# Patient Record
Sex: FEMALE | Race: BLACK OR AFRICAN AMERICAN | Employment: OTHER | URBAN - METROPOLITAN AREA
[De-identification: names, ages, dates, MRNs, and addresses within clinical notes are randomized per-mention and may not be internally consistent; named-entity substitution may affect disease eponyms.]

---

## 2017-01-25 ENCOUNTER — HOSPITAL ENCOUNTER (OUTPATIENT)
Dept: PHYSICAL THERAPY | Age: 57
Discharge: HOME | End: 2017-01-31
Attending: FAMILY MEDICINE | Admitting: FAMILY MEDICINE

## 2017-01-25 ASSESSMENT — PAIN DESCRIPTION - PAIN TYPE: TYPE: CHRONIC PAIN

## 2017-01-25 ASSESSMENT — PAIN DESCRIPTION - FREQUENCY: FREQUENCY: CONTINUOUS

## 2017-01-25 ASSESSMENT — PAIN SCALES - GENERAL: PAINLEVEL_OUTOF10: 8

## 2017-01-25 ASSESSMENT — PAIN DESCRIPTION - ONSET: ONSET: GRADUAL

## 2017-01-25 ASSESSMENT — PAIN DESCRIPTION - PROGRESSION: CLINICAL_PROGRESSION: GRADUALLY WORSENING

## 2017-01-25 ASSESSMENT — PAIN DESCRIPTION - DESCRIPTORS: DESCRIPTORS: ACHING;CONSTANT;SHARP

## 2017-02-01 ENCOUNTER — HOSPITAL ENCOUNTER (OUTPATIENT)
Dept: OTHER | Age: 57
Discharge: HOME | End: 2017-02-28
Attending: FAMILY MEDICINE | Admitting: FAMILY MEDICINE

## 2017-05-24 ENCOUNTER — HOSPITAL ENCOUNTER (OUTPATIENT)
Dept: GENERAL RADIOLOGY | Age: 57
Discharge: HOME | End: 2017-05-24
Attending: INTERNAL MEDICINE | Admitting: INTERNAL MEDICINE

## 2017-05-24 LAB
ALBUMIN SERPL-MCNC: 4.2 GM/DL (ref 3.4–5)
ALP BLD-CCNC: 76 IU/L (ref 40–128)
ALT SERPL-CCNC: 55 U/L (ref 10–40)
ANION GAP SERPL CALCULATED.3IONS-SCNC: 13 MMOL/L (ref 4–16)
AST SERPL-CCNC: 51 IU/L (ref 15–37)
BILIRUB SERPL-MCNC: 0.2 MG/DL (ref 0–1)
BUN BLDV-MCNC: 9 MG/DL (ref 6–23)
CALCIUM SERPL-MCNC: 9.5 MG/DL (ref 8.3–10.6)
CHLORIDE BLD-SCNC: 102 MMOL/L (ref 99–110)
CHOLESTEROL: 128 MG/DL
CO2: 29 MMOL/L (ref 21–32)
CREAT SERPL-MCNC: 0.7 MG/DL (ref 0.6–1.1)
CREATININE URINE: 208.9 MG/DL (ref 28–217)
ESTIMATED AVERAGE GLUCOSE: 171 MG/DL
GFR AFRICAN AMERICAN: >60 ML/MIN/1.73M2
GFR NON-AFRICAN AMERICAN: >60 ML/MIN/1.73M2
GLUCOSE BLD-MCNC: 156 MG/DL (ref 70–140)
HBA1C MFR BLD: 7.6 % (ref 4.2–6.3)
HDLC SERPL-MCNC: 41 MG/DL
LDL CHOLESTEROL DIRECT: 70 MG/DL
MICROALBUMIN/CREAT 24H UR: 21.5 MG/DL
MICROALBUMIN/CREAT UR-RTO: 102.9 MG/G CREAT (ref 0–30)
POTASSIUM SERPL-SCNC: 4.1 MMOL/L (ref 3.5–5.1)
SODIUM BLD-SCNC: 144 MMOL/L (ref 135–145)
TOTAL PROTEIN: 7.3 GM/DL (ref 6.4–8.2)
TRIGL SERPL-MCNC: 96 MG/DL

## 2017-07-03 ENCOUNTER — HOSPITAL ENCOUNTER (OUTPATIENT)
Dept: CT IMAGING | Age: 57
Discharge: HOME | End: 2017-07-03
Attending: INTERNAL MEDICINE | Admitting: INTERNAL MEDICINE

## 2017-07-03 DIAGNOSIS — L04.9 LYMPHADENITIS, ACUTE: ICD-10-CM

## 2017-07-03 DIAGNOSIS — L04.9 ACUTE LYMPHADENITIS: ICD-10-CM

## 2017-09-14 ENCOUNTER — HOSPITAL ENCOUNTER (OUTPATIENT)
Dept: GENERAL RADIOLOGY | Age: 57
Discharge: HOME | End: 2017-09-14
Attending: INTERNAL MEDICINE | Admitting: INTERNAL MEDICINE

## 2017-09-14 LAB
ALBUMIN SERPL-MCNC: 3.9 GM/DL (ref 3.4–5)
ALP BLD-CCNC: 75 IU/L (ref 40–128)
ALT SERPL-CCNC: 64 U/L (ref 10–40)
ANION GAP SERPL CALCULATED.3IONS-SCNC: 15 MMOL/L (ref 4–16)
AST SERPL-CCNC: 44 IU/L (ref 15–37)
BILIRUB SERPL-MCNC: 0.3 MG/DL (ref 0–1)
BUN BLDV-MCNC: 10 MG/DL (ref 6–23)
CALCIUM SERPL-MCNC: 9.5 MG/DL (ref 8.3–10.6)
CHLORIDE BLD-SCNC: 101 MMOL/L (ref 99–110)
CHOLESTEROL: 192 MG/DL
CO2: 28 MMOL/L (ref 21–32)
CREAT SERPL-MCNC: 0.7 MG/DL (ref 0.6–1.1)
ESTIMATED AVERAGE GLUCOSE: 169 MG/DL
GFR AFRICAN AMERICAN: >60 ML/MIN/1.73M2
GFR NON-AFRICAN AMERICAN: >60 ML/MIN/1.73M2
GLUCOSE FASTING: 153 MG/DL (ref 70–99)
HBA1C MFR BLD: 7.5 % (ref 4.2–6.3)
HDLC SERPL-MCNC: 47 MG/DL
LDL CHOLESTEROL DIRECT: 127 MG/DL
POTASSIUM SERPL-SCNC: 4.3 MMOL/L (ref 3.5–5.1)
SODIUM BLD-SCNC: 144 MMOL/L (ref 135–145)
TOTAL PROTEIN: 7.1 GM/DL (ref 6.4–8.2)
TRIGL SERPL-MCNC: 120 MG/DL

## 2017-10-30 ENCOUNTER — TELEPHONE (OUTPATIENT)
Dept: CARDIOLOGY CLINIC | Age: 57
End: 2017-10-30

## 2017-10-30 NOTE — TELEPHONE ENCOUNTER
Called pt to schedule consult per Dr Johana Lesches for leg edema, left message to call office to schedule with Dr Leona Ordoñez this week if poss

## 2017-12-13 ENCOUNTER — HOSPITAL ENCOUNTER (OUTPATIENT)
Dept: GENERAL RADIOLOGY | Age: 57
Discharge: HOME | End: 2017-12-13
Attending: INTERNAL MEDICINE | Admitting: INTERNAL MEDICINE

## 2017-12-13 LAB
ALBUMIN SERPL-MCNC: 4 GM/DL (ref 3.4–5)
ALP BLD-CCNC: 75 IU/L (ref 40–128)
ALT SERPL-CCNC: 82 U/L (ref 10–40)
ANION GAP SERPL CALCULATED.3IONS-SCNC: 13 MMOL/L (ref 4–16)
AST SERPL-CCNC: 101 IU/L (ref 15–37)
BILIRUB SERPL-MCNC: 0.2 MG/DL (ref 0–1)
BUN BLDV-MCNC: 7 MG/DL (ref 6–23)
CALCIUM SERPL-MCNC: 9.9 MG/DL (ref 8.3–10.6)
CHLORIDE BLD-SCNC: 101 MMOL/L (ref 99–110)
CHOLESTEROL: 129 MG/DL
CO2: 30 MMOL/L (ref 21–32)
CREAT SERPL-MCNC: 0.7 MG/DL (ref 0.6–1.1)
ESTIMATED AVERAGE GLUCOSE: 177 MG/DL
GFR AFRICAN AMERICAN: >60 ML/MIN/1.73M2
GFR NON-AFRICAN AMERICAN: >60 ML/MIN/1.73M2
GLUCOSE FASTING: 169 MG/DL (ref 70–99)
HBA1C MFR BLD: 7.8 % (ref 4.2–6.3)
HDLC SERPL-MCNC: 42 MG/DL
LDL CHOLESTEROL DIRECT: 73 MG/DL
POTASSIUM SERPL-SCNC: 4.6 MMOL/L (ref 3.5–5.1)
SODIUM BLD-SCNC: 144 MMOL/L (ref 135–145)
TOTAL PROTEIN: 7.3 GM/DL (ref 6.4–8.2)
TRIGL SERPL-MCNC: 120 MG/DL

## 2018-02-12 ENCOUNTER — INITIAL CONSULT (OUTPATIENT)
Dept: CARDIOLOGY CLINIC | Age: 58
End: 2018-02-12

## 2018-02-12 VITALS
DIASTOLIC BLOOD PRESSURE: 92 MMHG | WEIGHT: 293 LBS | HEIGHT: 68 IN | BODY MASS INDEX: 44.41 KG/M2 | SYSTOLIC BLOOD PRESSURE: 144 MMHG | HEART RATE: 81 BPM

## 2018-02-12 DIAGNOSIS — I10 ESSENTIAL HYPERTENSION: ICD-10-CM

## 2018-02-12 DIAGNOSIS — E11.9 TYPE 2 DIABETES MELLITUS WITHOUT COMPLICATION, WITHOUT LONG-TERM CURRENT USE OF INSULIN (HCC): ICD-10-CM

## 2018-02-12 DIAGNOSIS — R07.9 CHEST PAIN, UNSPECIFIED TYPE: Primary | ICD-10-CM

## 2018-02-12 DIAGNOSIS — R07.2 PRECORDIAL PAIN: ICD-10-CM

## 2018-02-12 PROCEDURE — 93000 ELECTROCARDIOGRAM COMPLETE: CPT | Performed by: INTERNAL MEDICINE

## 2018-02-12 PROCEDURE — 99204 OFFICE O/P NEW MOD 45 MIN: CPT | Performed by: INTERNAL MEDICINE

## 2018-02-12 RX ORDER — PANTOPRAZOLE SODIUM 40 MG/1
40 TABLET, DELAYED RELEASE ORAL DAILY
COMMUNITY

## 2018-02-12 RX ORDER — LISINOPRIL 10 MG/1
10 TABLET ORAL DAILY
Qty: 30 TABLET | Refills: 3 | Status: SHIPPED | OUTPATIENT
Start: 2018-02-12 | End: 2018-03-14 | Stop reason: ALTCHOICE

## 2018-02-12 RX ORDER — FUROSEMIDE 20 MG/1
TABLET ORAL
Refills: 0 | COMMUNITY
Start: 2018-01-16

## 2018-02-12 NOTE — PROGRESS NOTES
CARDIOLOGY CONSULT NOTE    Chief Complaint: Chest Pain/ shortness of breath     HPI:   Gab aZmbrano is a 62y.o. year old who has history as noted below. She has long-standing history of jaw pain. She believes due to arthritis . 6 mo mo nths, back she started noticing chest pressure and pain in the center of her chest.  She notices ankle swelling if she is not taking Lasix. She denies any change in her breathing pattern but she was diaphoretic. Few nights ago. She is diabetic and she has had high blood pressure since age of 25      Current Outpatient Prescriptions   Medication Sig Dispense Refill    pantoprazole (PROTONIX) 40 MG tablet Take 40 mg by mouth daily      furosemide (LASIX) 20 MG tablet take 1/2 tablet by mouth once daily  0    LYRICA 75 MG capsule take 1 capsule by mouth twice a day  0    lisinopril (PRINIVIL;ZESTRIL) 10 MG tablet Take 1 tablet by mouth daily 30 tablet 3    ibuprofen (ADVIL;MOTRIN) 600 MG tablet Take 1 tablet by mouth every 8 hours as needed for Pain 30 tablet 0    indomethacin (INDOCIN) 50 MG capsule Take 1 capsule by mouth 3 times daily as needed (pain) 30 capsule 0    traMADol (ULTRAM) 50 MG tablet Take 1 tablet by mouth every 6 hours as needed for Pain 12 tablet 0    allopurinol (ZYLOPRIM) 100 MG tablet Take 1 tablet by mouth daily. 15 tablet 0    Misc. Devices (CANE) MISC 1 Device by Does not apply route daily as needed. 1 each 0    furosemide (LASIX) 40 MG tablet Take 40 mg by mouth 2 times daily.  DULoxetine (CYMBALTA) 60 MG capsule Take 60 mg by mouth daily.  theophylline CR, 24 hour, (UNIPHYL) 400 MG SR tablet Take 300 mg by mouth daily.  metformin (GLUCOPHAGE) 500 MG tablet Take 500 mg by mouth 2 times daily (with meals).  potassium chloride SA (K-DUR;KLOR-CON M) 10 MEQ tablet Take 10 mEq by mouth daily.  simvastatin (ZOCOR) 20 MG tablet Take 20 mg by mouth nightly.       amLODIPine (NORVASC) 10 MG tablet HDL 42 12/13/2017    LDLCALC 68 12/09/2016    LDLDIRECT 73 12/13/2017     Lab Results   Component Value Date    ALT 82 (H) 12/13/2017     (H) 12/13/2017     TSH: No results found for: TSH      All labs, medications and tests reviewed by myself including data and history from outside source , patient and available family . Assessment & Plan:      1. Chest pain, unspecified type    2. Essential hypertension    3. Precordial pain    4. Type 2 diabetes mellitus without complication, without long-term current use of insulin (HCC)         Precordial pain  Chest pressure / pain occurs with stress , she feels it is ongoing for last 6 months, The pain does not  radiate , she does have  shorntess of breath which gets better after she takes water pill. We will stress test and echo. She gets ankle swelling. I wonder if she has diastolic dysfunction. We will get an echo  Essential hypertension  \"bp always runs high\" add lisinopril 10 mg      Dyslipidemia :  Carol Scott had lab work recently,  Lipid profile was reviewed with patient. Ideally she should be on statins due to diabetes    Counseled extensively and medication compliance urged. We discussed that for the  prevention of ASCVD our  goal is aggressive risk modification. Patient is encouraged to exercise even a brisk walk for 30 minutes  at least 3 to 4 times a week   Various goals were discussed and questions answered. Continue current medications. Appropriate prescriptions are addressed and refills ordered. Questions answered and patient verbalizes understanding. Call for any problems, questions, or concerns. Continue all other medications of all above medical condition listed as is. Return in about 1 month (around 3/12/2018).     Please note this report has been partially produced using speech recognition software and may contain errors related to that system including errors in grammar, punctuation, and spelling, as well as words and phrases that may be

## 2018-02-14 ENCOUNTER — TELEPHONE (OUTPATIENT)
Dept: CARDIOLOGY CLINIC | Age: 58
End: 2018-02-14

## 2018-02-14 NOTE — TELEPHONE ENCOUNTER
Pt called stating she was seen for consult on Monday and thought she was going to have 2 medications prescribed for her. Pt states she only had one for Lisinopril at the pharmacy. Pt is wanting to know if she was supposed to have a second one ordered to protect her kidneys since she is diabetic. Advised pt that I would speak Dr. Jerri Santo and call her to advise afterward. Pt voiced understanding.

## 2018-02-22 ENCOUNTER — PROCEDURE VISIT (OUTPATIENT)
Dept: CARDIOLOGY CLINIC | Age: 58
End: 2018-02-22

## 2018-02-22 DIAGNOSIS — R07.9 CHEST PAIN, UNSPECIFIED TYPE: Primary | ICD-10-CM

## 2018-02-22 DIAGNOSIS — R07.2 PRECORDIAL PAIN: ICD-10-CM

## 2018-02-22 DIAGNOSIS — I10 ESSENTIAL HYPERTENSION: ICD-10-CM

## 2018-02-22 DIAGNOSIS — E11.9 TYPE 2 DIABETES MELLITUS WITHOUT COMPLICATION, WITHOUT LONG-TERM CURRENT USE OF INSULIN (HCC): ICD-10-CM

## 2018-02-22 DIAGNOSIS — R07.9 CHEST PAIN, UNSPECIFIED TYPE: ICD-10-CM

## 2018-02-22 LAB
LV EF: 45 %
LV EF: 55 %
LVEF MODALITY: NORMAL
LVEF MODALITY: NORMAL

## 2018-02-22 PROCEDURE — 93016 CV STRESS TEST SUPVJ ONLY: CPT | Performed by: INTERNAL MEDICINE

## 2018-02-22 PROCEDURE — 78452 HT MUSCLE IMAGE SPECT MULT: CPT | Performed by: INTERNAL MEDICINE

## 2018-02-22 PROCEDURE — 93306 TTE W/DOPPLER COMPLETE: CPT | Performed by: INTERNAL MEDICINE

## 2018-02-22 PROCEDURE — 93018 CV STRESS TEST I&R ONLY: CPT | Performed by: INTERNAL MEDICINE

## 2018-02-22 PROCEDURE — 93017 CV STRESS TEST TRACING ONLY: CPT | Performed by: INTERNAL MEDICINE

## 2018-02-22 PROCEDURE — A9500 TC99M SESTAMIBI: HCPCS | Performed by: INTERNAL MEDICINE

## 2018-02-23 ENCOUNTER — TELEPHONE (OUTPATIENT)
Dept: CARDIOLOGY CLINIC | Age: 58
End: 2018-02-23

## 2018-03-14 ENCOUNTER — OFFICE VISIT (OUTPATIENT)
Dept: CARDIOLOGY CLINIC | Age: 58
End: 2018-03-14

## 2018-03-14 VITALS
WEIGHT: 293 LBS | SYSTOLIC BLOOD PRESSURE: 144 MMHG | HEART RATE: 78 BPM | DIASTOLIC BLOOD PRESSURE: 88 MMHG | BODY MASS INDEX: 46.83 KG/M2

## 2018-03-14 DIAGNOSIS — E11.9 TYPE 2 DIABETES MELLITUS WITHOUT COMPLICATION, WITHOUT LONG-TERM CURRENT USE OF INSULIN (HCC): ICD-10-CM

## 2018-03-14 DIAGNOSIS — R07.2 PRECORDIAL PAIN: Primary | ICD-10-CM

## 2018-03-14 DIAGNOSIS — I10 ESSENTIAL HYPERTENSION: ICD-10-CM

## 2018-03-14 DIAGNOSIS — I73.9 CLAUDICATION (HCC): ICD-10-CM

## 2018-03-14 PROCEDURE — 99214 OFFICE O/P EST MOD 30 MIN: CPT | Performed by: INTERNAL MEDICINE

## 2018-03-14 RX ORDER — BIMATOPROST 0.01 %
1 DROPS OPHTHALMIC (EYE) DAILY
Refills: 1 | COMMUNITY
Start: 2018-01-22

## 2018-03-14 RX ORDER — AMLODIPINE BESYLATE AND BENAZEPRIL HYDROCHLORIDE 10; 20 MG/1; MG/1
1 CAPSULE ORAL DAILY
Refills: 0 | COMMUNITY
Start: 2018-02-15 | End: 2018-03-14 | Stop reason: ALTCHOICE

## 2018-03-14 RX ORDER — AMLODIPINE BESYLATE AND BENAZEPRIL HYDROCHLORIDE 10; 40 MG/1; MG/1
1 CAPSULE ORAL DAILY
Qty: 30 CAPSULE | Refills: 3 | Status: SHIPPED | OUTPATIENT
Start: 2018-03-14 | End: 2018-03-19 | Stop reason: ALTCHOICE

## 2018-03-14 RX ORDER — PRAVASTATIN SODIUM 20 MG
20 TABLET ORAL DAILY
Qty: 30 TABLET | Refills: 3 | Status: SHIPPED | OUTPATIENT
Start: 2018-03-14 | End: 2018-07-02 | Stop reason: SDUPTHER

## 2018-03-14 RX ORDER — THEOPHYLLINE 300 MG/1
1 TABLET, EXTENDED RELEASE ORAL DAILY
Refills: 0 | COMMUNITY
Start: 2018-02-11 | End: 2022-03-07 | Stop reason: ALTCHOICE

## 2018-03-14 RX ORDER — HYDROCODONE BITARTRATE AND ACETAMINOPHEN 5; 325 MG/1; MG/1
1 TABLET ORAL 3 TIMES DAILY PRN
Refills: 0 | COMMUNITY
Start: 2018-02-15 | End: 2021-09-22

## 2018-03-14 NOTE — PROGRESS NOTES
concerned rich she is taking amlodipine alone and the in combination pill as well     Claudication [facility])  Check arterial doppler , it may be neuropathy     Dyslipidemia :  Etta Saucedo had lab work recently,  Lipid profile was reviewed with patient. Ideally she should be on statins due to diabetes. Will start small dose pravastatin     Counseled extensively and medication compliance urged. We discussed that for the  prevention of ASCVD our  goal is aggressive risk modification. Patient is encouraged to exercise even a brisk walk for 30 minutes  at least 3 to 4 times a week   Various goals were discussed and questions answered. Continue current medications. Appropriate prescriptions are addressed and refills ordered. Questions answered and patient verbalizes understanding. Call for any problems, questions, or concerns. Continue all other medications of all above medical condition listed as is. No Follow-up on file. Please note this report has been partially produced using speech recognition software and may contain errors related to that system including errors in grammar, punctuation, and spelling, as well as words and phrases that may be inappropriate.  If there are any questions or concerns please feel free to contact the dictating provider for clarification.

## 2018-03-19 RX ORDER — DICYCLOMINE HCL 20 MG
20 TABLET ORAL EVERY 6 HOURS
COMMUNITY

## 2018-03-19 RX ORDER — AMLODIPINE BESYLATE AND ATORVASTATIN CALCIUM 10; 40 MG/1; MG/1
1 TABLET, FILM COATED ORAL DAILY
COMMUNITY
End: 2020-04-14 | Stop reason: ALTCHOICE

## 2018-03-26 ENCOUNTER — PROCEDURE VISIT (OUTPATIENT)
Dept: CARDIOLOGY CLINIC | Age: 58
End: 2018-03-26

## 2018-03-26 DIAGNOSIS — E11.9 TYPE 2 DIABETES MELLITUS WITHOUT COMPLICATION, WITHOUT LONG-TERM CURRENT USE OF INSULIN (HCC): ICD-10-CM

## 2018-03-26 DIAGNOSIS — I10 ESSENTIAL HYPERTENSION: ICD-10-CM

## 2018-03-26 DIAGNOSIS — R07.2 PRECORDIAL PAIN: ICD-10-CM

## 2018-03-26 DIAGNOSIS — I73.9 CLAUDICATION (HCC): Primary | ICD-10-CM

## 2018-03-26 PROCEDURE — 93922 UPR/L XTREMITY ART 2 LEVELS: CPT | Performed by: INTERNAL MEDICINE

## 2018-03-26 PROCEDURE — 93925 LOWER EXTREMITY STUDY: CPT | Performed by: INTERNAL MEDICINE

## 2018-04-02 ENCOUNTER — TELEPHONE (OUTPATIENT)
Dept: CARDIOLOGY CLINIC | Age: 58
End: 2018-04-02

## 2018-07-02 RX ORDER — AMLODIPINE BESYLATE AND BENAZEPRIL HYDROCHLORIDE 10; 40 MG/1; MG/1
CAPSULE ORAL
Qty: 30 CAPSULE | Refills: 5 | Status: SHIPPED | OUTPATIENT
Start: 2018-07-02 | End: 2019-01-22 | Stop reason: SDUPTHER

## 2018-07-02 RX ORDER — PRAVASTATIN SODIUM 20 MG
TABLET ORAL
Qty: 30 TABLET | Refills: 5 | Status: SHIPPED | OUTPATIENT
Start: 2018-07-02 | End: 2019-02-14 | Stop reason: SDUPTHER

## 2018-07-02 NOTE — TELEPHONE ENCOUNTER
Left a message for the patient to call the office back to reschedule her 6 mon follow up in September with Nai.

## 2018-07-17 ENCOUNTER — HOSPITAL ENCOUNTER (OUTPATIENT)
Dept: WOUND CARE | Age: 58
Discharge: HOME | End: 2018-07-17
Attending: NURSE PRACTITIONER | Admitting: NURSE PRACTITIONER

## 2018-07-17 VITALS
SYSTOLIC BLOOD PRESSURE: 144 MMHG | RESPIRATION RATE: 16 BRPM | DIASTOLIC BLOOD PRESSURE: 106 MMHG | HEART RATE: 72 BPM | WEIGHT: 293 LBS | HEIGHT: 68 IN | BODY MASS INDEX: 44.41 KG/M2 | TEMPERATURE: 97 F

## 2018-07-17 DIAGNOSIS — E11.9 TYPE 2 DIABETES MELLITUS WITHOUT COMPLICATION, WITHOUT LONG-TERM CURRENT USE OF INSULIN (HCC): Primary | ICD-10-CM

## 2018-07-17 DIAGNOSIS — S81.802D WOUND OF LEFT LEG, SUBSEQUENT ENCOUNTER: ICD-10-CM

## 2018-07-17 PROCEDURE — 11042 DBRDMT SUBQ TIS 1ST 20SQCM/<: CPT | Performed by: NURSE PRACTITIONER

## 2018-07-17 PROCEDURE — 99213 OFFICE O/P EST LOW 20 MIN: CPT | Performed by: NURSE PRACTITIONER

## 2018-07-17 RX ORDER — LIDOCAINE HYDROCHLORIDE 40 MG/ML
SOLUTION TOPICAL ONCE
Status: DISCONTINUED | OUTPATIENT
Start: 2018-07-17 | End: 2018-07-18 | Stop reason: HOSPADM

## 2018-07-17 NOTE — PROGRESS NOTES
[facility] Initial Visit      Peter Irizarry  AGE: 62 y.o. GENDER: female  : 1960  EPISODE DATE:  2018     Subjective:     CHIEF COMPLAINT wound to left leg     HISTORY of PRESENT ILLNESS      Peter Irizarry is a 62 y.o. female who presents to the [address] [place] [address] for an initial visit for evaluation and treatment of Acute traumatic  wound(s) of  Left lower leg. The condition is of mild severity. The wound has been present for 1 weeks. The underlying cause is thought to be trauma after she dropped a pack and play on her leg. The patients care to date has included antibiotic and band-aid. The patient has significant underlying medical conditions as below. Wound Pain Timing/Severity: intermittent  Quality of pain: aching  Severity of pain:  2 / 10   Modifying Factors: diabetes  Associated Signs/Symptoms: none        PAST MEDICAL HISTORY        Diagnosis Date    Arthritis     Asthma     Bipolar 1 disorder (HCC)     COPD (chronic obstructive pulmonary disease) ([facility])     Depression     Diabetes mellitus ([facility])     Edema     Glaucoma     History of Doppler ultrasound 2018    Arterial-bilateral VERONICA's show normal arterial flow. No signif occlusive arterial disease.     History of nuclear stress test 2018    cardiolite-EF45%,normal    Hx of Doppler echocardiogram 2018    EF50-60%,no valvular disease    Hyperlipidemia     Hypertension     IBS (irritable bowel syndrome)        PAST SURGICAL HISTORY    Past Surgical History:   Procedure Laterality Date    TUBAL LIGATION         FAMILY HISTORY    Family History   Problem Relation Age of Onset   [facility] Cancer Mother     Diabetes Mother     Heart Disease Mother     Cancer Father     High Blood Pressure Father     Cancer Other     Cancer Other     Diabetes Sister     Heart Disease Sister     High Blood Pressure Brother     Depression Sister        SOCIAL HISTORY    Social History   Substance Use Topics    7/17/2018 10:05 AM   Andreea-wound Assessment Red 7/17/2018 10:05 AM   Non-staged Wound Description Full thickness 7/17/2018 10:05 AM   [place]%Wound Bed 0 7/17/2018 10:05 AM   Red%Wound Bed 100 7/17/2018 10:05 AM   Yellow%Wound Bed 0 7/17/2018 10:05 AM   Black%Wound Bed 0 7/17/2018 10:05 AM   Purple%Wound Bed 0 7/17/2018 10:05 AM   Other%Wound Bed 0 7/17/2018 10:05 AM   Culture Taken Yes 7/17/2018 10:05 AM   Number of days: 0       Percent of Wound(s) Debrided: 100%    Total  Area  Debrided:  0.09 sq cm     Bleeding:  Minimal    Hemostasis Achieved:  by pressure    Procedural Pain:  0  / 10     Post Procedural Pain:  0 / 10     Response to treatment:  Well tolerated by patient. Plan:     Discharge instructions:   PHYSICIAN ORDERS AND DISCHARGE INSTRUCTIONS    NOTE: Upon discharge from the [address], you will receive a patient experience survey. We would be grateful if you would take the time to fill this survey out.     Wound care order history:     VERONICA's:    Vascular studies:  ARTERIAL STUDIES DONE ON 3/28/18        Imaging:                                                                              Date    Cultures: 7/17/18 OF LEFT LOWER ANTERIOR LEG    Labs/ HbA1c:                                                                      Date    Grafts:                                                                                 Date    HBO:     Antibiotics:               Earlier Wound care treatments:                Authorizations:      Consults:                                                                                Date     Primary care physician:     Continuing wound care orders and information:              Residence:                Continue home health care with:    Your wound-care supplies will be provided by:    DME provider:   Compression with   Off loading:                                                                                         Date    Wound Medications:    Wound cleansing:

## 2018-07-17 NOTE — PLAN OF CARE
Problem: Wound:  Intervention: Assess pain status  DENIES  Intervention: Assess wound size, appearance and drainage  SEE FLOWSHEET  Intervention: Assess pedal pulses bilaterally if patient has a foot or leg ulcer  SEE FLOWSHEET    Goal: Will show signs of wound healing; wound closure and no evidence of infection  Will show signs of wound healing; wound closure and no evidence of infection  Outcome: Ongoing

## 2018-07-21 LAB
CULTURE: NORMAL
CULTURE: NORMAL
REPORT STATUS: NORMAL
REQUEST PROBLEM: NORMAL
SPECIMEN: NORMAL

## 2018-07-26 ENCOUNTER — HOSPITAL ENCOUNTER (OUTPATIENT)
Dept: WOUND CARE | Age: 58
Discharge: HOME | End: 2018-07-26
Attending: NURSE PRACTITIONER | Admitting: NURSE PRACTITIONER

## 2018-07-26 VITALS
DIASTOLIC BLOOD PRESSURE: 82 MMHG | SYSTOLIC BLOOD PRESSURE: 144 MMHG | HEART RATE: 68 BPM | RESPIRATION RATE: 16 BRPM | TEMPERATURE: 97.5 F

## 2018-07-26 DIAGNOSIS — S81.802D WOUND OF LEFT LEG, SUBSEQUENT ENCOUNTER: Primary | ICD-10-CM

## 2018-07-26 PROCEDURE — 99212 OFFICE O/P EST SF 10 MIN: CPT | Performed by: NURSE PRACTITIONER

## 2018-07-26 NOTE — PROGRESS NOTES
Wound Care Center Progress Note       Maximino Iraheta  AGE: 62 y.o. GENDER: female  : 1960  TODAY'S DATE:  2018        Subjective:     Chief Complaint   Patient presents with    Wound Check     left leg         HISTORY of PRESENT ILLNESS     Maximino Iraheta is a 62 y.o. female who presents today for wound evaluation of Acute traumatic wound(s) of left lower leg. The wound is of mild severity. The underlying cause of the wound is trauma. Wounds are healed today. Wound Pain Timing/Severity: none  Quality of pain: N/A  Severity of pain:  0 / 10   Modifying Factors: diabetes  Associated Signs/Symptoms: none        PAST MEDICAL HISTORY        Diagnosis Date    Arthritis     Asthma     [facility]     COPD [facility]ronic obstructive [facility] disease) ([facility]     Depression     Diabetes mellitus (HCC)     Edema     Glaucoma     History of Doppler ultrasound 2018    Arterial-bilateral VERONICA's show normal arterial flow. No signif occlusive arterial disease.     History of nuclear stress test 2018    cardiolite-EF45%,normal    Hx of Doppler echocardiogram 2018    EF50-60%,no valvular disease    Hyperlipidemia     Hypertension     IBS (irritable bowel syndrome)        PAST SURGICAL HISTORY    Past Surgical History:   Procedure Laterality Date    TUBAL LIGATION         FAMILY HISTORY    Family History   Problem Relation Age of Onset    Cancer Mother     Diabetes Mother     Heart Disease Mother     Cancer Father     High Blood Pressure Father     Cancer Other     Cancer Other     Diabetes Sister     Heart Disease Sister     High Blood Pressure Brother     Depression Sister        SOCIAL HISTORY    Social History   Substance Use Topics    Smoking status: Former Smoker    Smokeless tobacco: Never Used    Alcohol use No       ALLERGIES    Allergies   Allergen Reactions    Codeine     Adhesive Tape Itching    Toradol [Ketorolac Tromethamine] Other (See Comments)     Headache for 5-6 days       MEDICATIONS    Current Outpatient Prescriptions on File Prior to Encounter   Medication Sig Dispense Refill    pravastatin (PRAVACHOL) 20 MG tablet take 1 tablet by mouth once daily 30 tablet 5    amLODIPine-benazepril (LOTREL) 10-40 MG per capsule take 1 capsule by mouth once daily 30 capsule 5    amLODIPine-atorvastatatin (CADUET) 10-40 MG per tablet Take 1 tablet by mouth daily      dicyclomine (BENTYL) 20 MG tablet Take 20 mg by mouth every 6 hours      VENTOLIN  (90 Base) MCG/ACT inhaler   0    LUMIGAN 0.01 % SOLN ophthalmic drops   1    HYDROcodone-acetaminophen (NORCO) 5-325 MG per tablet 1 tablet 3 times daily as needed. 0    theophylline (THEODUR) 300 MG extended release tablet 1 tablet daily  0    pantoprazole (PROTONIX) 40 MG tablet Take 40 mg by mouth daily      furosemide (LASIX) 20 MG tablet take 1/2 tablet every few weeks  0    LYRICA 75 MG capsule take 1 capsule by mouth twice a day  0    ibuprofen (ADVIL;MOTRIN) 600 MG tablet Take 1 tablet by mouth every 8 hours as needed for Pain 30 tablet 0    indomethacin (INDOCIN) 50 MG capsule Take 1 capsule by mouth 3 times daily as needed (pain) 30 capsule 0    Misc. Devices (CANE) MISC 1 Device by Does not apply route daily as needed. 1 each 0    DULoxetine (CYMBALTA) 60 MG capsule Take 60 mg by mouth daily.  metformin (GLUCOPHAGE) 500 MG tablet Take 500 mg by mouth 2 times daily (with meals).  potassium chloride SA (K-DUR;KLOR-CON M) 10 MEQ tablet Take 10 mEq by mouth daily.  insulin glargine (LANTUS) 100 UNIT/ML injection Inject  into the skin nightly.  insulin lispro (HUMALOG) 100 UNIT/ML injection Inject  into the skin 3 times daily (before meals). No current facility-administered medications on file prior to encounter. REVIEW OF SYSTEMS    Pertinent items are noted in HPI.     Constitutional: Negative for systemic symptoms including fever, chills and Visit     WD-Wound of left leg, subsequent encounter - Primary          Status of wound progress and description from last visit:   Healed, discharge from wound clinic. Plan:     Discharge Instructions        PHYSICIAN ORDERS AND DISCHARGE INSTRUCTIONS     NOTE: Upon discharge from the [address], you will receive a patient experience survey. We would be grateful if you would take the time to fill this survey out.     Wound care order history:                 VERONICA's:               Vascular studies:  ARTERIAL STUDIES DONE ON 3/28/18                   Imaging:                                                                              Date               Cultures: 7/17/18 OF LEFT LOWER ANTERIOR LEG               Labs/ HbA1c:                                                                      Date               Grafts:                                                                                 Date               HBO:                Antibiotics:               Earlier Wound care treatments:                Authorizations:                        Consults:                                                                                Date                           Primary care physician:      Continuing wound care orders and information:              Residence:                Continue home health care with:               Your wound-care supplies will be provided by:               DME provider:              Compression with              Off loading:                                                                                         Date               Wound Medications:              Wound cleansing:                           Do not scrub or use excessive force. Wash hands with soap and water before and after dressing changes.                           Prior to applying a clean dressing, cleanse wound with normal saline,                                wound cleanser, or mild soap and

## 2018-07-26 NOTE — PLAN OF CARE
Problem: Wound:  Intervention: Assess pain status  denies  Intervention: Assess wound size, appearance and drainage  healed    Goal: Will show signs of wound healing; wound closure and no evidence of infection  Will show signs of wound healing; wound closure and no evidence of infection   healed

## 2018-07-26 NOTE — PLAN OF CARE
Problem: Wound:  Intervention: Assess pain status  See flow sheet  Intervention: Assess wound size, appearance and drainage  See flow sheet  Intervention: Assess pedal pulses bilaterally if patient has a foot or leg ulcer  See flow sheet    Goal: Will show signs of wound healing; wound closure and no evidence of infection  Will show signs of wound healing; wound closure and no evidence of infection   Outcome: Met This Shift

## 2018-07-31 ENCOUNTER — HOSPITAL ENCOUNTER (OUTPATIENT)
Dept: SLEEP CENTER | Age: 58
Discharge: HOME | End: 2018-07-31

## 2018-07-31 VITALS
DIASTOLIC BLOOD PRESSURE: 96 MMHG | BODY MASS INDEX: 44.41 KG/M2 | WEIGHT: 293 LBS | SYSTOLIC BLOOD PRESSURE: 161 MMHG | HEIGHT: 68 IN | OXYGEN SATURATION: 98 % | HEART RATE: 71 BPM

## 2018-07-31 DIAGNOSIS — G47.33 OSA (OBSTRUCTIVE SLEEP APNEA): Primary | ICD-10-CM

## 2018-07-31 ASSESSMENT — SLEEP AND FATIGUE QUESTIONNAIRES
HOW LIKELY ARE YOU TO NOD OFF OR FALL ASLEEP WHILE LYING DOWN TO REST IN THE AFTERNOON WHEN CIRCUMSTANCES PERMIT: 2
HOW LIKELY ARE YOU TO NOD OFF OR FALL ASLEEP WHEN YOU ARE A PASSENGER IN A CAR FOR AN HOUR WITHOUT A BREAK: 1
HOW LIKELY ARE YOU TO NOD OFF OR FALL ASLEEP WHILE WATCHING TV: 2
HOW LIKELY ARE YOU TO NOD OFF OR FALL ASLEEP WHILE SITTING AND TALKING TO SOMEONE: 1
HOW LIKELY ARE YOU TO NOD OFF OR FALL ASLEEP IN A CAR, WHILE STOPPED FOR A FEW MINUTES IN TRAFFIC: 1
HOW LIKELY ARE YOU TO NOD OFF OR FALL ASLEEP WHILE SITTING AND READING: 2
HOW LIKELY ARE YOU TO NOD OFF OR FALL ASLEEP WHILE SITTING INACTIVE IN A PUBLIC PLACE: 1
ESS TOTAL SCORE: 12
HOW LIKELY ARE YOU TO NOD OFF OR FALL ASLEEP WHILE SITTING QUIETLY AFTER LUNCH WITHOUT ALCOHOL: 2

## 2018-07-31 NOTE — CONSULTS
Miguel Gallegos MD, Kristen Mayes MD, Vick Sagastume MD, Jeanette Martinez MD, [facility]      [address] [facility], [address] [place] [facility]   PH: [phone]  F: [phone]     Subjective:     Patient ID: Danielle Olivia is a 62 y.o. female, referred to the sleep center for   Chief Complaint   Patient presents with    Snoring    Fatigue   . 62year old female with loud snoring. She is witnessed to stop breathing. She does not feel fresh when wakes up and feels tired and fatigued all day. She has eds. She has gained about 50 lbs in past 10 years    Referring physician:  Vivienne Wagner     History:    Social History     Social History    Marital status: Legally      Spouse name: N/A    Number of children: N/A    Years of education: N/A     Occupational History    Not on file. Social History Main Topics    Smoking status: Former Smoker    Smokeless tobacco: Never Used    Alcohol use No    Drug use: No    Sexual activity: Not on file     Other Topics Concern    Not on file     Social History Narrative    No narrative on file       Prior to Admission medications    Medication Sig Start Date End Date Taking? Authorizing Provider   pravastatin (PRAVACHOL) 20 MG tablet take 1 tablet by mouth once daily 7/2/18  Yes Jany Long MD   amLODIPine-benazepril (LOTREL) 10-40 MG per capsule take 1 capsule by mouth once daily 7/2/18  Yes Jany Long MD   amLODIPine-atorvastatatin (CADUET) 10-40 MG per tablet Take 1 tablet by mouth daily   Yes Historical Provider, MD   dicyclomine (BENTYL) 20 MG tablet Take 20 mg by mouth every 6 hours   Yes Historical Provider, MD HUSSEIN  (90 Base) MCG/ACT inhaler  2/27/18  Yes Historical Provider, MD BERMEO 0.01 % SOLN ophthalmic drops  1/22/18  Yes Historical Provider, MD   HYDROcodone-acetaminophen (NORCO) 5-325 MG per tablet 1 tablet 3 times daily as needed.  2/15/18  Yes  Bipolar 1 disorder ([facility]     COPD (chronic obstructive pulmonary disease) (HCC)     Depression     Diabetes mellitus (HCC)     Edema     Glaucoma     History of Doppler ultrasound 03/26/2018    Arterial-bilateral VERONICA's show normal arterial flow. No signif occlusive arterial disease.  History of nuclear stress test 02/22/2018    cardiolite-EF45%,normal    Hx of Doppler echocardiogram 02/22/2018    EF50-60%,no valvular disease    Hyperlipidemia     Hypertension     IBS (irritable bowel syndrome)        Past Surgical History:   Procedure Laterality Date    TUBAL LIGATION         Family History   Problem Relation Age of Onset   [facility] Cancer Mother     Diabetes Mother     Heart Disease Mother     Cancer Father     High Blood Pressure Father     Cancer Other     Cancer Other     Diabetes Sister     Heart Disease Sister     High Blood Pressure Brother     Depression Sister          Objective:     Vitals:    07/31/18 1309   BP: (!) 161/96   Pulse: 71   SpO2: 98%   Weight: (!) 323 lb 14.4 oz (146.9 kg)   Height: 5' 8\" (1.727 m)     Neck circumference: 17  Inches  [place] - Total score: 12    Gen: No distress. Eyes: PERRL. No sclera icterus. No conjunctival injection. ENT: No discharge. Pharynx clear. External appearance of ears and nose normal.  Neck: Trachea midline. No obvious mass. Resp: No accessory muscle use. No crackles. No wheezes. No rhonchi. No dullness on percussion. CV: Regular rate. Regular rhythm. No murmur or rub. No edema. GI: Non-tender. Non-distended. No hernia. Skin: Warm, dry, normal texture and turgor. No nodule on exposed extremities. Lymph: No cervical LAD. No supraclavicular LAD. M/S: No cyanosis. No clubbing. No joint deformity. Psych: Oriented x 3. No anxiety. Awake. Alert. Intact judgement and insight.     Mallampati Airway Classification:   []1 [x]2 []3 []4        Sleep Complaints/Symptoms:    Normal Bedtime:      Normal Wake Time:   Average Sleep pressure change   []  Refer for an oral appliance       Medications:       [x]  Continue current medication    []  Add Medication:  ________________    Follow-Up:     []  No follow up required. Patient to return as needed. []  2 weeks   []  4 weeks   []  2 months   []  4 months   []  6 months   []  1 year for CPAP compliance evaluation. Patient to return sooner, as needed. [x]  Follow up after sleep study   []  Other: ______________    No orders of the defined types were placed in this encounter.          Electronically signed by Grisel Degroot MD on 7/31/2018 at 1:23 PM

## 2018-08-08 ENCOUNTER — HOSPITAL ENCOUNTER (OUTPATIENT)
Dept: SLEEP CENTER | Age: 58
Discharge: HOME | End: 2018-08-08
Attending: INTERNAL MEDICINE | Admitting: INTERNAL MEDICINE

## 2018-08-15 NOTE — PROGRESS NOTES
Results for the most recent sleep study on [facility]  1960 are finalized and available. Please see media tab.     Electronically signed by Gwendolynn Babinski on 8/15/2018 at 4:56 AM

## 2018-09-04 ENCOUNTER — HOSPITAL ENCOUNTER (OUTPATIENT)
Dept: SLEEP CENTER | Age: 58
Discharge: HOME | End: 2018-09-04
Attending: INTERNAL MEDICINE | Admitting: INTERNAL MEDICINE

## 2018-09-11 ENCOUNTER — OFFICE VISIT (OUTPATIENT)
Dept: CARDIOLOGY CLINIC | Age: 58
End: 2018-09-11

## 2018-09-11 VITALS
BODY MASS INDEX: 44.41 KG/M2 | SYSTOLIC BLOOD PRESSURE: 140 MMHG | HEART RATE: 84 BPM | DIASTOLIC BLOOD PRESSURE: 86 MMHG | HEIGHT: 68 IN | WEIGHT: 293 LBS

## 2018-09-11 DIAGNOSIS — E11.9 TYPE 2 DIABETES MELLITUS WITHOUT COMPLICATION, WITHOUT LONG-TERM CURRENT USE OF INSULIN (HCC): ICD-10-CM

## 2018-09-11 DIAGNOSIS — I10 ESSENTIAL HYPERTENSION: Primary | ICD-10-CM

## 2018-09-11 DIAGNOSIS — R07.2 PRECORDIAL PAIN: ICD-10-CM

## 2018-09-11 PROCEDURE — 99214 OFFICE O/P EST MOD 30 MIN: CPT | Performed by: INTERNAL MEDICINE

## 2018-09-11 NOTE — PROGRESS NOTES
CARDIOLOGY  NOTE    Chief Complaint: Chest Pain/ shortness of breath     HPI:   Katie is a 62y.o. year old who has history as noted below. she gest chest pain once in a while . she says her legs are hurting a lot , her back is hurting  some times at rest and sometimes with walking , she walks with a cane   She has long-standing history of jaw pain. She believes due to arthritis . 6 mo mo nths, back she started noticing chest pressure and pain in the center of her chest.  She notices ankle swelling if she is not taking Lasix. She denies any change in her breathing pattern. she got diaphoretic at night few months ago but her sugars were low  She is diabetic and she has had high blood pressure since age of 25      Current Outpatient Prescriptions   Medication Sig Dispense Refill    pravastatin (PRAVACHOL) 20 MG tablet take 1 tablet by mouth once daily 30 tablet 5    amLODIPine-benazepril (LOTREL) 10-40 MG per capsule take 1 capsule by mouth once daily 30 capsule 5    amLODIPine-atorvastatatin (CADUET) 10-40 MG per tablet Take 1 tablet by mouth daily      dicyclomine (BENTYL) 20 MG tablet Take 20 mg by mouth every 6 hours      VENTOLIN  (90 Base) MCG/ACT inhaler   0    LUMIGAN 0.01 % SOLN ophthalmic drops   1    HYDROcodone-acetaminophen (NORCO) 5-325 MG per tablet 1 tablet 3 times daily as needed. 0    theophylline (THEODUR) 300 MG extended release tablet 1 tablet daily  0    pantoprazole (PROTONIX) 40 MG tablet Take 40 mg by mouth daily      furosemide (LASIX) 20 MG tablet take 1/2 tablet every few weeks  0    LYRICA 75 MG capsule take 1 capsule by mouth twice a day  0    ibuprofen (ADVIL;MOTRIN) 600 MG tablet Take 1 tablet by mouth every 8 hours as needed for Pain 30 tablet 0    indomethacin (INDOCIN) 50 MG capsule Take 1 capsule by mouth 3 times daily as needed (pain) 30 capsule 0    Misc.  Devices (CANE) MISC 1 Device by Does not apply route daily as needed. 1 each 0    DULoxetine (CYMBALTA) 60 MG capsule Take 60 mg by mouth daily.  metformin (GLUCOPHAGE) 500 MG tablet Take 500 mg by mouth 2 times daily (with meals).  potassium chloride SA (K-DUR;KLOR-CON M) 10 MEQ tablet Take 10 mEq by mouth daily.  insulin glargine (LANTUS) 100 UNIT/ML injection Inject  into the skin nightly.  insulin lispro (HUMALOG) 100 UNIT/ML injection Inject  into the skin 3 times daily (before meals). No current facility-administered medications for this visit. Allergies:   Codeine; Adhesive tape; and Toradol [ketorolac tromethamine]    Patient History:  Past Medical History:   Diagnosis Date    Arthritis     Asthma     [facility] disorder ([facility] [address]     COPD (chronic obstructive pulmonary disease) ([facility] [address]     Depression     Diabetes mellitus (HCC)     Edema     Glaucoma     History of Doppler ultrasound 03/26/2018    Arterial-bilateral VERONICA's show normal arterial flow. No signif occlusive arterial disease.     History of nuclear stress test 02/22/2018    cardiolite-EF45%,normal    Hx of Doppler echocardiogram 02/22/2018    EF50-60%,no valvular disease    Hyperlipidemia     Hypertension     IBS (irritable bowel syndrome)      Past Surgical History:   Procedure Laterality Date    TUBAL LIGATION       Family History   Problem Relation Age of Onset   Royetta Perches Cancer Mother     Diabetes Mother     Heart Disease Mother     Cancer Father     High Blood Pressure Father     Cancer Other     Cancer Other     Diabetes Sister     Heart Disease Sister     High Blood Pressure Brother     Depression Sister      Social History   Substance Use Topics    Smoking status: Former Smoker    Smokeless tobacco: Never Used    Alcohol use No        Review of Systems:   · Constitutional: No Fever or Weight Loss   · Eyes: No Decreased Vision  · ENT: No Headaches, Hearing Loss or Vertigo  · Cardiovascular: as per note above   · Respiratory: No cough or wheezing and as per note above. · Gastrointestinal: No abdominal pain, appetite loss, blood in stools, constipation, diarrhea or heartburn  · Genitourinary: No dysuria, trouble voiding, or hematuria  · Musculoskeletal:  None  · Integumentary: No rash or pruritis  · Neurological: No TIA or stroke symptoms  · Psychiatric: No anxiety or depression  · Endocrine: No malaise, fatigue or temperature intolerance  · Hematologic/Lymphatic: No bleeding problems, blood clots or swollen lymph nodes  · Allergic/Immunologic: No nasal congestion or hives    Objective:      Physical Exam:  BP (!) 140/86   Pulse 84   Ht 5' 8\" (1.727 m)   Wt (!) 318 lb 6.4 oz (144.4 kg)   BMI 48.41 kg/m²   Wt Readings from Last 3 Encounters:   09/11/18 (!) 318 lb 6.4 oz (144.4 kg)   07/31/18 (!) 323 lb 14.4 oz (146.9 kg)   07/17/18 (!) 320 lb (145.2 kg)     Body mass index is 48.41 kg/m². Vitals:    09/11/18 1605   BP: (!) 140/86   Pulse:         General Appearance:  No distress, conversant  Constitutional:  Well developed, Well nourished, No acute distress, Non-toxic appearance. HENT:  Normocephalic, Atraumatic, Bilateral external ears normal, Oropharynx moist, No oral exudates, Nose normal. Neck- Normal range of motion, No tenderness, Supple, No stridor,no apical-carotid delay  Eyes:  PERRL, EOMI, Conjunctiva normal, No discharge. Respiratory:  Normal breath sounds, No respiratory distress, No wheezing, No chest tenderness. ,no use of accessory muscles,   Cardiovascular: (PMI) apex non displaced,no lifts no thrills,S1 and S2 audible, No added heart sounds, No signs of ankle edema, or volume overload, No evidence of JVD  GI:  Bowel sounds normal, Soft, No tenderness, No masses, No gross visceromegaly   :  No costovertebral angle tenderness   Musculoskeletal:  No edema, no tenderness, no deformities.  Back- no tenderness  Integument:  Well hydrated, no rash   Lymphatic:  No lymphadenopathy noted   Neurologic:  Alert & oriented x 3, CN 2-12 normal, normal motor function, normal sensory function, no focal deficits noted   Psychiatric:  Speech and behavior appropriate       Medical decision making and Data review:  DATA:  No results found for: TROPONINT  BNP:  No results found for: PROBNP  PT/INR:  No results found for: PTINR  Lab Results   Component Value Date    LABA1C 7.8 (H) 12/13/2017    LABA1C 7.5 (H) 09/14/2017     Lab Results   Component Value Date    CHOL 129 12/13/2017    TRIG 120 12/13/2017    HDL 42 12/13/2017    LDLCALC 68 12/09/2016    LDLDIRECT 73 12/13/2017     Lab Results   Component Value Date    ALT 42 (H) 04/24/2018    AST 42 (H) 04/24/2018     TSH: No results found for: TSH  Stress test 2/22/18  Summary    Supervising physician Dr. Clarissa Ann portion of stress test is negative for ischemia by diagnostic criteria.    Normal EF 45 % with normal ventricular contractility.    No infarct or ischemia noted.    Normal stress myocardial perfusion.    This is a normal study.       Echo 2/22/18  Summary   Normal left ventricle structure and function.   Ejection fraction is visually estimated at 50-60%.  Grade I diastolic dysfunction.   No significant valvular disease noted.   No evidence of pericardial effusion.     Doppler 3/26/18          No evidence of significant occlusive arterial disease.    Bilateral VERONICA's show normal arterial flow.             All labs, medications and tests reviewed by myself including data and history from outside source , patient and available family . Assessment & Plan:      1. Essential hypertension    2. Precordial pain    3. Type 2 diabetes mellitus without complication, without long-term current use of insulin ([address]       . Precordial pain  Chest pressure / pain occurs with stress , she feels it is ongoing for long time but less oftennow after adjustingmeds , The pain does not  radiate , she does have  shorntess of breath which gets better after she takes water pill.  Stress test shows no ischemia     Essential

## 2018-11-06 ENCOUNTER — HOSPITAL ENCOUNTER (OUTPATIENT)
Dept: SLEEP CENTER | Age: 58
Discharge: HOME | End: 2018-11-06
Payer: MEDICAID

## 2018-11-06 ASSESSMENT — SLEEP AND FATIGUE QUESTIONNAIRES
HOW LIKELY ARE YOU TO NOD OFF OR FALL ASLEEP WHILE LYING DOWN TO REST IN THE AFTERNOON WHEN CIRCUMSTANCES PERMIT: 0
HOW LIKELY ARE YOU TO NOD OFF OR FALL ASLEEP WHILE SITTING QUIETLY AFTER LUNCH WITHOUT ALCOHOL: 2
HOW LIKELY ARE YOU TO NOD OFF OR FALL ASLEEP WHILE SITTING INACTIVE IN A PUBLIC PLACE: 0
HOW LIKELY ARE YOU TO NOD OFF OR FALL ASLEEP WHEN YOU ARE A PASSENGER IN A CAR FOR AN HOUR WITHOUT A BREAK: 0
HOW LIKELY ARE YOU TO NOD OFF OR FALL ASLEEP WHILE WATCHING TV: 0
ESS TOTAL SCORE: 4
HOW LIKELY ARE YOU TO NOD OFF OR FALL ASLEEP WHILE SITTING AND TALKING TO SOMEONE: 0
HOW LIKELY ARE YOU TO NOD OFF OR FALL ASLEEP IN A CAR, WHILE STOPPED FOR A FEW MINUTES IN TRAFFIC: 0
HOW LIKELY ARE YOU TO NOD OFF OR FALL ASLEEP WHILE SITTING AND READING: 2

## 2018-11-06 NOTE — PROGRESS NOTES
James Austin MD, Rhonda Wei MD, Eliseo Hallman MD, Tacho Alvarez MD, [facility]      [address], [address] [facility]   PH: [phone]  F: [phone]     Subjective:     Patient ID: Nabila Mac is a 62 y.o. female, referred to the sleep center for   Chief Complaint   Patient presents with    Sleep Apnea    1 Month Follow-Up   .doing better    Referring physician:  Naveed Chung    History:    Social History     Social History    Marital status: Legally      Spouse name: N/A    Number of children: N/A    Years of education: N/A     Occupational History    Not on file. Social History Main Topics    Smoking status: Former Smoker    Smokeless tobacco: Never Used    Alcohol use No    Drug use: No    Sexual activity: Not on file     Other Topics Concern    Not on file     Social History Narrative    No narrative on file       Prior to Admission medications    Medication Sig Start Date End Date Taking? Authorizing Provider   pravastatin (PRAVACHOL) 20 MG tablet take 1 tablet by mouth once daily 7/2/18  Yes Peter Richardson MD   amLODIPine-atorvastatatin (CADUET) 10-40 MG per tablet Take 1 tablet by mouth daily   Yes Historical Provider, MD   dicyclomine (BENTYL) 20 MG tablet Take 20 mg by mouth every 6 hours   Yes Historical Provider, MD   VENTOLIN  (90 Base) MCG/ACT inhaler  2/27/18  Yes Historical Provider, MD BERMEO 0.01 % SOLN ophthalmic drops  1/22/18  Yes Historical Provider, MD   HYDROcodone-acetaminophen (NORCO) 5-325 MG per tablet 1 tablet 3 times daily as needed.  2/15/18  Yes Historical Provider, MD   theophylline (THEODUR) 300 MG extended release tablet 1 tablet daily 2/11/18  Yes Historical Provider, MD   furosemide (LASIX) 20 MG tablet take 1/2 tablet every few weeks 1/16/18  Yes Historical Provider, MD   ibuprofen (ADVIL;MOTRIN) 600 MG tablet Take 1 tablet by mouth

## 2018-11-07 ENCOUNTER — HOSPITAL ENCOUNTER (EMERGENCY)
Age: 58
Discharge: HOME | End: 2018-11-07
Payer: MEDICAID

## 2018-11-07 VITALS
HEIGHT: 68 IN | HEART RATE: 66 BPM | DIASTOLIC BLOOD PRESSURE: 80 MMHG | OXYGEN SATURATION: 96 % | TEMPERATURE: 97.8 F | SYSTOLIC BLOOD PRESSURE: 153 MMHG | RESPIRATION RATE: 16 BRPM | WEIGHT: 293 LBS | BODY MASS INDEX: 44.41 KG/M2

## 2018-11-07 DIAGNOSIS — M10.9 ACUTE GOUT OF RIGHT ANKLE, UNSPECIFIED CAUSE: Primary | ICD-10-CM

## 2018-11-07 PROCEDURE — 99282 EMERGENCY DEPT VISIT SF MDM: CPT

## 2018-11-07 PROCEDURE — 6370000000 HC RX 637 (ALT 250 FOR IP): Performed by: PHYSICIAN ASSISTANT

## 2018-11-07 PROCEDURE — 6360000002 HC RX W HCPCS: Performed by: PHYSICIAN ASSISTANT

## 2018-11-07 PROCEDURE — 96372 THER/PROPH/DIAG INJ SC/IM: CPT

## 2018-11-07 RX ORDER — COLCHICINE 0.6 MG/1
0.6 TABLET ORAL DAILY
Qty: 1 TABLET | Refills: 0 | Status: ON HOLD | OUTPATIENT
Start: 2018-11-07 | End: 2019-02-23

## 2018-11-07 RX ORDER — COLCHICINE 0.6 MG/1
1.2 TABLET ORAL ONCE
Status: COMPLETED | OUTPATIENT
Start: 2018-11-07 | End: 2018-11-07

## 2018-11-07 RX ORDER — DEXAMETHASONE SODIUM PHOSPHATE 4 MG/ML
4 INJECTION, SOLUTION INTRA-ARTICULAR; INTRALESIONAL; INTRAMUSCULAR; INTRAVENOUS; SOFT TISSUE ONCE
Status: COMPLETED | OUTPATIENT
Start: 2018-11-07 | End: 2018-11-07

## 2018-11-07 RX ADMIN — DEXAMETHASONE SODIUM PHOSPHATE 4 MG: 4 INJECTION, SOLUTION INTRA-ARTICULAR; INTRALESIONAL; INTRAMUSCULAR; INTRAVENOUS; SOFT TISSUE at 12:11

## 2018-11-07 RX ADMIN — COLCHICINE 1.2 MG: 0.6 TABLET, FILM COATED ORAL at 12:57

## 2018-11-07 ASSESSMENT — PAIN DESCRIPTION - PAIN TYPE: TYPE: ACUTE PAIN

## 2018-11-07 ASSESSMENT — PAIN DESCRIPTION - LOCATION: LOCATION: FOOT

## 2018-11-07 ASSESSMENT — PAIN SCALES - GENERAL: PAINLEVEL_OUTOF10: 8

## 2018-11-07 ASSESSMENT — PAIN DESCRIPTION - ORIENTATION: ORIENTATION: RIGHT

## 2018-11-16 ENCOUNTER — HOSPITAL ENCOUNTER (EMERGENCY)
Age: 58
Discharge: HOME | End: 2018-11-16
Payer: MEDICAID

## 2018-11-16 VITALS
WEIGHT: 293 LBS | SYSTOLIC BLOOD PRESSURE: 161 MMHG | HEART RATE: 77 BPM | HEIGHT: 68 IN | OXYGEN SATURATION: 94 % | TEMPERATURE: 98.8 F | BODY MASS INDEX: 44.41 KG/M2 | RESPIRATION RATE: 18 BRPM | DIASTOLIC BLOOD PRESSURE: 91 MMHG

## 2018-11-16 DIAGNOSIS — M79.602 LEFT ARM PAIN: Primary | ICD-10-CM

## 2018-11-16 PROCEDURE — 99283 EMERGENCY DEPT VISIT LOW MDM: CPT

## 2018-11-16 ASSESSMENT — PAIN DESCRIPTION - LOCATION: LOCATION: ARM

## 2018-11-16 ASSESSMENT — PAIN DESCRIPTION - PAIN TYPE: TYPE: ACUTE PAIN;CHRONIC PAIN

## 2018-11-16 ASSESSMENT — PAIN SCALES - GENERAL: PAINLEVEL_OUTOF10: 8

## 2018-11-16 ASSESSMENT — PAIN DESCRIPTION - ORIENTATION: ORIENTATION: LEFT

## 2018-11-16 NOTE — ED PROVIDER NOTES
Alert and oriented. Distal sensation intact. No functional deficits of elbows, wrists, hands, or fingers.  strength 5 out of 5 in left upper extremity. Psychiatric: Cooperative, pleasant affect        RADIOLOGY/PROCEDURES    No orders to display              ED COURSE & MEDICAL DECISION MAKING       Vital signs and nursing notes reviewed during ED course. I have independently evaluated this patient. Supervising physician present in the Emergency Department, available for consultation, throughout entirety of patient care. History and exam consistent with Musculoskeletal pain of left upper extremity. Patient's pain is reproduced with palpation of the biceps and brachioradialis regions. She has pain when she utilizes these muscles on exam.  Patient is not tender over the shoulder or elbow itself and therefore low suspicion for joint infection, septic arthritis, fracture, dislocation. I suspect strain of biceps/brachioradialis musculature. I recommend rest and ice. While in the ED, patient received Ace wrap of affected region for comfort. Affected extremity is distally neurovascularly intact. Patient has no exertional chest pain. She has no diaphoresis, nausea, vomiting, shortness of breath. Patient declines EKG today and is adamant that this is not her heart- she understands that EKG would be for screening purposes for STEMI/signs of cardiac ischemia. Comments. I do have low clinical suspicion for ACS. I have low clinical suspicion for septic joint, nerve injury, vascular injury. Negative Spurling test on exam today. Patient inquired about having an x-ray today and discussed this and I discussed to have low suspicion for fracture and dislocation and at this time patient is comfortable with conservative treatment and if no improvement with conservative treatment then following up with PCP/orthopedist for further workup.   Patient received prescription for diclofenac gel- we discussed this

## 2018-11-20 ENCOUNTER — HOSPITAL ENCOUNTER (EMERGENCY)
Age: 58
Discharge: HOME | End: 2018-11-20
Payer: MEDICAID

## 2018-11-20 VITALS
DIASTOLIC BLOOD PRESSURE: 81 MMHG | HEIGHT: 68 IN | WEIGHT: 293 LBS | BODY MASS INDEX: 44.41 KG/M2 | HEART RATE: 79 BPM | OXYGEN SATURATION: 95 % | RESPIRATION RATE: 16 BRPM | SYSTOLIC BLOOD PRESSURE: 167 MMHG | TEMPERATURE: 97.5 F

## 2018-11-20 DIAGNOSIS — J06.9 URI WITH COUGH AND CONGESTION: Primary | ICD-10-CM

## 2018-11-20 DIAGNOSIS — J02.9 ACUTE PHARYNGITIS, UNSPECIFIED ETIOLOGY: ICD-10-CM

## 2018-11-20 PROCEDURE — 6370000000 HC RX 637 (ALT 250 FOR IP): Performed by: NURSE PRACTITIONER

## 2018-11-20 PROCEDURE — 99283 EMERGENCY DEPT VISIT LOW MDM: CPT

## 2018-11-20 PROCEDURE — 94761 N-INVAS EAR/PLS OXIMETRY MLT: CPT

## 2018-11-20 PROCEDURE — 94640 AIRWAY INHALATION TREATMENT: CPT

## 2018-11-20 RX ORDER — ACETAMINOPHEN 500 MG
1000 TABLET ORAL ONCE
Status: COMPLETED | OUTPATIENT
Start: 2018-11-20 | End: 2018-11-20

## 2018-11-20 RX ORDER — ACETAMINOPHEN 325 MG/1
650 TABLET ORAL EVERY 6 HOURS PRN
Qty: 60 TABLET | Refills: 0 | Status: SHIPPED | OUTPATIENT
Start: 2018-11-20 | End: 2021-04-14

## 2018-11-20 RX ORDER — DOXYCYCLINE HYCLATE 100 MG
100 TABLET ORAL 2 TIMES DAILY
Qty: 20 TABLET | Refills: 0 | Status: SHIPPED | OUTPATIENT
Start: 2018-11-20 | End: 2018-11-30

## 2018-11-20 RX ORDER — IPRATROPIUM BROMIDE AND ALBUTEROL SULFATE 2.5; .5 MG/3ML; MG/3ML
1 SOLUTION RESPIRATORY (INHALATION) ONCE
Status: COMPLETED | OUTPATIENT
Start: 2018-11-20 | End: 2018-11-20

## 2018-11-20 RX ADMIN — ACETAMINOPHEN 1000 MG: 500 TABLET ORAL at 11:48

## 2018-11-20 RX ADMIN — LIDOCAINE HYDROCHLORIDE 15 ML: 20 SOLUTION ORAL; TOPICAL at 11:49

## 2018-11-20 RX ADMIN — IPRATROPIUM BROMIDE AND ALBUTEROL SULFATE 1 AMPULE: .5; 3 SOLUTION RESPIRATORY (INHALATION) at 11:31

## 2018-11-20 ASSESSMENT — PAIN DESCRIPTION - PAIN TYPE: TYPE: ACUTE PAIN

## 2018-11-20 ASSESSMENT — PAIN DESCRIPTION - LOCATION: LOCATION: THROAT;EAR

## 2018-11-20 ASSESSMENT — PAIN DESCRIPTION - DESCRIPTORS: DESCRIPTORS: SORE;SHARP

## 2018-11-20 ASSESSMENT — PAIN DESCRIPTION - ORIENTATION: ORIENTATION: RIGHT

## 2018-11-20 ASSESSMENT — PAIN SCALES - GENERAL: PAINLEVEL_OUTOF10: 7

## 2018-11-20 NOTE — ED PROVIDER NOTES
injection Inject  into the skin nightly.  insulin lispro (HUMALOG) 100 UNIT/ML injection Inject  into the skin 3 times daily (before meals). ALLERGIES    Allergies   Allergen Reactions    Codeine     Adhesive Tape Itching    Toradol [Ketorolac Tromethamine] Other (See Comments)     Headache for 5-6 days       FAMILY AND SOCIAL HISTORY    Family History   Problem Relation Age of Onset    Cancer Mother     Diabetes Mother     Heart Disease Mother     Cancer Father     High Blood Pressure Father     Cancer Other     Cancer Other     Diabetes Sister     Heart Disease Sister     High Blood Pressure Brother     Depression Sister      Social History     Social History    Marital status: Legally      Spouse name: N/A    Number of children: N/A    Years of education: N/A     Social History Main Topics    Smoking status: Former Smoker    Smokeless tobacco: Never Used    Alcohol use No    Drug use: No    Sexual activity: Not Asked     Other Topics Concern    None     Social History Narrative    None       PHYSICAL EXAM    VITAL SIGNS: BP (!) 167/81   Pulse 79   Temp 97.5 °F (36.4 °C) (Oral)   Resp 16   Ht 5' 8\" (1.727 m)   Wt (!) 320 lb (145.2 kg)   SpO2 95%   BMI 48.66 kg/m²   Constitutional:  Well developed, well nourished, no acute distress, non-toxic appearance   Eyes: Conjunctiva normal, sclera non-icteric  HEENT:     - Normocephalic, atraumatic   - PERRL, EOM intact. Conjunctiva normal    -  Frontal/Maxillary sinuses tender to percussion.   - External auditory canals clear   - right TM positive for clear fluid, no erythema or bulging.   - Nasal passages with mildly erythematous and edematous turbinates. - Oropharynx mildly erythematous , and swollen left>right, possible uvular deviation to the right, no exudate. Neck/Lymphatics:    -+ diffuse left sided cervical lymphadenopathy, supple, no JVD,     Respiratory:    bilat inspiratory and expiratory wheezing noted.

## 2018-12-17 ENCOUNTER — HOSPITAL ENCOUNTER (OUTPATIENT)
Age: 58
Discharge: HOME | End: 2018-12-17
Payer: MEDICAID

## 2018-12-17 LAB
ALBUMIN SERPL-MCNC: 4.1 GM/DL (ref 3.4–5)
ALP BLD-CCNC: 92 IU/L (ref 40–128)
ALT SERPL-CCNC: 57 U/L (ref 10–40)
ANION GAP SERPL CALCULATED.3IONS-SCNC: 11 MMOL/L (ref 4–16)
AST SERPL-CCNC: 78 IU/L (ref 15–37)
BILIRUB SERPL-MCNC: 0.3 MG/DL (ref 0–1)
BUN BLDV-MCNC: 10 MG/DL (ref 6–23)
CALCIUM SERPL-MCNC: 9.2 MG/DL (ref 8.3–10.6)
CHLORIDE BLD-SCNC: 100 MMOL/L (ref 99–110)
CHOLESTEROL: 105 MG/DL
CO2: 30 MMOL/L (ref 21–32)
CREAT SERPL-MCNC: 0.8 MG/DL (ref 0.6–1.1)
ESTIMATED AVERAGE GLUCOSE: 246 MG/DL
GFR AFRICAN AMERICAN: >60 ML/MIN/1.73M2
GFR NON-AFRICAN AMERICAN: >60 ML/MIN/1.73M2
GLUCOSE BLD-MCNC: 232 MG/DL (ref 70–99)
HBA1C MFR BLD: 10.2 % (ref 4.2–6.3)
HDLC SERPL-MCNC: 39 MG/DL
LDL CHOLESTEROL DIRECT: 55 MG/DL
POTASSIUM SERPL-SCNC: 4.1 MMOL/L (ref 3.5–5.1)
SODIUM BLD-SCNC: 141 MMOL/L (ref 135–145)
TOTAL PROTEIN: 7.1 GM/DL (ref 6.4–8.2)
TRIGL SERPL-MCNC: 113 MG/DL

## 2018-12-17 PROCEDURE — 80061 LIPID PANEL: CPT

## 2018-12-17 PROCEDURE — 80053 COMPREHEN METABOLIC PANEL: CPT

## 2018-12-17 PROCEDURE — 36415 COLL VENOUS BLD VENIPUNCTURE: CPT

## 2018-12-17 PROCEDURE — 83721 ASSAY OF BLOOD LIPOPROTEIN: CPT

## 2018-12-17 PROCEDURE — 83036 HEMOGLOBIN GLYCOSYLATED A1C: CPT

## 2019-01-13 ENCOUNTER — HOSPITAL ENCOUNTER (EMERGENCY)
Age: 59
Discharge: HOME | End: 2019-01-13
Payer: MEDICAID

## 2019-01-13 VITALS
SYSTOLIC BLOOD PRESSURE: 142 MMHG | DIASTOLIC BLOOD PRESSURE: 95 MMHG | RESPIRATION RATE: 18 BRPM | TEMPERATURE: 97.4 F | OXYGEN SATURATION: 94 % | BODY MASS INDEX: 44.41 KG/M2 | HEART RATE: 94 BPM | WEIGHT: 293 LBS | HEIGHT: 68 IN

## 2019-01-13 DIAGNOSIS — M79.671 RIGHT FOOT PAIN: Primary | ICD-10-CM

## 2019-01-13 DIAGNOSIS — M10.9 ACUTE GOUT OF RIGHT FOOT, UNSPECIFIED CAUSE: ICD-10-CM

## 2019-01-13 DIAGNOSIS — M25.571 ACUTE RIGHT ANKLE PAIN: ICD-10-CM

## 2019-01-13 PROCEDURE — 99282 EMERGENCY DEPT VISIT SF MDM: CPT

## 2019-01-13 PROCEDURE — 6360000002 HC RX W HCPCS: Performed by: PHYSICIAN ASSISTANT

## 2019-01-13 PROCEDURE — 6370000000 HC RX 637 (ALT 250 FOR IP): Performed by: PHYSICIAN ASSISTANT

## 2019-01-13 RX ORDER — PREDNISONE 50 MG/1
50 TABLET ORAL DAILY
Qty: 4 TABLET | Refills: 0 | Status: SHIPPED | OUTPATIENT
Start: 2019-01-13 | End: 2019-01-17

## 2019-01-13 RX ORDER — ONDANSETRON 4 MG/1
4 TABLET, ORALLY DISINTEGRATING ORAL ONCE
Status: COMPLETED | OUTPATIENT
Start: 2019-01-13 | End: 2019-01-13

## 2019-01-13 RX ORDER — INDOMETHACIN 25 MG/1
25 CAPSULE ORAL ONCE
Status: COMPLETED | OUTPATIENT
Start: 2019-01-13 | End: 2019-01-13

## 2019-01-13 RX ORDER — INDOMETHACIN 25 MG/1
25 CAPSULE ORAL 3 TIMES DAILY PRN
Qty: 15 CAPSULE | Refills: 0 | Status: ON HOLD | OUTPATIENT
Start: 2019-01-13 | End: 2019-02-23

## 2019-01-13 RX ADMIN — PREDNISONE 50 MG: 20 TABLET ORAL at 16:15

## 2019-01-13 RX ADMIN — INDOMETHACIN 25 MG: 25 CAPSULE ORAL at 16:18

## 2019-01-13 RX ADMIN — ONDANSETRON 4 MG: 4 TABLET, ORALLY DISINTEGRATING ORAL at 16:15

## 2019-01-13 ASSESSMENT — PAIN SCALES - GENERAL
PAINLEVEL_OUTOF10: 8
PAINLEVEL_OUTOF10: 8

## 2019-01-13 ASSESSMENT — PAIN DESCRIPTION - LOCATION: LOCATION: FOOT

## 2019-01-13 ASSESSMENT — PAIN DESCRIPTION - DESCRIPTORS: DESCRIPTORS: SHARP

## 2019-01-13 ASSESSMENT — PAIN DESCRIPTION - FREQUENCY: FREQUENCY: CONTINUOUS

## 2019-01-13 ASSESSMENT — PAIN DESCRIPTION - PAIN TYPE: TYPE: ACUTE PAIN

## 2019-01-22 RX ORDER — AMLODIPINE BESYLATE AND BENAZEPRIL HYDROCHLORIDE 10; 40 MG/1; MG/1
1 CAPSULE ORAL DAILY
Qty: 30 CAPSULE | Refills: 6 | Status: SHIPPED | OUTPATIENT
Start: 2019-01-22 | End: 2019-08-27 | Stop reason: SDUPTHER

## 2019-02-14 RX ORDER — PRAVASTATIN SODIUM 20 MG
TABLET ORAL
Qty: 30 TABLET | Refills: 5 | Status: SHIPPED | OUTPATIENT
Start: 2019-02-14 | End: 2019-05-24 | Stop reason: SDUPTHER

## 2019-02-22 ENCOUNTER — APPOINTMENT (OUTPATIENT)
Dept: GENERAL RADIOLOGY | Age: 59
DRG: 140 | End: 2019-02-22
Payer: MEDICAID

## 2019-02-22 ENCOUNTER — HOSPITAL ENCOUNTER (INPATIENT)
Age: 59
LOS: 3 days | Discharge: HOME | DRG: 140 | End: 2019-02-26
Attending: EMERGENCY MEDICINE | Admitting: HOSPITALIST
Payer: MEDICAID

## 2019-02-22 DIAGNOSIS — J44.1 COPD EXACERBATION (HCC): Primary | ICD-10-CM

## 2019-02-22 LAB
ALBUMIN SERPL-MCNC: 4.2 GM/DL (ref 3.4–5)
ALP BLD-CCNC: 90 IU/L (ref 40–129)
ALT SERPL-CCNC: 80 U/L (ref 10–40)
ANION GAP SERPL CALCULATED.3IONS-SCNC: 14 MMOL/L (ref 4–16)
AST SERPL-CCNC: 200 IU/L (ref 15–37)
BASOPHILS ABSOLUTE: 0 K/CU MM
BASOPHILS RELATIVE PERCENT: 0.2 % (ref 0–1)
BILIRUB SERPL-MCNC: 0.3 MG/DL (ref 0–1)
BUN BLDV-MCNC: 10 MG/DL (ref 6–23)
CALCIUM SERPL-MCNC: 9.2 MG/DL (ref 8.3–10.6)
CHLORIDE BLD-SCNC: 99 MMOL/L (ref 99–110)
CO2: 28 MMOL/L (ref 21–32)
CREAT SERPL-MCNC: 0.8 MG/DL (ref 0.6–1.1)
DIFFERENTIAL TYPE: ABNORMAL
EOSINOPHILS ABSOLUTE: 0.1 K/CU MM
EOSINOPHILS RELATIVE PERCENT: 2 % (ref 0–3)
GFR AFRICAN AMERICAN: >60 ML/MIN/1.73M2
GFR NON-AFRICAN AMERICAN: >60 ML/MIN/1.73M2
GLUCOSE BLD-MCNC: 196 MG/DL (ref 70–99)
HCT VFR BLD CALC: 43.7 % (ref 37–47)
HEMOGLOBIN: 13.1 GM/DL (ref 12.5–16)
IMMATURE NEUTROPHIL %: 0.5 % (ref 0–0.43)
LYMPHOCYTES ABSOLUTE: 1.1 K/CU MM
LYMPHOCYTES RELATIVE PERCENT: 17.7 % (ref 24–44)
MCH RBC QN AUTO: 25 PG (ref 27–31)
MCHC RBC AUTO-ENTMCNC: 30 % (ref 32–36)
MCV RBC AUTO: 83.4 FL (ref 78–100)
MONOCYTES ABSOLUTE: 0.5 K/CU MM
MONOCYTES RELATIVE PERCENT: 7.5 % (ref 0–4)
NUCLEATED RBC %: 0 %
PDW BLD-RTO: 15.4 % (ref 11.7–14.9)
PLATELET # BLD: 221 K/CU MM (ref 140–440)
PMV BLD AUTO: 10.9 FL (ref 7.5–11.1)
POTASSIUM SERPL-SCNC: 3.8 MMOL/L (ref 3.5–5.1)
RBC # BLD: 5.24 M/CU MM (ref 4.2–5.4)
SEGMENTED NEUTROPHILS ABSOLUTE COUNT: 4.7 K/CU MM
SEGMENTED NEUTROPHILS RELATIVE PERCENT: 72.1 % (ref 36–66)
SODIUM BLD-SCNC: 141 MMOL/L (ref 135–145)
TOTAL IMMATURE NEUTOROPHIL: 0.03 K/CU MM
TOTAL NUCLEATED RBC: 0 K/CU MM
TOTAL PROTEIN: 8 GM/DL (ref 6.4–8.2)
TROPONIN T: <0.01 NG/ML
WBC # BLD: 6.4 K/CU MM (ref 4–10.5)

## 2019-02-22 PROCEDURE — 6370000000 HC RX 637 (ALT 250 FOR IP): Performed by: EMERGENCY MEDICINE

## 2019-02-22 PROCEDURE — 84484 ASSAY OF TROPONIN QUANT: CPT

## 2019-02-22 PROCEDURE — 94640 AIRWAY INHALATION TREATMENT: CPT

## 2019-02-22 PROCEDURE — 71046 X-RAY EXAM CHEST 2 VIEWS: CPT

## 2019-02-22 PROCEDURE — 93010 ELECTROCARDIOGRAM REPORT: CPT | Performed by: INTERNAL MEDICINE

## 2019-02-22 PROCEDURE — 99285 EMERGENCY DEPT VISIT HI MDM: CPT

## 2019-02-22 PROCEDURE — 93005 ELECTROCARDIOGRAM TRACING: CPT | Performed by: EMERGENCY MEDICINE

## 2019-02-22 PROCEDURE — 80053 COMPREHEN METABOLIC PANEL: CPT

## 2019-02-22 PROCEDURE — 85025 COMPLETE CBC W/AUTO DIFF WBC: CPT

## 2019-02-22 RX ORDER — IPRATROPIUM BROMIDE AND ALBUTEROL SULFATE 2.5; .5 MG/3ML; MG/3ML
1 SOLUTION RESPIRATORY (INHALATION) ONCE
Status: COMPLETED | OUTPATIENT
Start: 2019-02-22 | End: 2019-02-22

## 2019-02-22 RX ORDER — LEVOFLOXACIN 500 MG/1
750 TABLET, FILM COATED ORAL ONCE
Status: COMPLETED | OUTPATIENT
Start: 2019-02-22 | End: 2019-02-22

## 2019-02-22 RX ORDER — ALBUTEROL SULFATE 2.5 MG/3ML
2.5 SOLUTION RESPIRATORY (INHALATION) ONCE
Status: COMPLETED | OUTPATIENT
Start: 2019-02-22 | End: 2019-02-23

## 2019-02-22 RX ORDER — PREDNISONE 20 MG/1
60 TABLET ORAL ONCE
Status: COMPLETED | OUTPATIENT
Start: 2019-02-22 | End: 2019-02-22

## 2019-02-22 RX ORDER — IPRATROPIUM BROMIDE AND ALBUTEROL SULFATE 2.5; .5 MG/3ML; MG/3ML
SOLUTION RESPIRATORY (INHALATION)
Status: DISCONTINUED
Start: 2019-02-22 | End: 2019-02-23

## 2019-02-22 RX ADMIN — IPRATROPIUM BROMIDE AND ALBUTEROL SULFATE 1 AMPULE: .5; 3 SOLUTION RESPIRATORY (INHALATION) at 22:16

## 2019-02-22 RX ADMIN — LEVOFLOXACIN 750 MG: 500 TABLET, FILM COATED ORAL at 22:00

## 2019-02-22 RX ADMIN — PREDNISONE 60 MG: 20 TABLET ORAL at 22:00

## 2019-02-22 RX ADMIN — IPRATROPIUM BROMIDE AND ALBUTEROL SULFATE 1 AMPULE: .5; 3 SOLUTION RESPIRATORY (INHALATION) at 22:09

## 2019-02-22 RX ADMIN — IPRATROPIUM BROMIDE AND ALBUTEROL SULFATE 1 AMPULE: .5; 3 SOLUTION RESPIRATORY (INHALATION) at 22:39

## 2019-02-22 ASSESSMENT — PAIN DESCRIPTION - DESCRIPTORS: DESCRIPTORS: PRESSURE

## 2019-02-22 ASSESSMENT — PAIN DESCRIPTION - LOCATION
LOCATION: CHEST
LOCATION: CHEST;BACK

## 2019-02-22 ASSESSMENT — PAIN DESCRIPTION - PAIN TYPE
TYPE: ACUTE PAIN
TYPE: ACUTE PAIN

## 2019-02-22 ASSESSMENT — PAIN SCALES - GENERAL: PAINLEVEL_OUTOF10: 8

## 2019-02-23 PROBLEM — J44.1 COPD EXACERBATION (HCC): Status: ACTIVE | Noted: 2019-02-23

## 2019-02-23 LAB
ADENOVIRUS DETECTION BY PCR: NOT DETECTED
BORDETELLA PERTUSSIS PCR: NOT DETECTED
CHLAMYDOPHILA PNEUMONIA PCR: NOT DETECTED
CORONAVIRUS 229E PCR: NOT DETECTED
CORONAVIRUS HKU1 PCR: NOT DETECTED
CORONAVIRUS NL63 PCR: NOT DETECTED
CORONAVIRUS OC43 PCR: NOT DETECTED
GLUCOSE BLD-MCNC: 340 MG/DL (ref 70–99)
GLUCOSE BLD-MCNC: 354 MG/DL (ref 70–99)
GLUCOSE BLD-MCNC: 386 MG/DL (ref 70–99)
GLUCOSE BLD-MCNC: 391 MG/DL (ref 70–99)
GLUCOSE BLD-MCNC: 410 MG/DL (ref 70–99)
HUMAN METAPNEUMOVIRUS PCR: NOT DETECTED
INFLUENZA A BY PCR: NOT DETECTED
INFLUENZA A H1 (2009) PCR: NOT DETECTED
INFLUENZA A H1 PANDEMIC PCR: NOT DETECTED
INFLUENZA A H3 PCR: NOT DETECTED
INFLUENZA B BY PCR: NOT DETECTED
MYCOPLASMA PNEUMONIAE PCR: NOT DETECTED
PARAINFLUENZA 1 PCR: NOT DETECTED
PARAINFLUENZA 2 PCR: NOT DETECTED
PARAINFLUENZA 3 PCR: NOT DETECTED
PARAINFLUENZA 4 PCR: NOT DETECTED
RHINOVIRUS ENTEROVIRUS PCR: ABNORMAL
RSV PCR: NOT DETECTED

## 2019-02-23 PROCEDURE — 6370000000 HC RX 637 (ALT 250 FOR IP): Performed by: HOSPITALIST

## 2019-02-23 PROCEDURE — 6370000000 HC RX 637 (ALT 250 FOR IP): Performed by: INTERNAL MEDICINE

## 2019-02-23 PROCEDURE — 2700000000 HC OXYGEN THERAPY PER DAY

## 2019-02-23 PROCEDURE — 1200000000 HC SEMI PRIVATE

## 2019-02-23 PROCEDURE — 87798 DETECT AGENT NOS DNA AMP: CPT

## 2019-02-23 PROCEDURE — G0378 HOSPITAL OBSERVATION PER HR: HCPCS

## 2019-02-23 PROCEDURE — 87581 M.PNEUMON DNA AMP PROBE: CPT

## 2019-02-23 PROCEDURE — 94640 AIRWAY INHALATION TREATMENT: CPT

## 2019-02-23 PROCEDURE — 6360000002 HC RX W HCPCS: Performed by: HOSPITALIST

## 2019-02-23 PROCEDURE — 99253 IP/OBS CNSLTJ NEW/EST LOW 45: CPT | Performed by: INTERNAL MEDICINE

## 2019-02-23 PROCEDURE — 87486 CHLMYD PNEUM DNA AMP PROBE: CPT

## 2019-02-23 PROCEDURE — 6360000002 HC RX W HCPCS: Performed by: EMERGENCY MEDICINE

## 2019-02-23 PROCEDURE — 82962 GLUCOSE BLOOD TEST: CPT

## 2019-02-23 PROCEDURE — 2580000003 HC RX 258: Performed by: INTERNAL MEDICINE

## 2019-02-23 PROCEDURE — 6360000002 HC RX W HCPCS: Performed by: INTERNAL MEDICINE

## 2019-02-23 PROCEDURE — 2580000003 HC RX 258: Performed by: HOSPITALIST

## 2019-02-23 RX ORDER — ACETAMINOPHEN 325 MG/1
650 TABLET ORAL EVERY 4 HOURS PRN
Status: DISCONTINUED | OUTPATIENT
Start: 2019-02-23 | End: 2019-02-26 | Stop reason: HOSPADM

## 2019-02-23 RX ORDER — PRAVASTATIN SODIUM 20 MG
20 TABLET ORAL NIGHTLY
Status: DISCONTINUED | OUTPATIENT
Start: 2019-02-23 | End: 2019-02-26 | Stop reason: HOSPADM

## 2019-02-23 RX ORDER — DEXTROSE MONOHYDRATE 50 MG/ML
100 INJECTION, SOLUTION INTRAVENOUS PRN
Status: DISCONTINUED | OUTPATIENT
Start: 2019-02-23 | End: 2019-02-26 | Stop reason: HOSPADM

## 2019-02-23 RX ORDER — ONDANSETRON 2 MG/ML
4 INJECTION INTRAMUSCULAR; INTRAVENOUS EVERY 6 HOURS PRN
Status: DISCONTINUED | OUTPATIENT
Start: 2019-02-23 | End: 2019-02-26 | Stop reason: HOSPADM

## 2019-02-23 RX ORDER — AMLODIPINE BESYLATE AND BENAZEPRIL HYDROCHLORIDE 10; 40 MG/1; MG/1
1 CAPSULE ORAL DAILY
Status: DISCONTINUED | OUTPATIENT
Start: 2019-02-23 | End: 2019-02-23 | Stop reason: CLARIF

## 2019-02-23 RX ORDER — ACETAMINOPHEN 325 MG/1
650 TABLET ORAL EVERY 6 HOURS PRN
Status: DISCONTINUED | OUTPATIENT
Start: 2019-02-23 | End: 2019-02-23 | Stop reason: SDUPTHER

## 2019-02-23 RX ORDER — IPRATROPIUM BROMIDE AND ALBUTEROL SULFATE 2.5; .5 MG/3ML; MG/3ML
1 SOLUTION RESPIRATORY (INHALATION)
Status: DISCONTINUED | OUTPATIENT
Start: 2019-02-23 | End: 2019-02-26 | Stop reason: HOSPADM

## 2019-02-23 RX ORDER — INSULIN GLARGINE 100 [IU]/ML
50 INJECTION, SOLUTION SUBCUTANEOUS NIGHTLY
Status: DISCONTINUED | OUTPATIENT
Start: 2019-02-23 | End: 2019-02-23

## 2019-02-23 RX ORDER — DEXTROSE MONOHYDRATE 25 G/50ML
12.5 INJECTION, SOLUTION INTRAVENOUS PRN
Status: DISCONTINUED | OUTPATIENT
Start: 2019-02-23 | End: 2019-02-26 | Stop reason: HOSPADM

## 2019-02-23 RX ORDER — FUROSEMIDE 20 MG/1
20 TABLET ORAL DAILY
Status: DISCONTINUED | OUTPATIENT
Start: 2019-02-23 | End: 2019-02-26 | Stop reason: HOSPADM

## 2019-02-23 RX ORDER — METHYLPREDNISOLONE SODIUM SUCCINATE 40 MG/ML
40 INJECTION, POWDER, LYOPHILIZED, FOR SOLUTION INTRAMUSCULAR; INTRAVENOUS EVERY 6 HOURS
Status: COMPLETED | OUTPATIENT
Start: 2019-02-23 | End: 2019-02-24

## 2019-02-23 RX ORDER — LISINOPRIL 40 MG/1
40 TABLET ORAL DAILY
Status: DISCONTINUED | OUTPATIENT
Start: 2019-02-23 | End: 2019-02-26 | Stop reason: HOSPADM

## 2019-02-23 RX ORDER — INSULIN GLARGINE 100 [IU]/ML
50 INJECTION, SOLUTION SUBCUTANEOUS NIGHTLY
Status: DISCONTINUED | OUTPATIENT
Start: 2019-02-23 | End: 2019-02-26 | Stop reason: HOSPADM

## 2019-02-23 RX ORDER — NICOTINE POLACRILEX 4 MG
15 LOZENGE BUCCAL PRN
Status: DISCONTINUED | OUTPATIENT
Start: 2019-02-23 | End: 2019-02-26 | Stop reason: HOSPADM

## 2019-02-23 RX ORDER — SODIUM CHLORIDE 0.9 % (FLUSH) 0.9 %
10 SYRINGE (ML) INJECTION EVERY 12 HOURS SCHEDULED
Status: DISCONTINUED | OUTPATIENT
Start: 2019-02-23 | End: 2019-02-26 | Stop reason: HOSPADM

## 2019-02-23 RX ORDER — HYDROCODONE BITARTRATE AND ACETAMINOPHEN 5; 325 MG/1; MG/1
1 TABLET ORAL EVERY 6 HOURS PRN
Status: DISCONTINUED | OUTPATIENT
Start: 2019-02-23 | End: 2019-02-26 | Stop reason: HOSPADM

## 2019-02-23 RX ORDER — LATANOPROST 50 UG/ML
1 SOLUTION/ DROPS OPHTHALMIC DAILY
Status: DISCONTINUED | OUTPATIENT
Start: 2019-02-23 | End: 2019-02-26 | Stop reason: HOSPADM

## 2019-02-23 RX ORDER — GUAIFENESIN 600 MG/1
600 TABLET, EXTENDED RELEASE ORAL 2 TIMES DAILY
Status: DISCONTINUED | OUTPATIENT
Start: 2019-02-23 | End: 2019-02-26 | Stop reason: HOSPADM

## 2019-02-23 RX ORDER — DULOXETIN HYDROCHLORIDE 30 MG/1
60 CAPSULE, DELAYED RELEASE ORAL DAILY
Status: DISCONTINUED | OUTPATIENT
Start: 2019-02-23 | End: 2019-02-26 | Stop reason: HOSPADM

## 2019-02-23 RX ORDER — PREGABALIN 75 MG/1
75 CAPSULE ORAL DAILY
Status: DISCONTINUED | OUTPATIENT
Start: 2019-02-23 | End: 2019-02-26 | Stop reason: HOSPADM

## 2019-02-23 RX ORDER — SODIUM CHLORIDE 0.9 % (FLUSH) 0.9 %
10 SYRINGE (ML) INJECTION PRN
Status: DISCONTINUED | OUTPATIENT
Start: 2019-02-23 | End: 2019-02-26 | Stop reason: HOSPADM

## 2019-02-23 RX ORDER — PANTOPRAZOLE SODIUM 40 MG/1
40 TABLET, DELAYED RELEASE ORAL DAILY
Status: DISCONTINUED | OUTPATIENT
Start: 2019-02-23 | End: 2019-02-26 | Stop reason: HOSPADM

## 2019-02-23 RX ORDER — AMLODIPINE BESYLATE 10 MG/1
10 TABLET ORAL DAILY
Status: DISCONTINUED | OUTPATIENT
Start: 2019-02-23 | End: 2019-02-26 | Stop reason: HOSPADM

## 2019-02-23 RX ORDER — PREDNISONE 20 MG/1
40 TABLET ORAL DAILY
Status: DISCONTINUED | OUTPATIENT
Start: 2019-02-25 | End: 2019-02-26 | Stop reason: HOSPADM

## 2019-02-23 RX ADMIN — AMLODIPINE BESYLATE 10 MG: 10 TABLET ORAL at 08:50

## 2019-02-23 RX ADMIN — LATANOPROST 1 DROP: 50 SOLUTION OPHTHALMIC at 21:28

## 2019-02-23 RX ADMIN — CEFTRIAXONE 1 G: 1 INJECTION, POWDER, FOR SOLUTION INTRAMUSCULAR; INTRAVENOUS at 14:13

## 2019-02-23 RX ADMIN — PREGABALIN 75 MG: 75 CAPSULE ORAL at 09:38

## 2019-02-23 RX ADMIN — ALBUTEROL SULFATE 2.5 MG: 2.5 SOLUTION RESPIRATORY (INHALATION) at 00:03

## 2019-02-23 RX ADMIN — INSULIN LISPRO 15 UNITS: 100 INJECTION, SOLUTION INTRAVENOUS; SUBCUTANEOUS at 12:58

## 2019-02-23 RX ADMIN — METHYLPREDNISOLONE SODIUM SUCCINATE 40 MG: 40 INJECTION, POWDER, LYOPHILIZED, FOR SOLUTION INTRAMUSCULAR; INTRAVENOUS at 21:27

## 2019-02-23 RX ADMIN — INSULIN LISPRO 40 UNITS: 100 INJECTION, SOLUTION INTRAVENOUS; SUBCUTANEOUS at 09:01

## 2019-02-23 RX ADMIN — INSULIN GLARGINE 50 UNITS: 100 INJECTION, SOLUTION SUBCUTANEOUS at 21:25

## 2019-02-23 RX ADMIN — ENOXAPARIN SODIUM 40 MG: 40 INJECTION SUBCUTANEOUS at 08:50

## 2019-02-23 RX ADMIN — AZITHROMYCIN MONOHYDRATE 500 MG: 500 INJECTION, POWDER, LYOPHILIZED, FOR SOLUTION INTRAVENOUS at 04:04

## 2019-02-23 RX ADMIN — INSULIN GLARGINE 50 UNITS: 100 INJECTION, SOLUTION SUBCUTANEOUS at 04:57

## 2019-02-23 RX ADMIN — HYDROCODONE BITARTRATE AND ACETAMINOPHEN 1 TABLET: 5; 325 TABLET ORAL at 21:27

## 2019-02-23 RX ADMIN — GUAIFENESIN 600 MG: 600 TABLET, EXTENDED RELEASE ORAL at 21:27

## 2019-02-23 RX ADMIN — INSULIN LISPRO 5 UNITS: 100 INJECTION, SOLUTION INTRAVENOUS; SUBCUTANEOUS at 04:57

## 2019-02-23 RX ADMIN — THEOPHYLLINE ANHYDROUS 300 MG: 300 CAPSULE, EXTENDED RELEASE ORAL at 08:50

## 2019-02-23 RX ADMIN — THEOPHYLLINE ANHYDROUS 300 MG: 300 CAPSULE, EXTENDED RELEASE ORAL at 21:26

## 2019-02-23 RX ADMIN — PANTOPRAZOLE SODIUM 40 MG: 40 TABLET, DELAYED RELEASE ORAL at 08:11

## 2019-02-23 RX ADMIN — METHYLPREDNISOLONE SODIUM SUCCINATE 40 MG: 40 INJECTION, POWDER, LYOPHILIZED, FOR SOLUTION INTRAMUSCULAR; INTRAVENOUS at 04:05

## 2019-02-23 RX ADMIN — IPRATROPIUM BROMIDE AND ALBUTEROL SULFATE 1 AMPULE: .5; 3 SOLUTION RESPIRATORY (INHALATION) at 09:35

## 2019-02-23 RX ADMIN — INSULIN LISPRO 18 UNITS: 100 INJECTION, SOLUTION INTRAVENOUS; SUBCUTANEOUS at 21:26

## 2019-02-23 RX ADMIN — INSULIN LISPRO 8 UNITS: 100 INJECTION, SOLUTION INTRAVENOUS; SUBCUTANEOUS at 09:02

## 2019-02-23 RX ADMIN — METHYLPREDNISOLONE SODIUM SUCCINATE 40 MG: 40 INJECTION, POWDER, LYOPHILIZED, FOR SOLUTION INTRAMUSCULAR; INTRAVENOUS at 14:13

## 2019-02-23 RX ADMIN — INSULIN LISPRO 20 UNITS: 100 INJECTION, SOLUTION INTRAVENOUS; SUBCUTANEOUS at 12:58

## 2019-02-23 RX ADMIN — DULOXETINE HYDROCHLORIDE 60 MG: 30 CAPSULE, DELAYED RELEASE ORAL at 08:49

## 2019-02-23 RX ADMIN — SODIUM CHLORIDE, PRESERVATIVE FREE 10 ML: 5 INJECTION INTRAVENOUS at 21:29

## 2019-02-23 RX ADMIN — FUROSEMIDE 20 MG: 20 TABLET ORAL at 08:50

## 2019-02-23 RX ADMIN — INSULIN LISPRO 15 UNITS: 100 INJECTION, SOLUTION INTRAVENOUS; SUBCUTANEOUS at 16:26

## 2019-02-23 RX ADMIN — INSULIN LISPRO 20 UNITS: 100 INJECTION, SOLUTION INTRAVENOUS; SUBCUTANEOUS at 16:30

## 2019-02-23 RX ADMIN — METHYLPREDNISOLONE SODIUM SUCCINATE 40 MG: 40 INJECTION, POWDER, LYOPHILIZED, FOR SOLUTION INTRAMUSCULAR; INTRAVENOUS at 08:42

## 2019-02-23 RX ADMIN — PRAVASTATIN SODIUM 20 MG: 20 TABLET ORAL at 21:27

## 2019-02-23 RX ADMIN — GUAIFENESIN 600 MG: 600 TABLET, EXTENDED RELEASE ORAL at 14:13

## 2019-02-23 RX ADMIN — SODIUM CHLORIDE, PRESERVATIVE FREE 10 ML: 5 INJECTION INTRAVENOUS at 08:50

## 2019-02-23 RX ADMIN — IPRATROPIUM BROMIDE AND ALBUTEROL SULFATE 1 AMPULE: .5; 3 SOLUTION RESPIRATORY (INHALATION) at 22:12

## 2019-02-23 ASSESSMENT — PAIN DESCRIPTION - PAIN TYPE
TYPE: CHRONIC PAIN

## 2019-02-23 ASSESSMENT — PAIN SCALES - GENERAL
PAINLEVEL_OUTOF10: 0
PAINLEVEL_OUTOF10: 8
PAINLEVEL_OUTOF10: 8
PAINLEVEL_OUTOF10: 9

## 2019-02-23 ASSESSMENT — PAIN DESCRIPTION - LOCATION
LOCATION: BACK;LEG

## 2019-02-23 ASSESSMENT — PAIN DESCRIPTION - DESCRIPTORS
DESCRIPTORS: ACHING

## 2019-02-24 LAB
GLUCOSE BLD-MCNC: 304 MG/DL (ref 70–99)
GLUCOSE BLD-MCNC: 353 MG/DL (ref 70–99)
GLUCOSE BLD-MCNC: 360 MG/DL (ref 70–99)
GLUCOSE BLD-MCNC: 370 MG/DL (ref 70–99)
GLUCOSE BLD-MCNC: 370 MG/DL (ref 70–99)

## 2019-02-24 PROCEDURE — 2580000003 HC RX 258: Performed by: INTERNAL MEDICINE

## 2019-02-24 PROCEDURE — 2580000003 HC RX 258: Performed by: HOSPITALIST

## 2019-02-24 PROCEDURE — 1200000000 HC SEMI PRIVATE

## 2019-02-24 PROCEDURE — 99232 SBSQ HOSP IP/OBS MODERATE 35: CPT | Performed by: INTERNAL MEDICINE

## 2019-02-24 PROCEDURE — G0378 HOSPITAL OBSERVATION PER HR: HCPCS

## 2019-02-24 PROCEDURE — 94660 CPAP INITIATION&MGMT: CPT

## 2019-02-24 PROCEDURE — 6370000000 HC RX 637 (ALT 250 FOR IP): Performed by: INTERNAL MEDICINE

## 2019-02-24 PROCEDURE — 6360000002 HC RX W HCPCS: Performed by: INTERNAL MEDICINE

## 2019-02-24 PROCEDURE — 6360000002 HC RX W HCPCS: Performed by: HOSPITALIST

## 2019-02-24 PROCEDURE — 94761 N-INVAS EAR/PLS OXIMETRY MLT: CPT

## 2019-02-24 PROCEDURE — 82962 GLUCOSE BLOOD TEST: CPT

## 2019-02-24 PROCEDURE — 6370000000 HC RX 637 (ALT 250 FOR IP): Performed by: HOSPITALIST

## 2019-02-24 PROCEDURE — 94640 AIRWAY INHALATION TREATMENT: CPT

## 2019-02-24 PROCEDURE — 2700000000 HC OXYGEN THERAPY PER DAY

## 2019-02-24 PROCEDURE — 2140000000 HC CCU INTERMEDIATE R&B

## 2019-02-24 RX ADMIN — METHYLPREDNISOLONE SODIUM SUCCINATE 40 MG: 40 INJECTION, POWDER, LYOPHILIZED, FOR SOLUTION INTRAMUSCULAR; INTRAVENOUS at 10:18

## 2019-02-24 RX ADMIN — THEOPHYLLINE ANHYDROUS 300 MG: 300 CAPSULE, EXTENDED RELEASE ORAL at 21:48

## 2019-02-24 RX ADMIN — DULOXETINE HYDROCHLORIDE 60 MG: 30 CAPSULE, DELAYED RELEASE ORAL at 10:18

## 2019-02-24 RX ADMIN — SODIUM CHLORIDE, PRESERVATIVE FREE 10 ML: 5 INJECTION INTRAVENOUS at 21:49

## 2019-02-24 RX ADMIN — INSULIN LISPRO 25 UNITS: 100 INJECTION, SOLUTION INTRAVENOUS; SUBCUTANEOUS at 10:30

## 2019-02-24 RX ADMIN — AMLODIPINE BESYLATE 10 MG: 10 TABLET ORAL at 10:18

## 2019-02-24 RX ADMIN — IPRATROPIUM BROMIDE AND ALBUTEROL SULFATE 1 AMPULE: .5; 3 SOLUTION RESPIRATORY (INHALATION) at 08:15

## 2019-02-24 RX ADMIN — GUAIFENESIN 600 MG: 600 TABLET, EXTENDED RELEASE ORAL at 21:48

## 2019-02-24 RX ADMIN — METHYLPREDNISOLONE SODIUM SUCCINATE 40 MG: 40 INJECTION, POWDER, LYOPHILIZED, FOR SOLUTION INTRAMUSCULAR; INTRAVENOUS at 03:26

## 2019-02-24 RX ADMIN — FUROSEMIDE 20 MG: 20 TABLET ORAL at 10:18

## 2019-02-24 RX ADMIN — CEFTRIAXONE 1 G: 1 INJECTION, POWDER, FOR SOLUTION INTRAMUSCULAR; INTRAVENOUS at 12:26

## 2019-02-24 RX ADMIN — ENOXAPARIN SODIUM 40 MG: 40 INJECTION SUBCUTANEOUS at 10:19

## 2019-02-24 RX ADMIN — INSULIN LISPRO 25 UNITS: 100 INJECTION, SOLUTION INTRAVENOUS; SUBCUTANEOUS at 10:27

## 2019-02-24 RX ADMIN — INSULIN LISPRO 25 UNITS: 100 INJECTION, SOLUTION INTRAVENOUS; SUBCUTANEOUS at 12:25

## 2019-02-24 RX ADMIN — SODIUM CHLORIDE, PRESERVATIVE FREE 10 ML: 5 INJECTION INTRAVENOUS at 10:19

## 2019-02-24 RX ADMIN — METHYLPREDNISOLONE SODIUM SUCCINATE 40 MG: 40 INJECTION, POWDER, LYOPHILIZED, FOR SOLUTION INTRAMUSCULAR; INTRAVENOUS at 21:57

## 2019-02-24 RX ADMIN — IPRATROPIUM BROMIDE AND ALBUTEROL SULFATE 1 AMPULE: .5; 3 SOLUTION RESPIRATORY (INHALATION) at 19:49

## 2019-02-24 RX ADMIN — IPRATROPIUM BROMIDE AND ALBUTEROL SULFATE 1 AMPULE: .5; 3 SOLUTION RESPIRATORY (INHALATION) at 16:22

## 2019-02-24 RX ADMIN — PREGABALIN 75 MG: 75 CAPSULE ORAL at 10:18

## 2019-02-24 RX ADMIN — PRAVASTATIN SODIUM 20 MG: 20 TABLET ORAL at 21:48

## 2019-02-24 RX ADMIN — INSULIN LISPRO 25 UNITS: 100 INJECTION, SOLUTION INTRAVENOUS; SUBCUTANEOUS at 21:40

## 2019-02-24 RX ADMIN — INSULIN LISPRO 25 UNITS: 100 INJECTION, SOLUTION INTRAVENOUS; SUBCUTANEOUS at 12:26

## 2019-02-24 RX ADMIN — LATANOPROST 1 DROP: 50 SOLUTION OPHTHALMIC at 21:41

## 2019-02-24 RX ADMIN — INSULIN GLARGINE 50 UNITS: 100 INJECTION, SOLUTION SUBCUTANEOUS at 21:41

## 2019-02-24 RX ADMIN — LINAGLIPTIN 5 MG: 5 TABLET, FILM COATED ORAL at 10:18

## 2019-02-24 RX ADMIN — GUAIFENESIN 600 MG: 600 TABLET, EXTENDED RELEASE ORAL at 10:18

## 2019-02-24 RX ADMIN — THEOPHYLLINE ANHYDROUS 300 MG: 300 CAPSULE, EXTENDED RELEASE ORAL at 10:18

## 2019-02-24 RX ADMIN — AZITHROMYCIN MONOHYDRATE 500 MG: 500 INJECTION, POWDER, LYOPHILIZED, FOR SOLUTION INTRAVENOUS at 03:25

## 2019-02-24 RX ADMIN — INSULIN LISPRO 25 UNITS: 100 INJECTION, SOLUTION INTRAVENOUS; SUBCUTANEOUS at 16:39

## 2019-02-24 RX ADMIN — LISINOPRIL 40 MG: 40 TABLET ORAL at 10:18

## 2019-02-24 RX ADMIN — PANTOPRAZOLE SODIUM 40 MG: 40 TABLET, DELAYED RELEASE ORAL at 05:50

## 2019-02-24 RX ADMIN — METHYLPREDNISOLONE SODIUM SUCCINATE 40 MG: 40 INJECTION, POWDER, LYOPHILIZED, FOR SOLUTION INTRAMUSCULAR; INTRAVENOUS at 16:44

## 2019-02-24 RX ADMIN — INSULIN LISPRO 15 UNITS: 100 INJECTION, SOLUTION INTRAVENOUS; SUBCUTANEOUS at 04:00

## 2019-02-24 RX ADMIN — INSULIN LISPRO 20 UNITS: 100 INJECTION, SOLUTION INTRAVENOUS; SUBCUTANEOUS at 16:40

## 2019-02-25 LAB
GLUCOSE BLD-MCNC: 180 MG/DL (ref 70–99)
GLUCOSE BLD-MCNC: 201 MG/DL (ref 70–99)
GLUCOSE BLD-MCNC: 223 MG/DL (ref 70–99)
GLUCOSE BLD-MCNC: 247 MG/DL (ref 70–99)
GLUCOSE BLD-MCNC: 264 MG/DL (ref 70–99)

## 2019-02-25 PROCEDURE — 82962 GLUCOSE BLOOD TEST: CPT

## 2019-02-25 PROCEDURE — 6370000000 HC RX 637 (ALT 250 FOR IP): Performed by: HOSPITALIST

## 2019-02-25 PROCEDURE — 99232 SBSQ HOSP IP/OBS MODERATE 35: CPT | Performed by: INTERNAL MEDICINE

## 2019-02-25 PROCEDURE — 2140000000 HC CCU INTERMEDIATE R&B

## 2019-02-25 PROCEDURE — 6360000002 HC RX W HCPCS: Performed by: INTERNAL MEDICINE

## 2019-02-25 PROCEDURE — 2580000003 HC RX 258: Performed by: HOSPITALIST

## 2019-02-25 PROCEDURE — 6370000000 HC RX 637 (ALT 250 FOR IP): Performed by: INTERNAL MEDICINE

## 2019-02-25 PROCEDURE — 6360000002 HC RX W HCPCS: Performed by: HOSPITALIST

## 2019-02-25 PROCEDURE — 94640 AIRWAY INHALATION TREATMENT: CPT

## 2019-02-25 PROCEDURE — 2700000000 HC OXYGEN THERAPY PER DAY

## 2019-02-25 PROCEDURE — 94660 CPAP INITIATION&MGMT: CPT

## 2019-02-25 PROCEDURE — 2580000003 HC RX 258: Performed by: INTERNAL MEDICINE

## 2019-02-25 RX ORDER — GUAIFENESIN AND PSEUDOEPHEDRINE HYDROCHLORIDE 600; 60 MG/1; MG/1
TABLET, EXTENDED RELEASE ORAL
Refills: 0 | Status: ON HOLD | COMMUNITY
Start: 2019-02-21 | End: 2019-02-26 | Stop reason: HOSPADM

## 2019-02-25 RX ORDER — DIVALPROEX SODIUM 250 MG/1
TABLET, DELAYED RELEASE ORAL
Refills: 0 | Status: ON HOLD | COMMUNITY
Start: 2019-02-21 | End: 2022-01-12 | Stop reason: HOSPADM

## 2019-02-25 RX ORDER — ALBUTEROL SULFATE 2.5 MG/3ML
SOLUTION RESPIRATORY (INHALATION)
Refills: 0 | COMMUNITY
Start: 2019-02-21

## 2019-02-25 RX ADMIN — PREDNISONE 40 MG: 20 TABLET ORAL at 08:30

## 2019-02-25 RX ADMIN — AZITHROMYCIN MONOHYDRATE 500 MG: 500 INJECTION, POWDER, LYOPHILIZED, FOR SOLUTION INTRAVENOUS at 03:35

## 2019-02-25 RX ADMIN — INSULIN LISPRO 10 UNITS: 100 INJECTION, SOLUTION INTRAVENOUS; SUBCUTANEOUS at 11:43

## 2019-02-25 RX ADMIN — CEFTRIAXONE 1 G: 1 INJECTION, POWDER, FOR SOLUTION INTRAMUSCULAR; INTRAVENOUS at 12:56

## 2019-02-25 RX ADMIN — PANTOPRAZOLE SODIUM 40 MG: 40 TABLET, DELAYED RELEASE ORAL at 06:16

## 2019-02-25 RX ADMIN — IPRATROPIUM BROMIDE AND ALBUTEROL SULFATE 1 AMPULE: .5; 3 SOLUTION RESPIRATORY (INHALATION) at 12:02

## 2019-02-25 RX ADMIN — INSULIN LISPRO 25 UNITS: 100 INJECTION, SOLUTION INTRAVENOUS; SUBCUTANEOUS at 11:43

## 2019-02-25 RX ADMIN — THEOPHYLLINE ANHYDROUS 300 MG: 300 CAPSULE, EXTENDED RELEASE ORAL at 08:29

## 2019-02-25 RX ADMIN — LATANOPROST 1 DROP: 50 SOLUTION OPHTHALMIC at 22:55

## 2019-02-25 RX ADMIN — METFORMIN HYDROCHLORIDE 850 MG: 850 TABLET ORAL at 08:29

## 2019-02-25 RX ADMIN — PRAVASTATIN SODIUM 20 MG: 20 TABLET ORAL at 22:56

## 2019-02-25 RX ADMIN — INSULIN LISPRO 5 UNITS: 100 INJECTION, SOLUTION INTRAVENOUS; SUBCUTANEOUS at 22:55

## 2019-02-25 RX ADMIN — INSULIN LISPRO 10 UNITS: 100 INJECTION, SOLUTION INTRAVENOUS; SUBCUTANEOUS at 16:33

## 2019-02-25 RX ADMIN — LINAGLIPTIN 5 MG: 5 TABLET, FILM COATED ORAL at 08:29

## 2019-02-25 RX ADMIN — THEOPHYLLINE ANHYDROUS 300 MG: 300 CAPSULE, EXTENDED RELEASE ORAL at 22:56

## 2019-02-25 RX ADMIN — GUAIFENESIN 600 MG: 600 TABLET, EXTENDED RELEASE ORAL at 08:29

## 2019-02-25 RX ADMIN — AMLODIPINE BESYLATE 10 MG: 10 TABLET ORAL at 08:30

## 2019-02-25 RX ADMIN — FUROSEMIDE 20 MG: 20 TABLET ORAL at 08:30

## 2019-02-25 RX ADMIN — INSULIN LISPRO 25 UNITS: 100 INJECTION, SOLUTION INTRAVENOUS; SUBCUTANEOUS at 10:08

## 2019-02-25 RX ADMIN — ENOXAPARIN SODIUM 40 MG: 40 INJECTION SUBCUTANEOUS at 08:29

## 2019-02-25 RX ADMIN — INSULIN LISPRO 25 UNITS: 100 INJECTION, SOLUTION INTRAVENOUS; SUBCUTANEOUS at 16:35

## 2019-02-25 RX ADMIN — DULOXETINE HYDROCHLORIDE 60 MG: 30 CAPSULE, DELAYED RELEASE ORAL at 08:30

## 2019-02-25 RX ADMIN — IPRATROPIUM BROMIDE AND ALBUTEROL SULFATE 1 AMPULE: .5; 3 SOLUTION RESPIRATORY (INHALATION) at 16:03

## 2019-02-25 RX ADMIN — INSULIN GLARGINE 50 UNITS: 100 INJECTION, SOLUTION SUBCUTANEOUS at 22:54

## 2019-02-25 RX ADMIN — INSULIN LISPRO 15 UNITS: 100 INJECTION, SOLUTION INTRAVENOUS; SUBCUTANEOUS at 03:31

## 2019-02-25 RX ADMIN — IPRATROPIUM BROMIDE AND ALBUTEROL SULFATE 1 AMPULE: .5; 3 SOLUTION RESPIRATORY (INHALATION) at 08:15

## 2019-02-25 RX ADMIN — SODIUM CHLORIDE, PRESERVATIVE FREE 10 ML: 5 INJECTION INTRAVENOUS at 22:56

## 2019-02-25 RX ADMIN — PREGABALIN 75 MG: 75 CAPSULE ORAL at 08:30

## 2019-02-25 RX ADMIN — METFORMIN HYDROCHLORIDE 850 MG: 850 TABLET ORAL at 16:33

## 2019-02-25 RX ADMIN — IPRATROPIUM BROMIDE AND ALBUTEROL SULFATE 1 AMPULE: .5; 3 SOLUTION RESPIRATORY (INHALATION) at 21:16

## 2019-02-25 RX ADMIN — SODIUM CHLORIDE, PRESERVATIVE FREE 10 ML: 5 INJECTION INTRAVENOUS at 08:29

## 2019-02-25 RX ADMIN — GUAIFENESIN 600 MG: 600 TABLET, EXTENDED RELEASE ORAL at 22:55

## 2019-02-25 RX ADMIN — INSULIN LISPRO 10 UNITS: 100 INJECTION, SOLUTION INTRAVENOUS; SUBCUTANEOUS at 10:01

## 2019-02-25 ASSESSMENT — PAIN SCALES - GENERAL: PAINLEVEL_OUTOF10: 0

## 2019-02-26 VITALS
RESPIRATION RATE: 18 BRPM | BODY MASS INDEX: 44.41 KG/M2 | WEIGHT: 293 LBS | OXYGEN SATURATION: 96 % | HEIGHT: 68 IN | DIASTOLIC BLOOD PRESSURE: 73 MMHG | TEMPERATURE: 98.2 F | HEART RATE: 86 BPM | SYSTOLIC BLOOD PRESSURE: 134 MMHG

## 2019-02-26 LAB
ANION GAP SERPL CALCULATED.3IONS-SCNC: 11 MMOL/L (ref 4–16)
BUN BLDV-MCNC: 24 MG/DL (ref 6–23)
CALCIUM SERPL-MCNC: 10.1 MG/DL (ref 8.3–10.6)
CHLORIDE BLD-SCNC: 101 MMOL/L (ref 99–110)
CO2: 30 MMOL/L (ref 21–32)
CREAT SERPL-MCNC: 1.1 MG/DL (ref 0.6–1.1)
GFR AFRICAN AMERICAN: >60 ML/MIN/1.73M2
GFR NON-AFRICAN AMERICAN: 51 ML/MIN/1.73M2
GLUCOSE BLD-MCNC: 151 MG/DL (ref 70–99)
GLUCOSE BLD-MCNC: 189 MG/DL (ref 70–99)
GLUCOSE BLD-MCNC: 196 MG/DL (ref 70–99)
GLUCOSE BLD-MCNC: 202 MG/DL (ref 70–99)
GLUCOSE BLD-MCNC: 267 MG/DL (ref 70–99)
HCT VFR BLD CALC: 34.8 % (ref 37–47)
HEMOGLOBIN: 10.1 GM/DL (ref 12.5–16)
MAGNESIUM: 1.7 MG/DL (ref 1.8–2.4)
MCH RBC QN AUTO: 25.1 PG (ref 27–31)
MCHC RBC AUTO-ENTMCNC: 29 % (ref 32–36)
MCV RBC AUTO: 86.4 FL (ref 78–100)
PDW BLD-RTO: 15.9 % (ref 11.7–14.9)
PLATELET # BLD: 209 K/CU MM (ref 140–440)
PMV BLD AUTO: 11.1 FL (ref 7.5–11.1)
POTASSIUM SERPL-SCNC: 3.8 MMOL/L (ref 3.5–5.1)
RBC # BLD: 4.03 M/CU MM (ref 4.2–5.4)
REASON FOR REJECTION: NORMAL
REJECTED TEST: NORMAL
SODIUM BLD-SCNC: 142 MMOL/L (ref 135–145)
SOURCE: NORMAL
WBC # BLD: 7.3 K/CU MM (ref 4–10.5)

## 2019-02-26 PROCEDURE — 6370000000 HC RX 637 (ALT 250 FOR IP): Performed by: HOSPITALIST

## 2019-02-26 PROCEDURE — 94660 CPAP INITIATION&MGMT: CPT

## 2019-02-26 PROCEDURE — 2580000003 HC RX 258: Performed by: HOSPITALIST

## 2019-02-26 PROCEDURE — 80048 BASIC METABOLIC PNL TOTAL CA: CPT

## 2019-02-26 PROCEDURE — 99232 SBSQ HOSP IP/OBS MODERATE 35: CPT | Performed by: INTERNAL MEDICINE

## 2019-02-26 PROCEDURE — 2580000003 HC RX 258: Performed by: INTERNAL MEDICINE

## 2019-02-26 PROCEDURE — 6370000000 HC RX 637 (ALT 250 FOR IP): Performed by: INTERNAL MEDICINE

## 2019-02-26 PROCEDURE — 83735 ASSAY OF MAGNESIUM: CPT

## 2019-02-26 PROCEDURE — 36415 COLL VENOUS BLD VENIPUNCTURE: CPT

## 2019-02-26 PROCEDURE — 94761 N-INVAS EAR/PLS OXIMETRY MLT: CPT

## 2019-02-26 PROCEDURE — 6360000002 HC RX W HCPCS: Performed by: INTERNAL MEDICINE

## 2019-02-26 PROCEDURE — 85027 COMPLETE CBC AUTOMATED: CPT

## 2019-02-26 PROCEDURE — 6360000002 HC RX W HCPCS: Performed by: HOSPITALIST

## 2019-02-26 PROCEDURE — 94640 AIRWAY INHALATION TREATMENT: CPT

## 2019-02-26 PROCEDURE — 82962 GLUCOSE BLOOD TEST: CPT

## 2019-02-26 RX ORDER — AZITHROMYCIN 500 MG/1
500 TABLET, FILM COATED ORAL DAILY
Qty: 3 TABLET | Refills: 0 | Status: SHIPPED | OUTPATIENT
Start: 2019-02-26 | End: 2019-03-01

## 2019-02-26 RX ORDER — GUAIFENESIN 600 MG/1
600 TABLET, EXTENDED RELEASE ORAL 2 TIMES DAILY
Qty: 20 TABLET | Refills: 0 | Status: SHIPPED | OUTPATIENT
Start: 2019-02-26

## 2019-02-26 RX ORDER — METHYLPREDNISOLONE 4 MG/1
TABLET ORAL
Qty: 1 KIT | Refills: 0 | Status: SHIPPED | OUTPATIENT
Start: 2019-02-26 | End: 2019-03-04

## 2019-02-26 RX ADMIN — INSULIN LISPRO 10 UNITS: 100 INJECTION, SOLUTION INTRAVENOUS; SUBCUTANEOUS at 11:57

## 2019-02-26 RX ADMIN — INSULIN LISPRO 25 UNITS: 100 INJECTION, SOLUTION INTRAVENOUS; SUBCUTANEOUS at 11:56

## 2019-02-26 RX ADMIN — GUAIFENESIN 600 MG: 600 TABLET, EXTENDED RELEASE ORAL at 09:28

## 2019-02-26 RX ADMIN — ENOXAPARIN SODIUM 40 MG: 40 INJECTION SUBCUTANEOUS at 09:28

## 2019-02-26 RX ADMIN — CEFTRIAXONE 1 G: 1 INJECTION, POWDER, FOR SOLUTION INTRAMUSCULAR; INTRAVENOUS at 11:59

## 2019-02-26 RX ADMIN — PANTOPRAZOLE SODIUM 40 MG: 40 TABLET, DELAYED RELEASE ORAL at 06:40

## 2019-02-26 RX ADMIN — IPRATROPIUM BROMIDE AND ALBUTEROL SULFATE 1 AMPULE: .5; 3 SOLUTION RESPIRATORY (INHALATION) at 11:27

## 2019-02-26 RX ADMIN — FUROSEMIDE 20 MG: 20 TABLET ORAL at 09:28

## 2019-02-26 RX ADMIN — LINAGLIPTIN 5 MG: 5 TABLET, FILM COATED ORAL at 09:27

## 2019-02-26 RX ADMIN — INSULIN LISPRO 5 UNITS: 100 INJECTION, SOLUTION INTRAVENOUS; SUBCUTANEOUS at 02:23

## 2019-02-26 RX ADMIN — THEOPHYLLINE ANHYDROUS 300 MG: 300 CAPSULE, EXTENDED RELEASE ORAL at 09:28

## 2019-02-26 RX ADMIN — IPRATROPIUM BROMIDE AND ALBUTEROL SULFATE 1 AMPULE: .5; 3 SOLUTION RESPIRATORY (INHALATION) at 08:02

## 2019-02-26 RX ADMIN — PREDNISONE 40 MG: 20 TABLET ORAL at 09:27

## 2019-02-26 RX ADMIN — INSULIN LISPRO 5 UNITS: 100 INJECTION, SOLUTION INTRAVENOUS; SUBCUTANEOUS at 09:30

## 2019-02-26 RX ADMIN — AZITHROMYCIN MONOHYDRATE 500 MG: 500 INJECTION, POWDER, LYOPHILIZED, FOR SOLUTION INTRAVENOUS at 02:23

## 2019-02-26 RX ADMIN — INSULIN LISPRO 25 UNITS: 100 INJECTION, SOLUTION INTRAVENOUS; SUBCUTANEOUS at 09:28

## 2019-02-26 RX ADMIN — PREGABALIN 75 MG: 75 CAPSULE ORAL at 09:28

## 2019-02-26 RX ADMIN — METFORMIN HYDROCHLORIDE 850 MG: 850 TABLET ORAL at 09:28

## 2019-02-26 RX ADMIN — AMLODIPINE BESYLATE 10 MG: 10 TABLET ORAL at 09:27

## 2019-02-26 RX ADMIN — DULOXETINE HYDROCHLORIDE 60 MG: 30 CAPSULE, DELAYED RELEASE ORAL at 09:27

## 2019-02-26 ASSESSMENT — PAIN SCALES - GENERAL: PAINLEVEL_OUTOF10: 0

## 2019-02-27 LAB
EKG ATRIAL RATE: 96 BPM
EKG DIAGNOSIS: NORMAL
EKG P AXIS: 47 DEGREES
EKG P-R INTERVAL: 146 MS
EKG Q-T INTERVAL: 338 MS
EKG QRS DURATION: 80 MS
EKG QTC CALCULATION (BAZETT): 427 MS
EKG R AXIS: -7 DEGREES
EKG T AXIS: 97 DEGREES
EKG VENTRICULAR RATE: 96 BPM

## 2019-03-30 ENCOUNTER — HOSPITAL ENCOUNTER (EMERGENCY)
Age: 59
Discharge: HOME | End: 2019-03-30
Payer: MEDICAID

## 2019-03-30 VITALS
BODY MASS INDEX: 44.41 KG/M2 | HEIGHT: 68 IN | DIASTOLIC BLOOD PRESSURE: 61 MMHG | HEART RATE: 64 BPM | OXYGEN SATURATION: 96 % | SYSTOLIC BLOOD PRESSURE: 124 MMHG | TEMPERATURE: 98.2 F | WEIGHT: 293 LBS | RESPIRATION RATE: 17 BRPM

## 2019-03-30 DIAGNOSIS — S81.812A LACERATION OF LEFT LOWER EXTREMITY, INITIAL ENCOUNTER: Primary | ICD-10-CM

## 2019-03-30 PROCEDURE — 4500000027

## 2019-03-30 PROCEDURE — 99282 EMERGENCY DEPT VISIT SF MDM: CPT

## 2019-03-30 PROCEDURE — 2500000003 HC RX 250 WO HCPCS: Performed by: PHYSICIAN ASSISTANT

## 2019-03-30 RX ORDER — LIDOCAINE HYDROCHLORIDE 20 MG/ML
5 INJECTION, SOLUTION EPIDURAL; INFILTRATION; INTRACAUDAL; PERINEURAL ONCE
Status: COMPLETED | OUTPATIENT
Start: 2019-03-30 | End: 2019-03-30

## 2019-03-30 RX ORDER — AMOXICILLIN AND CLAVULANATE POTASSIUM 875; 125 MG/1; MG/1
1 TABLET, FILM COATED ORAL 2 TIMES DAILY
Qty: 10 TABLET | Refills: 0 | Status: SHIPPED | OUTPATIENT
Start: 2019-03-30 | End: 2019-04-04

## 2019-03-30 RX ADMIN — LIDOCAINE HYDROCHLORIDE 5 ML: 20 INJECTION, SOLUTION EPIDURAL; INFILTRATION; INTRACAUDAL at 16:45

## 2019-03-30 ASSESSMENT — PAIN SCALES - GENERAL: PAINLEVEL_OUTOF10: 2

## 2019-03-30 NOTE — ED PROVIDER NOTES
eMERGENCY dEPARTMENT eNCOUnter        PCP: Laverne Larose MD    CHIEF COMPLAINT    Chief Complaint   Patient presents with    Laceration     to the back of the R leg       HPI    Nitin Narayanan is a 62 y.o. female who presents with laceration to posterior left lower leg. Onset prior to arrival.  Context is patient accidentally contacted posterior left leg against sharp metal edge of wheelchair noting immediate bleeding and pain. Pain has improved and bleeding has stopped. Pain does not radiate. No known aggravating or alleviating factors. No distal numbness, tingling, weakness, functional/motor deficit. Pt denies foreign body sensation. Up to date Tetanus Status        REVIEW OF SYSTEMS    General:   Denies symptoms preceding injury. Skin: + Laceration. SEE HPI  Musculoskeletal:  No distal numbness, tingling. No obvious tendon or motor deficits. Denies any other musculoskeletal injuries or skin trauma. All other review of systems are negative  See HPI and nursing notes for additional information     PAST MEDICAL & SURGICAL HISTORY    Past Medical History:   Diagnosis Date    Arthritis     Asthma     [facility] [address]     COPD (chronic obstructive pulmonary disease) ([facility] [address]     Depression     Diabetes mellitus ([facility] [address]     Edema     Glaucoma     History of Doppler ultrasound 03/26/2018    Arterial-bilateral VERONICA's show normal arterial flow. No signif occlusive arterial disease.     History of nuclear stress test 02/22/2018    cardiolite-EF45%,normal    Hx of Doppler echocardiogram 02/22/2018    EF50-60%,no valvular disease    Hyperlipidemia     Hypertension     IBS (irritable bowel syndrome)      Past Surgical History:   Procedure Laterality Date    TUBAL LIGATION         CURRENT MEDICATIONS    Current Outpatient Rx   Medication Sig Dispense Refill    amoxicillin-clavulanate (AUGMENTIN) 875-125 MG per tablet Take 1 tablet by mouth 2 times daily for 5 days 10 tablet 0    injection Inject 50 Units into the skin nightly          ALLERGIES    Allergies   Allergen Reactions    Codeine     Adhesive Tape Itching    Toradol [Ketorolac Tromethamine] Other (See Comments)     Headache for 5-6 days       SOCIAL & FAMILY HISTORY    Social History     Socioeconomic History    Marital status: Legally      Spouse name: None    Number of children: None    Years of education: None    Highest education level: None   Occupational History    None   Social Needs    Financial resource strain: None    Food insecurity:     Worry: None     Inability: None    Transportation needs:     Medical: None     Non-medical: None   Tobacco Use    Smoking status: Former Smoker    Smokeless tobacco: Never Used   Substance and Sexual Activity    Alcohol use: No    Drug use: No    Sexual activity: None   Lifestyle    Physical activity:     Days per week: None     Minutes per session: None    Stress: None   Relationships    Social connections:     Talks on phone: None     Gets together: None     Attends Jewish service: None     Active member of club or organization: None     Attends meetings of clubs or organizations: None     Relationship status: None    Intimate partner violence:     Fear of current or ex partner: None     Emotionally abused: None     Physically abused: None     Forced sexual activity: None   Other Topics Concern    None   Social History Narrative    None     Family History   Problem Relation Age of Onset    Cancer Mother     Diabetes Mother     Heart Disease Mother     Cancer Father     High Blood Pressure Father     Cancer Other     Cancer Other     Diabetes Sister     Heart Disease Sister     High Blood Pressure Brother     Depression Sister            PHYSICAL EXAM    VITAL SIGNS: /61   Pulse 64   Temp 98.2 °F (36.8 °C) (Oral)   Resp 17   Ht 5' 8\" (1.727 m)   Wt (!) 316 lb (143.3 kg)   SpO2 96%   BMI 48.05 kg/m²      Constitutional:  Well developed, Appears comfortable  HEENT:  Normocephalic, Atraumatic. Musculoskeletal:  No gross deformities. No motor deficits. Distal sensation and capillary refill intact. Vascular: Distal pulses and capillary refill intact. Integument:    Over the posterior left leg there is a laceration measuring approximately 0.6 centimeters in length    No obvious foreign body on initial inspection. No obvious tendon involvement    Distal sensation, capillary refill intact. Distal joints with full range of motion    See below for further details. Neurologic:  Awake and alert, normal flow of speech. CN 2-12 intact. Psychiatric: Cooperative, pleasant affect        ________________________________________________________________________       Procedure Note - Arina Pedro PA-C      Laceration Repair Procedure Note    Indication: Skin Laceration - LEFT leg    Procedure:   - Procedure explained, including risks and benefits explained to the patient who expressed understanding. All questions were answered. Verbal consent obtained. - The Wound was prepped and draped in the usual sterile fashion using Betadine and sterile saline.  - Wound was explored to it's depth:    no foreign bodies. no compromise of neurovascular or tendon structures  - Wound was irrigated with copious amounts of sterile saline and mechanically debrided utilizing sterile gauze. - The laceration was Closed with  wound adhesive  - Hemostasis and good cosmesis was achieved. Blood loss minimal.  - The wound area was then dressed with Sterile nonstick dressing, sterile gauze, and tape. - Patient tolerated procedure well without complications.     Post procedure exam of the affected region reveals distal sensation, motor, capillary refill, and pulses intact    Total repaired wound length: 0.6cm  ________________________________________________________________________          ED COURSE & MEDICAL DECISION MAKING        I

## 2019-03-30 NOTE — ED NOTES
Discharge instructions reviewed with patient. PT verbalizes understanding. All questions answered. Follow up instructions given. PT denies any further needs at this time.       Bhupinder Em, [place]  59/25/71 2094

## 2019-04-23 ENCOUNTER — HOSPITAL ENCOUNTER (OUTPATIENT)
Dept: PHYSICAL THERAPY | Age: 59
Setting detail: THERAPIES SERIES
Discharge: HOME | End: 2019-04-23
Payer: MEDICAID

## 2019-04-23 PROCEDURE — 97162 PT EVAL MOD COMPLEX 30 MIN: CPT

## 2019-04-23 PROCEDURE — 97110 THERAPEUTIC EXERCISES: CPT

## 2019-04-23 ASSESSMENT — PAIN SCALES - GENERAL: PAINLEVEL_OUTOF10: 7

## 2019-04-23 ASSESSMENT — PAIN DESCRIPTION - DESCRIPTORS: DESCRIPTORS: ACHING

## 2019-04-23 ASSESSMENT — PAIN DESCRIPTION - PAIN TYPE: TYPE: CHRONIC PAIN

## 2019-04-23 ASSESSMENT — PAIN DESCRIPTION - ORIENTATION: ORIENTATION: RIGHT;LEFT

## 2019-04-23 ASSESSMENT — PAIN DESCRIPTION - FREQUENCY: FREQUENCY: CONTINUOUS

## 2019-04-23 ASSESSMENT — PAIN DESCRIPTION - PROGRESSION: CLINICAL_PROGRESSION: NOT CHANGED

## 2019-04-23 NOTE — PROGRESS NOTES
Outpatient Physical Therapy        [x] Phone: [phone]   Fax: [phone]   Pediatric Therapy          [] Phone: [phone]   Fax: [phone]  Pediatric [facility]          [] Phone: [phone]   Fax: [phone]      To: Referring Practitioner: Dr Rocky Burleson MD    From: Katja Carrera. Vidal, PT     Patient: Elio Fox       : 1960  Diagnosis: back and leg pain   Treatment Diagnosis: chronic mult-jt pain with poor strength and endurance   Date: 2019    Physical Therapy Certification/Re-Certification Form  Dear Dr. Merrill Paz,   The following patient has been evaluated for physical therapy services and for therapy to continue, Please review the attached evaluation and/or summary of the patient's plan of care, and verify that you agree therapy should continue by signing the attached document and sending it back to our office. Assessment:  Pt is a 62 yo female who presents with chronic back, leg and arm pain which impacts on all ADL's and functional activity level;patient's goal is relief of pain to improve her walking distance and to enjoy community activities ;patient reports that back, leg and arm pain  limits activities including dressing, bathing, sitting, standing, ambulating and sleep; PT to address patient's goals, impairments and activity limitations with skilled interventions checked in plan of care;patient's level of function has become progressively more limited over the past 2-3 years; did not observe any barriers to learning during PT eval; learning preferences include demonstration, practice, and handouts; patient expressed understanding of HEP; patient appears to be motivated to participate in an active PT program and to be compliant with HEP expectations;patient assisted in developing treatment plan and goals; pt ambulates with SPC.           Planned Services:  [] Therapeutic Exercise    [x] Aquatics:  [] Therapeutic Activity    [] Ultrasound  [] Elec Stimulation  [] Gait Training     [] Cervical Traction [] Lumbar Traction  [] Neuromuscular Re-education [] Cold/hotpack [] Iontophoresis   [] Instruction in HEP       [] Manual Therapy     [] vasopneumatic            [] Self care home management        []Dry needling trigger point point/pain management      Plan of Care Date Range:  4/23/19 - 5/31/19     ? Frequency/Duration:  # Days per week: [] 1 day # Weeks: [] 1 week [x] 5 weeks     [x] 2 days? [] 2 weeks [] 6 weeks     [] 3 days   [] 3 weeks [] 7 weeks     [] 4 days   [] 4 weeks [] 8 weeks    Rehab Potential: [] Excellent [] Good [x] Fair  [] Poor     Goals:  Short term goals  Time Frame for Short term goals: 10 Rxs  Short term goal 1: Pt will be compliant with attendance of pool appointments  Short term goal 2: Pt will report 20 - 30% improvement in her pain during ambulation  Short term goal 3: Oswestry score < 25/50 indicating functional improvement       Electronically signed by:  Margy Berg. Vidal, PT, 4/23/2019, 2:58 PM          If you have any questions or concerns, please don't hesitate to call.   Thank you for your referral.    Physician Signature:_________________Date:____________Time: ________  By signing above, therapists plan is approved by physician

## 2019-04-23 NOTE — FLOWSHEET NOTE
Outpatient Physical Therapy  [place]           [x] Phone: [phone]   Fax: [phone]  [facility]           [] Phone: [phone]   Fax: [phone]        Physical Therapy Daily Treatment Note  Date:  2019    Patient Name:  Rocio Chapa    :  1960  MRN: [id number]  Restrictions/Precautions:    Diagnosis:   Diagnosis: back, leg and arm pain  Date of Injury/Surgery:   Treatment Diagnosis:  chronic mult-jt pain with poor strength and endurance    Insurance/Certification information: Medicaid   Referring Physician: Dr Clarke Frias MD  Next Doctor Visit:  19  Plan of care signed (Y/N): Pending   Outcome Measure: Mod Oswestry  34/50;  68%  Visit# / total visits:   1/10  Pain level: 7/10   Goals:       Short term goals  Time Frame for Short term goals: 10 Rxs  Short term goal 1: Pt will be compliant with attendance of pool appointments  Short term goal 2: Pt will report 20 - 30% improvement in her pain during ambulation  Short term goal 3: Oswestry score < 25/50 indicating functional improvement       Summary of Evaluation: Pt reporting chronic back, B leg, B shoulder, B wrist and R jaw pain. Pt reporting initially a back injury 20 years ago, lifting injury, and she states her back has never gotten any better. States previous PT, injections, and chiropractic without improvement. Sees Dr Jomar López for pain management. She reports her family Dr told her she has neuropathies in her legs and in her R arm. Reports sedentary lifestyle. Reports pain limits sitting to < 30', standing < 10', ambulating to very short distances with SPC, and sleep to < 4 hours. Reports she use [place] but hasn't been able to for 2-3 because of the pain. Subjective:  See eval         Any changes in Ambulatory Summary Sheet?   None        Objective:  See eval           Exercises: (No more than 4 columns)   Exercise/Equipment Date  19         WARM UP               Sitting     B scap retraction  2x5/2\"   TrA 2x5/3\"   Marching  1x10 L/R   LAQ's 1x10 L/R          STANDING                                     PROPRIOCEPTION                        MODALITIES                Other Therapeutic Activities/Education: Pt received education on their current pathology and how their condition affects functional activities. Pt understood discussion and questions were answered. Pt understands their activity limitations and understands rational for treatment progression. Home Exercise Program:  Written HEP as outlined above to patient. Will review as needed. Manual Treatments:        Modalities:        Communication with other providers:  POC to referring physician. Assessment: Pt is a 60 yo female who presents with chronic back, leg and arm pain which impacts on all ADL's and functional activity level;patient's goal is relief of pain to improve her walking distance and to enjoy community activities ;patient reports that back, leg and arm pain  limits activities including dressing, bathing, sitting, standing, ambulating and sleep; PT to address patient's goals, impairments and activity limitations with skilled interventions checked in plan of care;patient's level of function has become progressively more limited over the past 2-3 years; did not observe any barriers to learning during PT eval; learning preferences include demonstration, practice, and handouts; patient expressed understanding of HEP; patient appears to be motivated to participate in an active PT program and to be compliant with HEP expectations;patient assisted in developing treatment plan and goals; pt ambulates with SPC.              Plan for Next Session:  begin aquatic therapy      Time In / Time Out:  13:25-14:15         Timed Code/Total Treatment Minutes: 20'/50'   Eval  TE       Next Progress Note due:  10 Rxs      Plan of Care Interventions:  [x] Therapeutic Exercise  [] Modalities:  [] Therapeutic Activity     [] Ultrasound  [] Estim  [] Gait Training      [] Cervical Traction [] Lumbar Traction  [] Neuromuscular Re-education    [] Cold/hotpack [] Iontophoresis   [x] Instruction in HEP      [] Vasopneumatic   [] Dry Needling    [] Manual Therapy               [] Aquatic Therapy              Electronically signed by:  Curtis Drake,  PT    4/23/2019, 2:49 PM

## 2019-04-23 NOTE — PROGRESS NOTES
Physical Therapy  Initial Assessment  Date: 2019  Patient Name: Art Wilcox  MRN: [id number]  : 1960     Treatment Diagnosis: chronic mult-jt pain with poor strength and endurance    Restrictions       Subjective   General  Patient assessed for rehabilitation services?: Yes  Referring Practitioner: Dr Cony Ventura MD  Diagnosis: back and leg pain  Follows Commands: Within Functional Limits  General Comment  Comments: arthritis, COPD, depression, Bipolar,  HTN, Type2 DM  PT Visit Information  PT Insurance Information: Medicaid  Subjective  Subjective: Pt reporting chronic back, B leg, B shoulder, B wrist and R jaw pain. Pt reporting initially a back injury 20 years ago, lifting injury, and she states her back has never gotten any better. States previous PT, injections, and chiropractic without improvement. Sees Dr Jonathan Lainez for pain management. She reports her family Dr told her she has neuropathies in her legs and in her R arm. Reports sedentary lifestyle. Reports pain limits sitting to < 30', standing < 10', ambulating to very short distances with SPC, and sleep to < 4 hours. Reports she use to [place] going [place] to [place] but hasn't been able to for 2-3 because of the pain.    Pain Screening  Patient Currently in Pain: Yes  Pain Assessment  Pain Assessment: 0-10  Pain Level: 7(7/10 back, B legs 7/10, B shoulders 6/10, B wrists 7/10, R jaw 6/10)  Patient's Stated Pain Goal: 3  Pain Type: Chronic pain  Pain Location: Back;Hip;Jaw;Leg;Shoulder;Wrist  Pain Orientation: Right;Left  Pain Descriptors: Aching(deep hurt)  Pain Frequency: Continuous  Clinical Progression: Not changed  Vital Signs  Patient Currently in Pain: Yes    Vision/Hearing  Vision  Vision: Impaired(wears glasses all the time)  Hearing  Hearing: Exceptions to [facility]  Hearing Exceptions: Bilateral hearing aid(ordered but not approved by insurance as yet)    Orientation  Orientation  Overall Orientation Status: Within Normal AROM : Exceptions  L Shoulder Flexion 0-180: 90*  L Shoulder Extension 0-45: 10*  L Shoulder ABduction 0-180: 60*  L Shoulder Int Rotation  0-70: unable to reach into LB  L Shoulder Ext Rotation  0-90: able to reach to back of head, elbow < shoulder level    Strength RLE  Strength RLE: Exception  R Hip Flexion: 3-/5  R Hip Extension: 3-/5  R Hip ABduction: 3-/5  R Knee Flexion: 3/5  R Knee Extension: 3-/5  R Ankle Dorsiflexion: 3/5  R Ankle Plantar flexion: 3/5  Strength LLE  Strength LLE: Exception  L Hip Flexion: 3+/5  L Hip Extension: 3/5  L Hip ABduction: 3+/5  L Knee Flexion: 4-/5  L Knee Extension: 3/5  L Ankle Dorsiflexion: 3+/5  L Ankle Plantar Flexion: 4-/5  Tone RLE  RLE Tone: Normotonic  Tone LLE  LLE Tone: Normotonic  Motor Control  Gross Motor?: WNL  Additional Measures  Flexibility: moderately tight B hip flexors, hamstrings, heelcords  Special Tests: (-) SLR B  Sensation  Overall Sensation Status: Impaired  Light Touch: Partial deficits in the RUE;Partial deficits in the LUE;Partial deficits in the RLE;Partial deficits in the LLE     Transfers  Sit to Stand: Modified independent(needs arm of chair, cane and rocking motion to come to stand)  Stand to sit: Modified independent       Ambulation  Ambulation?: Yes  Ambulation 1  Surface: level tile  Device: Single point cane  Assistance: Modified Independent  Quality of Gait: antalgic with decreased step length and foot clearance B, decreased navid, forward posturing  Distance: fatigued w/ shortness of breath 35'  Balance  Sitting - Static: Good  Sitting - Dynamic: Good  Standing - Static: Fair  Standing - Dynamic: Fair                         Assessment   Conditions Requiring Skilled Therapeutic Intervention  Body structures, Functions, Activity limitations: Decreased functional mobility ; Decreased ROM; Decreased strength;Decreased endurance; Increased Pain  Assessment: Pt is a 60 yo female who presents with chronic back, leg and arm pain which impacts on all ADL's and functional activity level;patient's goal is relief of pain to improve her walking distance and to enjoy community activities ;patient reports that back, leg and arm pain  limits activities including dressing, bathing, sitting, standing, ambulating and sleep; PT to address patient's goals, impairments and activity limitations with skilled interventions checked in plan of care;patient's level of function has become progressively more limited over the past 2-3 years; did not observe any barriers to learning during PT eval; learning preferences include demonstration, practice, and handouts; patient expressed understanding of HEP; patient appears to be motivated to participate in an active PT program and to be compliant with HEP expectations;patient assisted in developing treatment plan and goals; pt ambulates with SPC. Current functional level (based on )   :  Treatment Diagnosis: chronic mult-jt pain with poor strength and endurance  Prognosis: Fair  Decision Making: Medium Complexity  Patient Education: Importance of compliance with appointments  Barriers to Learning: none noted  REQUIRES PT FOLLOW UP: Yes  Activity Tolerance  Activity Tolerance: Patient limited by pain; Patient limited by fatigue;Patient limited by endurance         Plan   Plan  Times per week: 2  Plan weeks: 5  Specific instructions for Next Treatment: begin aquatic therapy  Current Treatment Recommendations: Aquatics    G-Code       OutComes Score   Mod Oswestry 34/50, 68%                                                AM-PAC Score             Goals  Short term goals  Time Frame for Short term goals: 10 Rxs  Short term goal 1: Pt will be compliant with attendance of pool appointments  Short term goal 2: Pt will report 20 - 30% improvement in her pain during ambulation  Short term goal 3: Oswestry score < 25/50 indicating functional improvement  Patient Goals   Patient goals : relief of multi joint pain       Therapy Time Individual Concurrent Group Co-treatment   Time In 1325         Time Out 1415         Minutes 50         Timed Code Treatment Minutes: [address] [place].  [facility] PT

## 2019-05-07 ENCOUNTER — HOSPITAL ENCOUNTER (OUTPATIENT)
Dept: PHYSICAL THERAPY | Age: 59
Setting detail: THERAPIES SERIES
Discharge: HOME | End: 2019-05-07
Payer: MEDICAID

## 2019-05-07 PROCEDURE — 97113 AQUATIC THERAPY/EXERCISES: CPT

## 2019-05-10 ENCOUNTER — HOSPITAL ENCOUNTER (OUTPATIENT)
Dept: PHYSICAL THERAPY | Age: 59
Setting detail: THERAPIES SERIES
Discharge: HOME | End: 2019-05-10
Payer: MEDICAID

## 2019-05-14 ENCOUNTER — HOSPITAL ENCOUNTER (OUTPATIENT)
Dept: PHYSICAL THERAPY | Age: 59
Setting detail: THERAPIES SERIES
Discharge: HOME | End: 2019-05-14
Payer: MEDICAID

## 2019-05-14 PROCEDURE — 97113 AQUATIC THERAPY/EXERCISES: CPT

## 2019-05-14 NOTE — FLOWSHEET NOTE
Outpatient Physical Therapy  [place]           [x] Phone: [phone]   Fax: [phone]  [facility]           [] Phone: [phone]   Fax: [phone]        Physical Therapy Daily Treatment Note  Date:  2019    Patient Name:  Freddy Mercado    :  1960  MRN: [id number]  Restrictions/Precautions:    Diagnosis:   Diagnosis: back, leg and arm pain  Date of Injury/Surgery:   Treatment Diagnosis:  chronic mult-jt pain with poor strength and endurance    Insurance/Certification information: Medicaid   Referring Physician: Dr Randal Garcia MD  Next Doctor Visit:  19  Plan of care signed (Y/N): Pending   Outcome Measure: Mod Oswestry  34/50;  68%  Visit# / total visits:   4/10  Pain level: 7/10   Goals:       Short term goals  Time Frame for Short term goals: 10 Rxs  Short term goal 1: Pt will be compliant with attendance of pool appointments  Short term goal 2: Pt will report 20 - 30% improvement in her pain during ambulation  Short term goal 3: Oswestry score < 25/50 indicating functional improvement       Summary of Evaluation: Pt reporting chronic back, B leg, B shoulder, B wrist and R jaw pain. Pt reporting initially a back injury 20 years ago, lifting injury, and she states her back has never gotten any better. States previous PT, injections, and chiropractic without improvement. Sees Dr Marilin Reyna for pain management. She reports her family Dr told her she has neuropathies in her legs and in her R arm. Reports sedentary lifestyle. Reports pain limits sitting to < 30', standing < 10', ambulating to very short distances with SPC, and sleep to < 4 hours. Reports she use to [place] to [place] but hasn't been able to for 2-3 because of the pain. Subjective:  States she was so sore after last pool treatment        Any changes in Ambulatory Summary Sheet?   None        Objective:  See aquatic flow sheet   Pain 7/10  LB and LE's          Exercises: (No more than 4 columns) Exercise/Equipment Date  4/23/19 5/3/19 #2/10 5-5-19 2/10 5-14-19 # 4/10       pool pool pool   WARM UP  See aquatic therapy flow sheet for details See aquatic therapy flow sheet for details See aquatic therapy flow sheet for details                    Sitting        B scap retraction  2x5/2\"      TrA 2x5/3\"      Marching  1x10 L/R      LAQ's 1x10 L/R                STANDING                                                             PROPRIOCEPTION                                          MODALITIES                         Other Therapeutic Activities/Education: Pt received education on their current pathology and how their condition affects functional activities. Pt understood discussion and questions were answered. Pt understands their activity limitations and understands rational for treatment progression. Home Exercise Program:  Written HEP as outlined above to patient. Will review as needed. Manual Treatments:        Modalities:        Communication with other providers:  POC to referring physician. Assessment: Pain remained 5/10 after therapy.  It's just too much for me today to add any new exercises             Plan for Next Session:  begin aquatic therapy      Time In / Time Out:  2986-9191      Timed Code/Total Treatment Minutes: 28 aquatics      Next Progress Note due:  10 Rxs      Plan of Care Interventions:  [x] Therapeutic Exercise  [] Modalities:  [] Therapeutic Activity     [] Ultrasound  [] Estim  [] Gait Training      [] Cervical Traction [] Lumbar Traction  [] Neuromuscular Re-education    [] Cold/hotpack [] Iontophoresis   [x] Instruction in HEP      [] Vasopneumatic   [] Dry Needling    [] Manual Therapy               [x] Aquatic Therapy              Electronically signed by:  Constance Samano PTA    5/14/2019, 1:30 PM

## 2019-05-17 ENCOUNTER — HOSPITAL ENCOUNTER (OUTPATIENT)
Dept: PHYSICAL THERAPY | Age: 59
Setting detail: THERAPIES SERIES
Discharge: HOME | End: 2019-05-17
Payer: MEDICAID

## 2019-05-17 PROCEDURE — 97113 AQUATIC THERAPY/EXERCISES: CPT

## 2019-05-17 NOTE — FLOWSHEET NOTE
Outpatient Physical Therapy  [place]           [x] Phone: [phone]   Fax: [phone]  [place]           [] Phone: [phone]   Fax: [phone]        Physical Therapy Daily Treatment Note  Date:  2019    Patient Name:  Milagros Rosenbaum    :  1960  MRN: [id number]  Restrictions/Precautions:    Diagnosis:   Diagnosis: back, leg and arm pain  Date of Injury/Surgery:   Treatment Diagnosis:  chronic mult-jt pain with poor strength and endurance    Insurance/Certification information: Medicaid   Referring Physician: Dr Florencio Padilla MD  Next Doctor Visit:  19  Plan of care signed (Y/N): Pending   Outcome Measure: Mod Oswestry  34/50;  68%  Visit# / total visits:   5/10  Pain level: 7/10 LBP on R only today   Goals:       Short term goals  Time Frame for Short term goals: 10 Rxs  Short term goal 1: Pt will be compliant with attendance of pool appointments  Short term goal 2: Pt will report 20 - 30% improvement in her pain during ambulation  Short term goal 3: Oswestry score < 25/50 indicating functional improvement       Summary of Evaluation: Pt reporting chronic back, B leg, B shoulder, B wrist and R jaw pain. Pt reporting initially a back injury 20 years ago, lifting injury, and she states her back has never gotten any better. States previous PT, injections, and chiropractic without improvement. Sees Dr Td Abad for pain management. She reports her family Dr told her she has neuropathies in her legs and in her R arm. Reports sedentary lifestyle. Reports pain limits sitting to < 30', standing < 10', ambulating to very short distances with SPC, and sleep to < 4 hours. Reports she use [place] to [place] but hasn't been able to for 2-3 because of the pain. Subjective:  States that pain is a little better since starting pool. Any changes in Ambulatory Summary Sheet?   None        Objective:  See aquatic flow sheet for details          Exercises: (No more than 4 columns)   Exercise/Equipment Date  4/23/19 5/3/19 #2/10 5-5-19 2/10 5-14-19 # 4/10 5/17/19 (5/10 pool)       pool pool pool    WARM UP  See aquatic therapy flow sheet for details See aquatic therapy flow sheet for details See aquatic therapy flow sheet for details See aquatic flow sheet for treatment details                      Sitting         B scap retraction  2x5/2\"       TrA 2x5/3\"       Marching  1x10 L/R       LAQ's 1x10 L/R                  STANDING                                                                     PROPRIOCEPTION                                                MODALITIES                            Other Therapeutic Activities/Education: Pt received education on their current pathology and how their condition affects functional activities. Pt understood discussion and questions were answered. Pt understands their activity limitations and understands rational for treatment progression. Home Exercise Program:  Written HEP as outlined above to patient. Will review as needed. Manual Treatments:        Modalities:        Communication with other providers:  POC to referring physician. Assessment: Pain increased to 8/10 in LB on R and now 2/10 on L after therapy. Added more exercises today; her agreement to try; but then pain increase in LB and trigger of neuropathy in shldr with arm exercises.              Plan for Next Session:  continue aquatic therapy      Time In / Time Out:  8865-8825      Timed Code/Total Treatment Minutes: 39 aquatics      Next Progress Note due:  10 Rxs      Plan of Care Interventions:  [x] Therapeutic Exercise  [] Modalities:  [] Therapeutic Activity     [] Ultrasound  [] Estim  [] Gait Training      [] Cervical Traction [] Lumbar Traction  [] Neuromuscular Re-education    [] Cold/hotpack [] Iontophoresis   [x] Instruction in HEP      [] Vasopneumatic   [] Dry Needling    [] Manual Therapy               [x] Aquatic Therapy              Electronically signed by:  Lokesh Sorensen PTA    5/17/2019, 6:01 PM

## 2019-05-21 ENCOUNTER — HOSPITAL ENCOUNTER (OUTPATIENT)
Dept: PHYSICAL THERAPY | Age: 59
Setting detail: THERAPIES SERIES
Discharge: HOME | End: 2019-05-21
Payer: MEDICAID

## 2019-05-21 PROCEDURE — 97113 AQUATIC THERAPY/EXERCISES: CPT

## 2019-05-21 NOTE — FLOWSHEET NOTE
Outpatient Physical Therapy  [place]           [x] Phone: [phone]   Fax: [phone]  [place]           [] Phone: [phone]   Fax: [phone]        Physical Therapy Daily Treatment Note  Date:  2019    Patient Name:  Sima Tay    :  1960  MRN: [id number]  Restrictions/Precautions:    Diagnosis:   Diagnosis: back, leg and arm pain  Date of Injury/Surgery:   Treatment Diagnosis:  chronic mult-jt pain with poor strength and endurance    Insurance/Certification information: Medicaid   Referring Physician: Dr Maria E Phillip MD  Next Doctor Visit:    Plan of care signed (Y/N): Pending   Outcome Measure: Mod Oswestry  34/50;  68%  Visit# / total visits:   6/10  Pain level: 7/10 LBP on R only today   Goals:       Short term goals  Time Frame for Short term goals: 10 Rxs  Short term goal 1: Pt will be compliant with attendance of pool appointments  Short term goal 2: Pt will report 20 - 30% improvement in her pain during ambulation  Short term goal 3: Oswestry score < 25/50 indicating functional improvement       Summary of Evaluation: Pt reporting chronic back, B leg, B shoulder, B wrist and R jaw pain. Pt reporting initially a back injury 20 years ago, lifting injury, and she states her back has never gotten any better. States previous PT, injections, and chiropractic without improvement. Sees Dr Arnoldo Rachel for pain management. She reports her family Dr told her she has neuropathies in her legs and in her R arm. Reports sedentary lifestyle. Reports pain limits sitting to < 30', standing < 10', ambulating to very short distances with SPC, and sleep to < 4 hours. Reports she use to [place] to [place] but hasn't been able to for 2-3 because of the pain. Subjective:  States that pain has gotten  little better since starting pool. Any changes in Ambulatory Summary Sheet?   None        Objective:  See aquatic flow sheet for details          Exercises: (No more than 4 columns)   Exercise/Equipment 5-5-19 2/10 5-14-19 # 4/10 5/17/19 (5/10 pool) 5-21-19  6/10#      pool pool     WARM UP See aquatic therapy flow sheet for details See aquatic therapy flow sheet for details See aquatic flow sheet for treatment details See aquatic flow sheet for treatment details                    Sitting        B scap retraction        TrA       Marching        LAQ's                 STANDING                                                             PROPRIOCEPTION                                          MODALITIES                         Other Therapeutic Activities/Education: Pt received education on their current pathology and how their condition affects functional activities. Pt understood discussion and questions were answered. Pt understands their activity limitations and understands rational for treatment progression. Home Exercise Program:  Written HEP as outlined above to patient. Will review as needed. Manual Treatments:        Modalities:        Communication with other providers:  POC to referring physician. Assessment: Pain 7/10 in LB on R and now 6/10 on L after pool therapy.               Plan for Next Session:  continue aquatic therapy      Time In / Time Out:  1626-7507      Timed Code/Total Treatment Minutes: 40 aquatics      Next Progress Note due:  10 Rxs      Plan of Care Interventions:  [x] Therapeutic Exercise  [] Modalities:  [] Therapeutic Activity     [] Ultrasound  [] Estim  [] Gait Training      [] Cervical Traction [] Lumbar Traction  [] Neuromuscular Re-education    [] Cold/hotpack [] Iontophoresis   [x] Instruction in HEP      [] Vasopneumatic   [] Dry Needling    [] Manual Therapy               [x] Aquatic Therapy              Electronically signed by:  Xin Walker PTA    5/21/2019, 2:40 PM

## 2019-05-24 ENCOUNTER — OFFICE VISIT (OUTPATIENT)
Dept: CARDIOLOGY CLINIC | Age: 59
End: 2019-05-24
Payer: MEDICAID

## 2019-05-24 VITALS
BODY MASS INDEX: 44.41 KG/M2 | DIASTOLIC BLOOD PRESSURE: 80 MMHG | SYSTOLIC BLOOD PRESSURE: 124 MMHG | WEIGHT: 293 LBS | HEIGHT: 68 IN | HEART RATE: 72 BPM

## 2019-05-24 DIAGNOSIS — I10 ESSENTIAL HYPERTENSION: Primary | ICD-10-CM

## 2019-05-24 DIAGNOSIS — J44.1 COPD EXACERBATION (HCC): ICD-10-CM

## 2019-05-24 DIAGNOSIS — I73.9 CLAUDICATION (HCC): ICD-10-CM

## 2019-05-24 DIAGNOSIS — E11.9 TYPE 2 DIABETES MELLITUS WITHOUT COMPLICATION, WITHOUT LONG-TERM CURRENT USE OF INSULIN (HCC): ICD-10-CM

## 2019-05-24 PROCEDURE — 99213 OFFICE O/P EST LOW 20 MIN: CPT | Performed by: NURSE PRACTITIONER

## 2019-05-24 RX ORDER — PRAVASTATIN SODIUM 20 MG
TABLET ORAL
Qty: 30 TABLET | Refills: 5 | Status: SHIPPED | OUTPATIENT
Start: 2019-05-24 | End: 2022-03-07 | Stop reason: ALTCHOICE

## 2019-05-24 ASSESSMENT — ENCOUNTER SYMPTOMS
BACK PAIN: 0
EYE REDNESS: 0
SHORTNESS OF BREATH: 0
COUGH: 0
ABDOMINAL DISTENTION: 0
CHEST TIGHTNESS: 0
SORE THROAT: 0
EYE ITCHING: 0
ABDOMINAL PAIN: 0
SINUS PRESSURE: 0

## 2019-05-24 NOTE — PROGRESS NOTES
CARDIOLOGY  NOTE      5/24/2019    RE: Jose Suarez  (1960)                               TO:  Dr. Bess Mccarthy MD  The primary cardiologist is Dr French Bullock    CC:  Denies the following complaints; Chest Pain  Palpitations  Shortness of Breath  Edema  Dizziness  Syncope    Here for follow up on HTN    HPI: Thank you for involving me in taking care of your patient Jose Suarez, who is a  61y.o. year old female with a history of HTN. She is seen today for a follow up on HTN. She during this visit  denies chest pain, palpitations, shortness of breath, edema, dizziness or syncope. She reports that she will have intermittent swelling of the legs. She takes lasix and it helps the swelling. She overall feels good.  Walks with a cane    Vitals:    05/24/19 1258   BP: 124/80   Pulse: 72       Current Outpatient Medications   Medication Sig Dispense Refill    linagliptin (TRADJENTA) 5 MG tablet Take 1 tablet by mouth daily 30 tablet 3    metFORMIN (GLUCOPHAGE) 850 MG tablet Take 1 tablet by mouth 2 times daily (with meals) 60 tablet 3    insulin lispro (HUMALOG) 100 UNIT/ML injection vial Inject 30 Units into the skin 3 times daily (before meals) 1 vial 3    guaiFENesin (MUCINEX) 600 MG extended release tablet Take 1 tablet by mouth 2 times daily 20 tablet 0    mometasone-formoterol (DULERA) 100-5 MCG/ACT inhaler Inhale 2 puffs into the lungs 2 times daily 13 g 3    albuterol (PROVENTIL) (2.5 MG/3ML) 0.083% nebulizer solution   0    divalproex (DEPAKOTE) 250 MG DR tablet take 1 tablet by mouth twice a day  0    pravastatin (PRAVACHOL) 20 MG tablet take 1 tablet by mouth once daily 30 tablet 5    amLODIPine-benazepril (LOTREL) 10-40 MG per capsule Take 1 capsule by mouth daily 30 capsule 6    acetaminophen (AMINOFEN) 325 MG tablet Take 2 tablets by mouth every 6 hours as needed for Pain 60 tablet 0    amLODIPine-atorvastatatin (CADUET) 10-40 MG per tablet Take 1 tablet by mouth daily      dicyclomine (BENTYL) 20 MG tablet Take 20 mg by mouth every 6 hours      VENTOLIN  (90 Base) MCG/ACT inhaler   0    LUMIGAN 0.01 % SOLN ophthalmic drops Place 1 drop into both eyes daily   1    HYDROcodone-acetaminophen (NORCO) 5-325 MG per tablet 1 tablet 3 times daily as needed. 0    theophylline (THEODUR) 300 MG extended release tablet 1 tablet daily  0    pantoprazole (PROTONIX) 40 MG tablet Take 40 mg by mouth daily      furosemide (LASIX) 20 MG tablet take 1/2 tablet every few weeks  0    LYRICA 75 MG capsule take 1 capsule by mouth twice a day  0    ibuprofen (ADVIL;MOTRIN) 600 MG tablet Take 1 tablet by mouth every 8 hours as needed for Pain 30 tablet 0    Misc. Devices (CANE) MISC 1 Device by Does not apply route daily as needed. 1 each 0    DULoxetine (CYMBALTA) 60 MG capsule Take 60 mg by mouth daily.  insulin glargine (LANTUS) 100 UNIT/ML injection Inject 50 Units into the skin nightly        No current facility-administered medications for this visit. Allergies: Codeine; Adhesive tape; and Toradol [ketorolac tromethamine]  Past Medical History:   Diagnosis Date    Arthritis     Asthma     [facility] [address]     COPD [facility]chronic obstructive pulmonary disease) [facility] [address]     Depression     Diabetes mellitus (HCC)     Edema     Glaucoma     History of Doppler ultrasound 03/26/2018    Arterial-bilateral VERONICA's show normal arterial flow. No signif occlusive arterial disease.     History of nuclear stress test 02/22/2018    cardiolite-EF45%,normal    Hx of Doppler echocardiogram 02/22/2018    EF50-60%,no valvular disease    Hyperlipidemia     Hypertension     IBS (irritable bowel syndrome)      Past Surgical History:   Procedure Laterality Date    TUBAL LIGATION       Family History   Problem Relation Age of Onset    Cancer Mother     Diabetes Mother     Heart Disease Mother     Cancer Father     High Blood Pressure Father     Cancer Other     Cancer Other     Diabetes Sister     Heart Disease Sister     High Blood Pressure Brother     Depression Sister      Social History     Tobacco Use    Smoking status: Former Smoker    Smokeless tobacco: Never Used   Substance Use Topics    Alcohol use: No        Review of Systems   Constitutional: Negative for activity change, appetite change and fatigue. HENT: Negative for congestion, sinus pressure and sore throat. Eyes: Negative for redness and itching. Respiratory: Negative for cough, chest tightness and shortness of breath. Cardiovascular: Negative for chest pain, palpitations and leg swelling. Gastrointestinal: Negative for abdominal distention and abdominal pain. Genitourinary: Negative for difficulty urinating and dysuria. Musculoskeletal: Positive for gait problem. Negative for arthralgias and back pain. Neurological: Negative for dizziness, syncope and light-headedness. Psychiatric/Behavioral: Negative for confusion. The patient is not nervous/anxious. Objective:      Physical Exam:  /80   Pulse 72   Ht 5' 8\" (1.727 m)   Wt 295 lb (133.8 kg)   BMI 44.85 kg/m²   Wt Readings from Last 3 Encounters:   05/24/19 295 lb (133.8 kg)   03/30/19 (!) 316 lb (143.3 kg)   02/25/19 (!) 316 lb 8 oz (143.6 kg)     Body mass index is 44.85 kg/m². Physical Exam   Constitutional: She is oriented to person, place, and time. She appears well-developed and well-nourished. HENT:   Head: Atraumatic. Eyes: Pupils are equal, round, and reactive to light. Conjunctivae are normal. Right eye exhibits no discharge. Left eye exhibits no discharge. Neck: Neck supple. No thyromegaly present. Cardiovascular: Normal rate, regular rhythm, normal heart sounds and intact distal pulses. Exam reveals no gallop and no friction rub. No murmur heard. Pulmonary/Chest: Effort normal and breath sounds normal. No respiratory distress. She has no wheezes.  She has no rales.   Abdominal: Soft. Bowel sounds are normal. She exhibits no distension. There is no tenderness. Musculoskeletal: She exhibits no edema or deformity. Neurological: She is alert and oriented to person, place, and time. Skin: Skin is warm and dry. No rash noted. No erythema. No pallor. Psychiatric: Judgment and thought content normal.       DATA:  No results found for: CKTOTAL, CKMB, CKMBINDEX, TROPONINI  BNP:  No results found for: BNP  PT/INR:  No results found for: PTINR  Lab Results   Component Value Date    LABA1C 10.2 (H) 12/17/2018    LABA1C 7.8 (H) 12/13/2017     Lab Results   Component Value Date    CHOL 105 12/17/2018    TRIG 113 12/17/2018    HDL 39 (L) 12/17/2018    LDLCALC 68 12/09/2016    LDLDIRECT 55 12/17/2018     Lab Results   Component Value Date    ALT 80 (H) 02/22/2019     (H) 02/22/2019     TSH:  No results found for: TSH      Assessment/ Plan:    Patient seen, interviewed and examined. Testing was reviewed. HTN  Stable  Vitals:    05/24/19 1258   BP: 124/80   Pulse: 72   continue lotrel and caduet    Swelling of lower legs  Continue lasix  Arterial doppler reviewed, no claudication noted    DM  On med  AIC per pcp    COPD  Denies SOB  On theophylline    Patient to follow up in 6 months    Patient is encouraged to exercise even a brisk walk for 30 minutes at least 3 to 4 times a week. Lifestyle and risk factor modificatons discussed. Various goals are discussed and questions answered. Continue current medications. Appropriate prescriptions are addressed. Questions answered and patient verbalizes understanding. Call for any problems, questions, or concerns.

## 2019-05-27 ENCOUNTER — APPOINTMENT (OUTPATIENT)
Dept: GENERAL RADIOLOGY | Age: 59
End: 2019-05-27
Payer: MEDICAID

## 2019-05-27 VITALS
HEART RATE: 80 BPM | TEMPERATURE: 98.4 F | BODY MASS INDEX: 44.41 KG/M2 | OXYGEN SATURATION: 95 % | SYSTOLIC BLOOD PRESSURE: 150 MMHG | DIASTOLIC BLOOD PRESSURE: 81 MMHG | RESPIRATION RATE: 18 BRPM | WEIGHT: 293 LBS | HEIGHT: 68 IN

## 2019-05-27 PROCEDURE — 73630 X-RAY EXAM OF FOOT: CPT

## 2019-05-27 PROCEDURE — 73090 X-RAY EXAM OF FOREARM: CPT

## 2019-05-27 ASSESSMENT — PAIN DESCRIPTION - PAIN TYPE: TYPE: ACUTE PAIN

## 2019-05-27 ASSESSMENT — PAIN SCALES - GENERAL: PAINLEVEL_OUTOF10: 8

## 2019-05-28 ENCOUNTER — APPOINTMENT (OUTPATIENT)
Dept: CT IMAGING | Age: 59
End: 2019-05-28
Payer: MEDICAID

## 2019-05-28 ENCOUNTER — HOSPITAL ENCOUNTER (EMERGENCY)
Age: 59
Discharge: HOME | End: 2019-05-28
Payer: MEDICAID

## 2019-05-28 DIAGNOSIS — S90.121A CONTUSION OF LESSER TOE OF RIGHT FOOT WITHOUT DAMAGE TO NAIL, INITIAL ENCOUNTER: ICD-10-CM

## 2019-05-28 DIAGNOSIS — S50.11XA CONTUSION OF RIGHT FOREARM, INITIAL ENCOUNTER: ICD-10-CM
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
## 2019-05-28 PROCEDURE — 99283 EMERGENCY DEPT VISIT LOW MDM: CPT

## 2019-05-28 RX ORDER — NAPROXEN 500 MG/1
500 TABLET ORAL 2 TIMES DAILY
Qty: 60 TABLET | Refills: 0 | Status: SHIPPED | OUTPATIENT
Start: 2019-05-28 | End: 2021-09-22

## 2019-05-28 NOTE — ED TRIAGE NOTES
Patient to the ED with cc fall, patient states she was in in the shower and hit her head, right arm pain and right foot pain

## 2019-05-28 NOTE — ED PROVIDER NOTES
eMERGENCY dEPARTMENT eNCOUnter        [address] [facility]    Chief Complaint   Patient presents with   Gabriel Dowd       HPI    Kapil Norton is a 61 y.o. female who presents after a fall. Onset was prior to arrival.  Context:  Patient slipped and fell in the shower. She denies hitting her head or LOC on my exam--she just refused CT scans which were ordered in triage. Pain is localized to the right 5th toe and right forearm. Constant since onset. Characterized as throbbing. Aggravated by weightbearing/movement, alleviated by nothing. Severity of the pain is an 8 on a scale of 0-10. Patient reports associated bruising to the toe. She is not on any blood thinners. REVIEW OF SYSTEMS    See HPI for further details. Review of systems otherwise negative. Musculoskeletal:  + toe pain, forearm pain. Integument:  + bruise to toe. Neurologic:  Denies LOC. PAST MEDICAL HISTORY    Past Medical History:   Diagnosis Date    Arthritis     Asthma     [facility] 1 disorder ([facility] [address]     COPD (chronic obstructive pulmonary disease) ([facility] [address]     Depression     Diabetes mellitus (HCC)     Edema     Glaucoma     History of Doppler ultrasound 03/26/2018    Arterial-bilateral VERONICA's show normal arterial flow. No signif occlusive arterial disease.     History of nuclear stress test 02/22/2018    cardiolite-EF45%,normal    Hx of Doppler echocardiogram 02/22/2018    EF50-60%,no valvular disease    Hyperlipidemia     Hypertension     IBS (irritable bowel syndrome)        SURGICAL HISTORY    Past Surgical History:   Procedure Laterality Date    TUBAL LIGATION         CURRENT MEDICATIONS    Current Outpatient Rx   Medication Sig Dispense Refill    naproxen (NAPROSYN) 500 MG tablet Take 1 tablet by mouth 2 times daily 60 tablet 0    pravastatin (PRAVACHOL) 20 MG tablet take 1 tablet by mouth once daily 30 tablet 5    linagliptin (TRADJENTA) 5 MG tablet Take 1 tablet by mouth daily 30 tablet 3    metFORMIN (GLUCOPHAGE) 850 MG tablet Take 1 tablet by mouth 2 times daily (with meals) 60 tablet 3    insulin lispro (HUMALOG) 100 UNIT/ML injection vial Inject 30 Units into the skin 3 times daily (before meals) 1 vial 3    guaiFENesin (MUCINEX) 600 MG extended release tablet Take 1 tablet by mouth 2 times daily 20 tablet 0    mometasone-formoterol (DULERA) 100-5 MCG/ACT inhaler Inhale 2 puffs into the lungs 2 times daily 13 g 3    albuterol (PROVENTIL) (2.5 MG/3ML) 0.083% nebulizer solution   0    divalproex (DEPAKOTE) 250 MG DR tablet take 1 tablet by mouth twice a day  0    amLODIPine-benazepril (LOTREL) 10-40 MG per capsule Take 1 capsule by mouth daily 30 capsule 6    acetaminophen (AMINOFEN) 325 MG tablet Take 2 tablets by mouth every 6 hours as needed for Pain 60 tablet 0    amLODIPine-atorvastatatin (CADUET) 10-40 MG per tablet Take 1 tablet by mouth daily      dicyclomine (BENTYL) 20 MG tablet Take 20 mg by mouth every 6 hours      VENTOLIN  (90 Base) MCG/ACT inhaler   0    LUMIGAN 0.01 % SOLN ophthalmic drops Place 1 drop into both eyes daily   1    HYDROcodone-acetaminophen (NORCO) 5-325 MG per tablet 1 tablet 3 times daily as needed. 0    theophylline (THEODUR) 300 MG extended release tablet 1 tablet daily  0    pantoprazole (PROTONIX) 40 MG tablet Take 40 mg by mouth daily      furosemide (LASIX) 20 MG tablet take 1/2 tablet every few weeks  0    LYRICA 75 MG capsule take 1 capsule by mouth twice a day  0    ibuprofen (ADVIL;MOTRIN) 600 MG tablet Take 1 tablet by mouth every 8 hours as needed for Pain 30 tablet 0    Misc. Devices (CANE) MISC 1 Device by Does not apply route daily as needed. 1 each 0    DULoxetine (CYMBALTA) 60 MG capsule Take 60 mg by mouth daily.       insulin glargine (LANTUS) 100 UNIT/ML injection Inject 50 Units into the skin nightly          ALLERGIES    Allergies   Allergen Reactions    Codeine     Adhesive Tape Itching    Toradol [Ketorolac Tromethamine] Other (See Comments) Headache for 5-6 days       FAMILY HISTORY    Family History   Problem Relation Age of Onset   Brandee Cummins Cancer Mother     Diabetes Mother     Heart Disease Mother     Cancer Father     High Blood Pressure Father     Cancer Other     Cancer Other     Diabetes Sister     Heart Disease Sister     High Blood Pressure Brother     Depression Sister        SOCIAL HISTORY    Social History     Socioeconomic History    Marital status: Legally      Spouse name: None    Number of children: None    Years of education: None    Highest education level: None   Occupational History    None   Social Needs    Financial resource strain: None    Food insecurity:     Worry: None     Inability: None    Transportation needs:     Medical: None     Non-medical: None   Tobacco Use    Smoking status: Former Smoker    Smokeless tobacco: Never Used   Substance and Sexual Activity    Alcohol use: No    Drug use: No    Sexual activity: None   Lifestyle    Physical activity:     Days per week: None     Minutes per session: None    Stress: None   Relationships    Social connections:     Talks on phone: None     Gets together: None     Attends Sikhism service: None     Active member of club or organization: None     Attends meetings of clubs or organizations: None     Relationship status: None    Intimate partner violence:     Fear of current or ex partner: None     Emotionally abused: None     Physically abused: None     Forced sexual activity: None   Other Topics Concern    None   Social History Narrative    None       PHYSICAL EXAM    VITAL SIGNS: BP (!) 150/81   Pulse 80   Temp 98.4 °F (36.9 °C) (Oral)   Resp 18   Ht 5' 8\" (1.727 m)   Wt 295 lb (133.8 kg)   SpO2 95%   Breastfeeding? No   BMI 44.85 kg/m²   Constitutional:  Well developed, well nourished, no acute distress, non-toxic appearance   HENT:  NC/AT. Ears, nose, mouth normal.  Neck:  No cervical spinal tenderness.   Respiratory:  Normal respiratory effort. Lungs CTAB. Cardiovascular:  RRR. GI:  Soft, non-tender. Musculoskeletal:  There is some swelling and bruising to the right 5th toe with ttp, no gross deformity. No other tenderness of the foot. Dorsalis pedis pulse intact. No swelling or deformity of the right forearm. No localized tenderness. Normal ROM of the elbow and wrist.  DP intact, cap refill brisk. Integument:  Warm and dry. Neurologic:  Alert and oriented. RADIOLOGY/PROCEDURES    Xr Radius Ulna Right (2 Views)    Result Date: 5/27/2019  EXAMINATION: 2 XRAY VIEWS OF THE RIGHT FOREARM; 3 XRAY VIEWS OF THE RIGHT FOOT 5/27/2019 8:07 pm COMPARISON: None. HISTORY: ORDERING SYSTEM PROVIDED HISTORY: injury TECHNOLOGIST PROVIDED HISTORY: Reason for exam:->injury Ordering Physician Provided Reason for Exam: FELL Acuity: Unknown Type of Exam: Unknown FINDINGS: Right radius and ulna: No acute fracture or dislocation is seen involving the right forearm. No soft tissue abnormalities are detected. Medial epicondylar enthesopathy is noted at the elbow. Right foot: Lisfranc joint is congruent. No fracture or dislocation is identified. No soft tissue abnormalities are detected. On the lateral examination, the subtalar joint is unremarkable with maintenance of the anterior process of the calcaneus. Prominent calcaneal enthesopathy noted. Right radius and ulna: No acute abnormality detected. Right foot: No acute abnormality detected. Xr Foot Right (min 3 Views)    Result Date: 5/27/2019  EXAMINATION: 2 XRAY VIEWS OF THE RIGHT FOREARM; 3 XRAY VIEWS OF THE RIGHT FOOT 5/27/2019 8:07 pm COMPARISON: None. HISTORY: ORDERING SYSTEM PROVIDED HISTORY: injury TECHNOLOGIST PROVIDED HISTORY: Reason for exam:->injury Ordering Physician Provided Reason for Exam: FELL Acuity: Unknown Type of Exam: Unknown FINDINGS: Right radius and ulna: No acute fracture or dislocation is seen involving the right forearm.   No soft tissue abnormalities are detected. Medial epicondylar enthesopathy is noted at the elbow. Right foot: Lisfranc joint is congruent. No fracture or dislocation is identified. No soft tissue abnormalities are detected. On the lateral examination, the subtalar joint is unremarkable with maintenance of the anterior process of the calcaneus. Prominent calcaneal enthesopathy noted. Right radius and ulna: No acute abnormality detected. Right foot: No acute abnormality detected. ED COURSE & MEDICAL DECISION MAKING    Pertinent Labs & Imaging studies reviewed. (See chart for details)  -  Patient seen and evaluated in the emergency department. -  Triage and nursing notes reviewed and incorporated. -  Old chart records reviewed and incorporated. -  Supervising physician was Dr. Zohaib De La O.  Patient was seen independently. -  Differential diagnosis includes: fracture, dislocation, sprain, osteoarthritis, gout, tendonitis, others  -  Work-up included:  XRs--patient refused CT head and c-spine ordered in triage. She denies hitting head or neck pain on my exam.  No physical findings on exam, OK to cancel.  -  Results discussed with patient. Imaging negative for fx or dislocation. Buddy taping of the toes. Rx Naprosyn. Instructed on RICE. FU with PCP, return to the ED as needed for new/worsening symptoms. Patient agreeable with plan of care and disposition.  -  Patient dc home. FINAL IMPRESSION    1. Fall, initial encounter    2. Contusion of lesser toe of right foot without damage to nail, initial encounter    3.  Contusion of right forearm, initial encounter                  Marissa Shahid PA-C  05/28/19 [id number]

## 2019-05-28 NOTE — ED NOTES
Ok per Dr Uriel Tolbert to place CT head and CT cervical spine imaging      Summer Romberg, RN  05/27/19 4575

## 2019-05-31 ENCOUNTER — HOSPITAL ENCOUNTER (OUTPATIENT)
Dept: PHYSICAL THERAPY | Age: 59
Discharge: HOME | End: 2019-05-31

## 2019-05-31 NOTE — FLOWSHEET NOTE
Outpatient Physical Therapy  [facility]           [x] Phone: [phone]   Fax: [phone]  [facility]           [] Phone: [phone]   Fax: [phone]        Physical Therapy Daily Treatment Note  Date:  2019    Patient Name:  Waldo Shahid    :  1960  MRN: [id number]  Restrictions/Precautions:    Diagnosis:   Diagnosis: back, leg and arm pain  Date of Injury/Surgery:   Treatment Diagnosis:  chronic mult-jt pain with poor strength and endurance    Insurance/Certification information: Medicaid   Referring Physician: Dr Placido Fernando MD  Next Doctor Visit:    Plan of care signed (Y/N): Pending   Outcome Measure: Mod Oswestry  34/50;  68%  Visit# / total visits:   ---  Pain level:    ---   Goals:       Short term goals  Time Frame for Short term goals: 10 Rxs  Short term goal 1: Pt will be compliant with attendance of pool appointments  Short term goal 2: Pt will report 20 - 30% improvement in her pain during ambulation  Short term goal 3: Oswestry score < 25/50 indicating functional improvement       Summary of Evaluation: Pt reporting chronic back, B leg, B shoulder, B wrist and R jaw pain. Pt reporting initially a back injury 20 years ago, lifting injury, and she states her back has never gotten any better. States previous PT, injections, and chiropractic without improvement. Sees Dr Hood Parra for pain management. She reports her family Dr told her she has neuropathies in her legs and in her R arm. Reports sedentary lifestyle. Reports pain limits sitting to < 30', standing < 10', ambulating to very short distances with SPC, and sleep to < 4 hours. Reports she use to enjoy going to [employer] to exercise but hasn't been able to for 2-3 because of the pain. Subjective:  -----        Any changes in Ambulatory Summary Sheet?   None        Objective:  See aquatic flow sheet for details          Exercises: (No more than 4 columns)   Exercise/Equipment 19 # 4/10 5/17/19 (5/10 pool) 19 6/10# 5-24-19 5/31/2019      pool       WARM UP See aquatic therapy flow sheet for details See aquatic flow sheet for treatment details See aquatic flow sheet for treatment details See aquatic sheet Patient cancelled - had fall recently                      Sitting         B scap retraction         TrA        Marching         LAQ's                   STANDING                                                                     PROPRIOCEPTION                                                MODALITIES                            Other Therapeutic Activities/Education: Pt received education on their current pathology and how their condition affects functional activities. Pt understood discussion and questions were answered. Pt understands their activity limitations and understands rational for treatment progression. Home Exercise Program:  Written HEP as outlined above to patient. Will review as needed. Manual Treatments:        Modalities:        Communication with other providers:  POC to referring physician.       Assessment:               Plan for Next Session:  continue aquatic therapy      Time In / Time Out: xxx      Timed Code/Total Treatment Minutes: 0      Next Progress Note due:  10 Rxs      Plan of Care Interventions:  [x] Therapeutic Exercise  [] Modalities:  [] Therapeutic Activity     [] Ultrasound  [] Estim  [] Gait Training      [] Cervical Traction [] Lumbar Traction  [] Neuromuscular Re-education    [] Cold/hotpack [] Iontophoresis   [x] Instruction in HEP      [] Vasopneumatic   [] Dry Needling    [] Manual Therapy               [x] Aquatic Therapy              Electronically signed by:  Jennifer Castaneda PTA,   5/31/2019, 6:59 PM

## 2019-06-07 ENCOUNTER — HOSPITAL ENCOUNTER (OUTPATIENT)
Dept: PHYSICAL THERAPY | Age: 59
Setting detail: THERAPIES SERIES
Discharge: HOME | End: 2019-06-07
Payer: MEDICAID

## 2019-06-07 PROCEDURE — 97113 AQUATIC THERAPY/EXERCISES: CPT

## 2019-06-08 NOTE — FLOWSHEET NOTE
Outpatient Physical Therapy  [place]           [x] Phone: [phone]   Fax: [phone]  Lee Mendez           [] Phone: [phone]   Fax: [phone]        Physical Therapy Daily Treatment Note  Date:  2019    Patient Name:  Dolly Gerard    :  1960  MRN: [id number]  Restrictions/Precautions:    Diagnosis:   Diagnosis: back, leg and arm pain  Date of Injury/Surgery:   Treatment Diagnosis:  chronic mult-jt pain with poor strength and endurance    Insurance/Certification information: Medicaid   Referring Physician: Dr Burton Mandujano MD  Next Doctor Visit:    Plan of care signed (Y/N): Pending   Outcome Measure: Mod Oswestry  34/50;  68%  Visit# / total visits:   8/10  Pain level:    7/10   Goals:       Short term goals  Time Frame for Short term goals: 10 Rxs  Short term goal 1: Pt will be compliant with attendance of pool appointments  Short term goal 2: Pt will report 20 - 30% improvement in her pain during ambulation  Short term goal 3: Oswestry score < 25/50 indicating functional improvement       Summary of Evaluation: Pt reporting chronic back, B leg, B shoulder, B wrist and R jaw pain. Pt reporting initially a back injury 20 years ago, lifting injury, and she states her back has never gotten any better. States previous PT, injections, and chiropractic without improvement. Sees Dr Mariana Amaro for pain management. She reports her family Dr told her she has neuropathies in her legs and in her R arm. Reports sedentary lifestyle. Reports pain limits sitting to < 30', standing < 10', ambulating to very short distances with SPC, and sleep to < 4 hours. Reports she use to enjoy going to [employer] to exercise but hasn't been able to for 2-3 because of the pain. Subjective:  See aquatic flow sheet for details        Any changes in Ambulatory Summary Sheet?   None        Objective:  See aquatic flow sheet for details          Exercises: (No more than 4 columns)   Exercise/Equipment 19 # 4/10 5/17/19 (5/10 pool) 5-21-19  6/10# 5-24-19 5/31/2019 6/7/2019 (8/10)      pool        WARM UP See aquatic therapy flow sheet for details See aquatic flow sheet for treatment details See aquatic flow sheet for treatment details See aquatic sheet Patient cancelled - had fall recently See aquatic flow sheet for details                        Sitting          B scap retraction          TrA         Marching          LAQ's                     STANDING                                                                             PROPRIOCEPTION                                                      MODALITIES                               Other Therapeutic Activities/Education: Pt received education on their current pathology and how their condition affects functional activities. Pt understood discussion and questions were answered. Pt understands their activity limitations and understands rational for treatment progression. Home Exercise Program:  Written HEP as outlined above to patient. Will review as needed. Manual Treatments:        Modalities:        Communication with other providers:  POC to referring physician.       Assessment: Pain level after aquatic session 6/10              Plan for Next Session:  continue aquatic therapy      Time In / Time Out: 8677-5065      Timed Code/Total Treatment Minutes:  45 min aquatics      Next Progress Note due:  10 Rxs      Plan of Care Interventions:  [x] Therapeutic Exercise  [] Modalities:  [] Therapeutic Activity     [] Ultrasound  [] Estim  [] Gait Training      [] Cervical Traction [] Lumbar Traction  [] Neuromuscular Re-education    [] Cold/hotpack [] Iontophoresis   [x] Instruction in HEP      [] Vasopneumatic   [] Dry Needling    [] Manual Therapy               [x] Aquatic Therapy              Electronically signed by:  Khanh Carbone PTA,   6/8/2019, 10:28 AM

## 2019-07-15 ENCOUNTER — HOSPITAL ENCOUNTER (EMERGENCY)
Age: 59
Discharge: HOME | End: 2019-07-15
Payer: MEDICAID

## 2019-07-15 ENCOUNTER — APPOINTMENT (OUTPATIENT)
Dept: GENERAL RADIOLOGY | Age: 59
End: 2019-07-15
Payer: MEDICAID

## 2019-07-15 ENCOUNTER — APPOINTMENT (OUTPATIENT)
Dept: CT IMAGING | Age: 59
End: 2019-07-15
Payer: MEDICAID

## 2019-07-15 VITALS
DIASTOLIC BLOOD PRESSURE: 71 MMHG | HEART RATE: 83 BPM | TEMPERATURE: 98.1 F | WEIGHT: 293 LBS | SYSTOLIC BLOOD PRESSURE: 153 MMHG | BODY MASS INDEX: 44.41 KG/M2 | RESPIRATION RATE: 18 BRPM | HEIGHT: 68 IN | OXYGEN SATURATION: 94 %

## 2019-07-15 DIAGNOSIS — S16.1XXA STRAIN OF NECK MUSCLE, INITIAL ENCOUNTER: ICD-10-CM
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
## 2019-07-15 DIAGNOSIS — M25.512 ACUTE PAIN OF LEFT SHOULDER: ICD-10-CM

## 2019-07-15 PROCEDURE — 71046 X-RAY EXAM CHEST 2 VIEWS: CPT

## 2019-07-15 PROCEDURE — 6370000000 HC RX 637 (ALT 250 FOR IP): Performed by: PHYSICIAN ASSISTANT

## 2019-07-15 PROCEDURE — 99284 EMERGENCY DEPT VISIT MOD MDM: CPT

## 2019-07-15 PROCEDURE — 70450 CT HEAD/BRAIN W/O DYE: CPT

## 2019-07-15 PROCEDURE — 73030 X-RAY EXAM OF SHOULDER: CPT

## 2019-07-15 PROCEDURE — 72125 CT NECK SPINE W/O DYE: CPT

## 2019-07-15 RX ORDER — LIDOCAINE 4 G/G
1 PATCH TOPICAL ONCE
Status: DISCONTINUED | OUTPATIENT
Start: 2019-07-15 | End: 2019-07-15 | Stop reason: HOSPADM

## 2019-07-15 RX ORDER — ONDANSETRON 4 MG/1
4 TABLET, ORALLY DISINTEGRATING ORAL ONCE
Status: DISCONTINUED | OUTPATIENT
Start: 2019-07-15 | End: 2019-07-15 | Stop reason: HOSPADM

## 2019-07-15 RX ORDER — CYCLOBENZAPRINE HCL 10 MG
10 TABLET ORAL NIGHTLY PRN
Qty: 10 TABLET | Refills: 0 | Status: SHIPPED | OUTPATIENT
Start: 2019-07-15 | End: 2019-07-25

## 2019-07-15 RX ORDER — LIDOCAINE 4 G/G
1 PATCH TOPICAL DAILY
Qty: 30 PATCH | Refills: 0 | Status: SHIPPED | OUTPATIENT
Start: 2019-07-15 | End: 2019-08-14

## 2019-07-15 RX ORDER — CYCLOBENZAPRINE HCL 10 MG
10 TABLET ORAL ONCE
Status: COMPLETED | OUTPATIENT
Start: 2019-07-15 | End: 2019-07-15

## 2019-07-15 RX ADMIN — CYCLOBENZAPRINE HYDROCHLORIDE 10 MG: 10 TABLET, FILM COATED ORAL at 13:23

## 2019-07-15 ASSESSMENT — PAIN DESCRIPTION - PAIN TYPE: TYPE: ACUTE PAIN

## 2019-07-15 ASSESSMENT — PAIN SCALES - GENERAL: PAINLEVEL_OUTOF10: 6

## 2019-07-15 ASSESSMENT — PAIN DESCRIPTION - LOCATION: LOCATION: NECK
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
## 2019-07-15 NOTE — ED NOTES
Bed: ED-02  Expected date:   Expected time:   Means of arrival:   Comments:  EMS     Tenzin Redmond RN  07/15/19 [id number]

## 2019-08-05 ENCOUNTER — HOSPITAL ENCOUNTER (EMERGENCY)
Age: 59
Discharge: HOME | End: 2019-08-05
Payer: MEDICAID

## 2019-08-05 VITALS
HEART RATE: 98 BPM | WEIGHT: 195 LBS | DIASTOLIC BLOOD PRESSURE: 77 MMHG | BODY MASS INDEX: 29.55 KG/M2 | RESPIRATION RATE: 17 BRPM | TEMPERATURE: 98 F | OXYGEN SATURATION: 97 % | SYSTOLIC BLOOD PRESSURE: 147 MMHG | HEIGHT: 68 IN

## 2019-08-05 DIAGNOSIS — T18.0XXA FOREIGN BODY IN MOUTH, INITIAL ENCOUNTER: Primary | ICD-10-CM

## 2019-08-05 PROCEDURE — 4500000027

## 2019-08-05 PROCEDURE — 99282 EMERGENCY DEPT VISIT SF MDM: CPT

## 2019-08-05 RX ORDER — CHLORHEXIDINE GLUCONATE 0.12 MG/ML
15 RINSE ORAL 2 TIMES DAILY
Qty: 420 ML | Refills: 0 | Status: SHIPPED | OUTPATIENT
Start: 2019-08-05 | End: 2019-08-19

## 2019-08-05 ASSESSMENT — PAIN DESCRIPTION - PAIN TYPE: TYPE: ACUTE PAIN

## 2019-08-05 ASSESSMENT — PAIN DESCRIPTION - LOCATION: LOCATION: MOUTH

## 2019-08-05 ASSESSMENT — PAIN SCALES - GENERAL: PAINLEVEL_OUTOF10: 5

## 2019-08-05 NOTE — ED PROVIDER NOTES
Onset    Cancer Mother     Diabetes Mother     Heart Disease Mother     Cancer Father     High Blood Pressure Father     Cancer Other     Cancer Other     Diabetes Sister     Heart Disease Sister     High Blood Pressure Brother     Depression Sister        PHYSICAL EXAM    VITAL SIGNS: BP (!) 147/77   Pulse 98   Temp 98 °F (36.7 °C) (Oral)   Resp 17   Ht 5' 8\" (1.727 m)   Wt 195 lb (88.5 kg)   SpO2 97%   BMI 29.65 kg/m²    Constitutional:  Well developed, well nourished, no acute distress   HENT:  Atraumatic, moist mucus membranes. EOMI. No proptosis. Ear canals and TMs clear bilateral.  There is no maxillary sinus tenderness to percussion or redness/warmth. Neck/Lymphatics: supple, no JVD, no swollen nodes   Musculoskeletal:  No edema, no deformities  Integument:  Skin warm and dry, no petechiae   Neurologic:  Alert & oriented, no slurred speech  Psych: Pleasant affect, no hallucinations    Dental:       A metal circular key ring is stuck between teeth #23 & 22. Medical student, MARNIE Palacios, using  to cut both sides of key ring and extract metal key ring piece from between teeth 23&22. There is slight irritation of the gingival tissue between teeth #22 and 23 on the lingual side. No obvious abrasion, laceration. No bleeding after removal of foreign body. No tenderness palpation of teeth. Teeth are not loose with palpation. Gingival buccal interface without obvious mass or discoloration, or fluctuance to palpation. No sublingual swelling or masses  No submandibular swelling. No facial swelling or redness    Oropharynx:    Posterior pharynx without swelling, tonsillar hypertrophy. Uvula is midline and rises evenly with phonation. No sublingual swelling.      ________________________________________________________________________    Foreign Body Removal Procedure Note    Indication: Foreign body between teeth    Procedure: The area of the foreign body was prepped with betadine.

## 2019-08-27 RX ORDER — AMLODIPINE BESYLATE AND BENAZEPRIL HYDROCHLORIDE 10; 40 MG/1; MG/1
1 CAPSULE ORAL DAILY
Qty: 30 CAPSULE | Refills: 6 | Status: SHIPPED | OUTPATIENT
Start: 2019-08-27 | End: 2020-03-25 | Stop reason: SDUPTHER

## 2019-09-29 ENCOUNTER — HOSPITAL ENCOUNTER (EMERGENCY)
Age: 59
Discharge: HOME | End: 2019-09-29
Attending: EMERGENCY MEDICINE
Payer: MEDICAID

## 2019-09-29 VITALS
TEMPERATURE: 97.9 F | WEIGHT: 293 LBS | HEIGHT: 68 IN | HEART RATE: 68 BPM | RESPIRATION RATE: 18 BRPM | BODY MASS INDEX: 44.41 KG/M2 | OXYGEN SATURATION: 96 % | DIASTOLIC BLOOD PRESSURE: 78 MMHG | SYSTOLIC BLOOD PRESSURE: 149 MMHG

## 2019-09-29 DIAGNOSIS — J06.9 VIRAL URI: Primary | ICD-10-CM

## 2019-09-29 PROCEDURE — 99283 EMERGENCY DEPT VISIT LOW MDM: CPT

## 2019-09-29 PROCEDURE — 87081 CULTURE SCREEN ONLY: CPT

## 2019-09-29 PROCEDURE — 87430 STREP A AG IA: CPT

## 2019-09-29 PROCEDURE — 6370000000 HC RX 637 (ALT 250 FOR IP): Performed by: EMERGENCY MEDICINE

## 2019-09-29 RX ORDER — IBUPROFEN 600 MG/1
600 TABLET ORAL ONCE
Status: COMPLETED | OUTPATIENT
Start: 2019-09-29 | End: 2019-09-29

## 2019-09-29 RX ORDER — IBUPROFEN 600 MG/1
600 TABLET ORAL EVERY 6 HOURS PRN
Qty: 30 TABLET | Refills: 0 | Status: ON HOLD | OUTPATIENT
Start: 2019-09-29 | End: 2022-01-12 | Stop reason: HOSPADM

## 2019-09-29 RX ORDER — LIDOCAINE HYDROCHLORIDE 20 MG/ML
15 SOLUTION OROPHARYNGEAL ONCE
Status: COMPLETED | OUTPATIENT
Start: 2019-09-29 | End: 2019-09-29

## 2019-09-29 RX ADMIN — IBUPROFEN 600 MG: 600 TABLET, FILM COATED ORAL at 12:00

## 2019-09-29 RX ADMIN — LIDOCAINE HYDROCHLORIDE 15 ML: 20 SOLUTION ORAL; TOPICAL at 12:00

## 2019-09-29 ASSESSMENT — ENCOUNTER SYMPTOMS
ABDOMINAL PAIN: 0
RHINORRHEA: 0
SHORTNESS OF BREATH: 0
NAUSEA: 0
COUGH: 1
TROUBLE SWALLOWING: 0
VOICE CHANGE: 0
SORE THROAT: 1
BACK PAIN: 0
WHEEZING: 0
EYE REDNESS: 0
VOMITING: 0

## 2019-09-29 ASSESSMENT — PAIN SCALES - GENERAL
PAINLEVEL_OUTOF10: 7
PAINLEVEL_OUTOF10: 3
PAINLEVEL_OUTOF10: 3

## 2019-09-29 ASSESSMENT — PAIN DESCRIPTION - PAIN TYPE
TYPE: ACUTE PAIN
TYPE: ACUTE PAIN

## 2019-09-29 ASSESSMENT — PAIN DESCRIPTION - LOCATION
LOCATION: HEAD
LOCATION: THROAT

## 2019-09-29 ASSESSMENT — PAIN DESCRIPTION - DESCRIPTORS: DESCRIPTORS: ACHING

## 2019-09-29 ASSESSMENT — PAIN DESCRIPTION - FREQUENCY: FREQUENCY: CONTINUOUS

## 2019-09-29 ASSESSMENT — PAIN DESCRIPTION - ORIENTATION: ORIENTATION: MID

## 2019-10-01 LAB
CULTURE: ABNORMAL
Lab: ABNORMAL
SPECIMEN: ABNORMAL
STREP A DIRECT SCREEN: NEGATIVE

## 2019-12-03 ENCOUNTER — HOSPITAL ENCOUNTER (OUTPATIENT)
Dept: SLEEP CENTER | Age: 59
Discharge: HOME | End: 2019-12-03
Payer: MEDICAID

## 2019-12-03 VITALS
OXYGEN SATURATION: 96 % | WEIGHT: 293 LBS | HEART RATE: 77 BPM | BODY MASS INDEX: 44.7 KG/M2 | DIASTOLIC BLOOD PRESSURE: 88 MMHG | SYSTOLIC BLOOD PRESSURE: 150 MMHG

## 2019-12-03 PROCEDURE — 99211 OFF/OP EST MAY X REQ PHY/QHP: CPT | Performed by: INTERNAL MEDICINE

## 2019-12-03 RX ORDER — DEXTROSE 4 G
TABLET,CHEWABLE ORAL
Refills: 0 | COMMUNITY
Start: 2019-09-24

## 2019-12-03 ASSESSMENT — SLEEP AND FATIGUE QUESTIONNAIRES
HOW LIKELY ARE YOU TO NOD OFF OR FALL ASLEEP WHILE SITTING INACTIVE IN A PUBLIC PLACE: 3
HOW LIKELY ARE YOU TO NOD OFF OR FALL ASLEEP WHILE SITTING AND READING: 3
HOW LIKELY ARE YOU TO NOD OFF OR FALL ASLEEP WHILE SITTING QUIETLY AFTER LUNCH WITHOUT ALCOHOL: 2
HOW LIKELY ARE YOU TO NOD OFF OR FALL ASLEEP IN A CAR, WHILE STOPPED FOR A FEW MINUTES IN TRAFFIC: 0
HOW LIKELY ARE YOU TO NOD OFF OR FALL ASLEEP WHEN YOU ARE A PASSENGER IN A CAR FOR AN HOUR WITHOUT A BREAK: 2
ESS TOTAL SCORE: 18
HOW LIKELY ARE YOU TO NOD OFF OR FALL ASLEEP WHILE WATCHING TV: 3
HOW LIKELY ARE YOU TO NOD OFF OR FALL ASLEEP WHILE LYING DOWN TO REST IN THE AFTERNOON WHEN CIRCUMSTANCES PERMIT: 3
HOW LIKELY ARE YOU TO NOD OFF OR FALL ASLEEP WHILE SITTING AND TALKING TO SOMEONE: 2

## 2019-12-04 ENCOUNTER — HOSPITAL ENCOUNTER (OUTPATIENT)
Dept: GENERAL RADIOLOGY | Age: 59
Discharge: HOME | End: 2019-12-04
Payer: MEDICAID

## 2019-12-04 ENCOUNTER — HOSPITAL ENCOUNTER (OUTPATIENT)
Age: 59
Discharge: HOME | End: 2019-12-04
Payer: MEDICAID

## 2019-12-04 DIAGNOSIS — M54.50 ACUTE RIGHT-SIDED LOW BACK PAIN WITHOUT SCIATICA: ICD-10-CM

## 2019-12-04 PROCEDURE — 72072 X-RAY EXAM THORAC SPINE 3VWS: CPT

## 2019-12-04 PROCEDURE — 72100 X-RAY EXAM L-S SPINE 2/3 VWS: CPT

## 2019-12-22 ENCOUNTER — APPOINTMENT (OUTPATIENT)
Dept: GENERAL RADIOLOGY | Age: 59
End: 2019-12-22
Payer: MEDICAID

## 2019-12-22 ENCOUNTER — APPOINTMENT (OUTPATIENT)
Dept: CT IMAGING | Age: 59
End: 2019-12-22
Payer: MEDICAID

## 2019-12-22 ENCOUNTER — HOSPITAL ENCOUNTER (EMERGENCY)
Age: 59
Discharge: HOME | End: 2019-12-22
Attending: EMERGENCY MEDICINE
Payer: MEDICAID

## 2019-12-22 VITALS
TEMPERATURE: 97.7 F | OXYGEN SATURATION: 97 % | WEIGHT: 280 LBS | DIASTOLIC BLOOD PRESSURE: 91 MMHG | HEART RATE: 59 BPM | RESPIRATION RATE: 16 BRPM | HEIGHT: 69 IN | SYSTOLIC BLOOD PRESSURE: 175 MMHG | BODY MASS INDEX: 41.47 KG/M2

## 2019-12-22 DIAGNOSIS — S52.126A CLOSED NONDISPLACED FRACTURE OF HEAD OF RADIUS, UNSPECIFIED LATERALITY, INITIAL ENCOUNTER: ICD-10-CM

## 2019-12-22 DIAGNOSIS — M79.606 PAIN OF LOWER EXTREMITY, UNSPECIFIED LATERALITY: ICD-10-CM
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
## 2019-12-22 DIAGNOSIS — M79.602 LEFT ARM PAIN: ICD-10-CM

## 2019-12-22 PROCEDURE — 72170 X-RAY EXAM OF PELVIS: CPT

## 2019-12-22 PROCEDURE — 73080 X-RAY EXAM OF ELBOW: CPT

## 2019-12-22 PROCEDURE — 71045 X-RAY EXAM CHEST 1 VIEW: CPT

## 2019-12-22 PROCEDURE — 4500000028 HC INTERMEDIATE PROCEDURE

## 2019-12-22 PROCEDURE — 73560 X-RAY EXAM OF KNEE 1 OR 2: CPT

## 2019-12-22 PROCEDURE — 73552 X-RAY EXAM OF FEMUR 2/>: CPT

## 2019-12-22 PROCEDURE — 70450 CT HEAD/BRAIN W/O DYE: CPT

## 2019-12-22 PROCEDURE — 99283 EMERGENCY DEPT VISIT LOW MDM: CPT

## 2019-12-22 PROCEDURE — 72125 CT NECK SPINE W/O DYE: CPT

## 2019-12-22 PROCEDURE — 6370000000 HC RX 637 (ALT 250 FOR IP): Performed by: EMERGENCY MEDICINE

## 2019-12-22 PROCEDURE — 72100 X-RAY EXAM L-S SPINE 2/3 VWS: CPT

## 2019-12-22 PROCEDURE — 73030 X-RAY EXAM OF SHOULDER: CPT

## 2019-12-22 PROCEDURE — 73060 X-RAY EXAM OF HUMERUS: CPT

## 2019-12-22 RX ORDER — ONDANSETRON 4 MG/1
4 TABLET, ORALLY DISINTEGRATING ORAL ONCE
Status: COMPLETED | OUTPATIENT
Start: 2019-12-22 | End: 2019-12-22

## 2019-12-22 RX ORDER — LIDOCAINE 50 MG/G
1 PATCH TOPICAL DAILY
Qty: 30 PATCH | Refills: 0 | Status: SHIPPED | OUTPATIENT
Start: 2019-12-22 | End: 2021-09-22

## 2019-12-22 RX ORDER — HYDROCODONE BITARTRATE AND ACETAMINOPHEN 5; 325 MG/1; MG/1
1 TABLET ORAL ONCE
Status: COMPLETED | OUTPATIENT
Start: 2019-12-22 | End: 2019-12-22

## 2019-12-22 RX ORDER — LIDOCAINE 4 G/G
1 PATCH TOPICAL DAILY
Status: DISCONTINUED | OUTPATIENT
Start: 2019-12-22 | End: 2019-12-22 | Stop reason: HOSPADM

## 2019-12-22 RX ORDER — DIAPER,BRIEF,INFANT-TODD,DISP
EACH MISCELLANEOUS ONCE
Status: COMPLETED | OUTPATIENT
Start: 2019-12-22 | End: 2019-12-22

## 2019-12-22 RX ORDER — POLYETHYLENE GLYCOL 3350 17 G/17G
17 POWDER, FOR SOLUTION ORAL DAILY PRN
Qty: 527 G | Refills: 1 | Status: SHIPPED | OUTPATIENT
Start: 2019-12-22 | End: 2020-01-21

## 2019-12-22 RX ORDER — HYDROCODONE BITARTRATE AND ACETAMINOPHEN 5; 325 MG/1; MG/1
1-2 TABLET ORAL EVERY 6 HOURS PRN
Qty: 10 TABLET | Refills: 0 | Status: SHIPPED | OUTPATIENT
Start: 2019-12-22 | End: 2019-12-24

## 2019-12-22 RX ORDER — ONDANSETRON 4 MG/1
4 TABLET, ORALLY DISINTEGRATING ORAL EVERY 8 HOURS PRN
Qty: 15 TABLET | Refills: 0 | Status: SHIPPED | OUTPATIENT
Start: 2019-12-22

## 2019-12-22 RX ADMIN — ONDANSETRON 4 MG: 4 TABLET, ORALLY DISINTEGRATING ORAL at 11:25

## 2019-12-22 RX ADMIN — BACITRACIN ZINC: 500 OINTMENT TOPICAL at 14:30

## 2019-12-22 RX ADMIN — HYDROCODONE BITARTRATE AND ACETAMINOPHEN 1 TABLET: 5; 325 TABLET ORAL at 11:25

## 2019-12-22 RX ADMIN — HYDROCODONE BITARTRATE AND ACETAMINOPHEN 1 TABLET: 5; 325 TABLET ORAL at 14:14

## 2019-12-22 ASSESSMENT — PAIN SCALES - GENERAL
PAINLEVEL_OUTOF10: 8
PAINLEVEL_OUTOF10: 5
PAINLEVEL_OUTOF10: 9

## 2019-12-22 ASSESSMENT — ENCOUNTER SYMPTOMS
BACK PAIN: 1
RESPIRATORY NEGATIVE: 1
GASTROINTESTINAL NEGATIVE: 1
EYES NEGATIVE: 1
ALLERGIC/IMMUNOLOGIC NEGATIVE: 1

## 2019-12-22 ASSESSMENT — PAIN DESCRIPTION - LOCATION: LOCATION: BACK;HIP

## 2019-12-22 ASSESSMENT — PAIN DESCRIPTION - PAIN TYPE: TYPE: ACUTE PAIN

## 2020-01-09 ENCOUNTER — OFFICE VISIT (OUTPATIENT)
Dept: ORTHOPEDIC SURGERY | Age: 60
End: 2020-01-09
Payer: MEDICAID

## 2020-01-09 VITALS — OXYGEN SATURATION: 97 % | HEART RATE: 78 BPM | RESPIRATION RATE: 15 BRPM

## 2020-01-09 PROCEDURE — 99203 OFFICE O/P NEW LOW 30 MIN: CPT | Performed by: ORTHOPAEDIC SURGERY

## 2020-01-09 PROCEDURE — 24650 CLTX RDL HEAD/NCK FX WO MNPJ: CPT | Performed by: ORTHOPAEDIC SURGERY

## 2020-01-09 NOTE — PROGRESS NOTES
Yes Jesus Cline MD   Pseudoephedrine-DM-GG 30- MG CAPS Take 1 tablet by mouth 2 times daily as needed (cough or congestion) 9/29/19  Yes Jesus Cline MD   amLODIPine-benazepril (LOTREL) 10-40 MG per capsule Take 1 capsule by mouth daily 8/27/19  Yes Katarzyna Camara MD   naproxen (NAPROSYN) 500 MG tablet Take 1 tablet by mouth 2 times daily 5/28/19  Yes Huy Cabrales PA-C   pravastatin (PRAVACHOL) 20 MG tablet take 1 tablet by mouth once daily 5/24/19  Yes JOSE Maradiaga CNP   linagliptin (TRADJENTA) 5 MG tablet Take 1 tablet by mouth daily 2/27/19  Yes Darnell Leone MD   metFORMIN (GLUCOPHAGE) 850 MG tablet Take 1 tablet by mouth 2 times daily (with meals) 2/26/19  Yes Darnell Leone MD   insulin lispro (HUMALOG) 100 UNIT/ML injection vial Inject 30 Units into the skin 3 times daily (before meals) 2/26/19  Yes Darnell Leone MD   guaiFENesin [facility]) 600 MG extended release tablet Take 1 tablet by mouth 2 times daily 2/26/19  Yes Darnell Leone MD   mometasone-formoterol [facility]) 100-5 MCG/ACT inhaler Inhale 2 puffs into the lungs 2 times daily 2/26/19  Yes Darnell Leone MD   albuterol (PROVENTIL) (2.5 MG/3ML) 0.083% nebulizer solution  2/21/19  Yes Historical Provider, MD   divalproex (DEPAKOTE) 250 MG DR tablet take 1 tablet by mouth twice a day 2/21/19  Yes Historical Provider, MD   acetaminophen (AMINOFEN) 325 MG tablet Take 2 tablets by mouth every 6 hours as needed for Pain 11/20/18  Yes JOSE Li CNP   amLODIPine-atorvastatatin (CADUET) 10-40 MG per tablet Take 1 tablet by mouth daily   Yes Historical Provider, MD   dicyclomine (BENTYL) 20 MG tablet Take 20 mg by mouth every 6 hours   Yes Historical Provider, MD HUSSEIN  (90 Base) MCG/ACT inhaler  2/27/18  Yes Historical Provider, MD BERMEO 0.01 % SOLN ophthalmic drops Place 1 drop into both eyes daily  1/22/18  Yes Historical Provider, MD   HYDROcodone-acetaminophen (NORCO) 5-325 MG per tablet 1 tablet 3 times daily as needed. 2/15/18  Yes Historical Provider, MD   theophylline (THEODUR) 300 MG extended release tablet 1 tablet daily 2/11/18  Yes Historical Provider, MD   pantoprazole (PROTONIX) 40 MG tablet Take 40 mg by mouth daily   Yes Historical Provider, MD   furosemide (LASIX) 20 MG tablet take 1/2 tablet every few weeks 1/16/18  Yes Historical Provider, MD   LYRICA 75 MG capsule take 1 capsule by mouth twice a day 1/18/18  Yes Historical Provider, MD   ibuprofen (ADVIL;MOTRIN) 600 MG tablet Take 1 tablet by mouth every 8 hours as needed for Pain 1/3/18  Yes Penny Navas PA-C   Misc. Devices (CANE) MISC 1 Device by Does not apply route daily as needed. 4/1/14  Yes Jamee Lloyd PA-C   DULoxetine (CYMBALTA) 60 MG capsule Take 60 mg by mouth daily.    Yes Historical Provider, MD   insulin glargine (LANTUS) 100 UNIT/ML injection Inject 50 Units into the skin nightly    Yes Historical Provider, MD       ALLERGIES  Allergies   Allergen Reactions    Codeine     Adhesive Tape Itching    Toradol [Ketorolac Tromethamine] Other (See Comments)     Headache for 5-6 days       SURGICAL HISTORY  Past Surgical History:   Procedure Laterality Date    TUBAL LIGATION         FAMILY HISTORY  Family History   Problem Relation Age of Onset    Cancer Mother     Diabetes Mother     Heart Disease Mother     Cancer Father     High Blood Pressure Father     Cancer Other     Cancer Other     Diabetes Sister     Heart Disease Sister     High Blood Pressure Brother     Depression Sister        SOCIAL HISTORY  Social History     Socioeconomic History    Marital status: Legally      Spouse name: Not on file    Number of children: Not on file    Years of education: Not on file    Highest education level: Not on file   Occupational History    Not on file   Social Needs    Financial resource strain: Not on file    Food insecurity:     Worry: Not on file     Inability: Not on file   Ayaan Aguilera degenerative changes.             ASSESSMENT     Patient Active Problem List   Diagnosis    Degenerative arthritis of thumb    Essential hypertension    Precordial pain    Type 2 diabetes mellitus without complication, without long-term current use of insulin (HCC)    Claudication (HCC)    WD-Wound of left leg, subsequent encounter    COPD exacerbation ([facility] [address]        61 y.o. female with left non-displaced radial head fracture  PLAN   - Activity: Weight bearing as tolerated left arm  - Brace: Sling when in public and wean as tolerated    - Follow up: prn     Electronically signed by: Dangelo Wilson MD, 1/9/2020 4:04 PM

## 2020-01-14 ENCOUNTER — HOSPITAL ENCOUNTER (OUTPATIENT)
Dept: SLEEP CENTER | Age: 60
Discharge: HOME | End: 2020-01-14
Payer: MEDICAID

## 2020-01-14 PROCEDURE — 9990000010 HC NO CHARGE VISIT: Performed by: INTERNAL MEDICINE

## 2020-01-14 NOTE — PROGRESS NOTES
Ramo Kimball MD, Shea Mckeon MD, Devorah Kruse MD, Natacha Rogel MD, [facility]      [address]   [address], [address] [facility]   [place]: [phone]  F: [phone]     Subjective:     Patient ID: Arely Arzate is a 61 y.o. female, referred to the sleep center for   Chief Complaint   Patient presents with    1 Month Follow-Up   .doing fair but nasal pillows are giving problems to pt    Referring physician:  penny parson    History:    Social History     Socioeconomic History    Marital status: Legally      Spouse name: Not on file    Number of children: Not on file    Years of education: Not on file    Highest education level: Not on file   Occupational History    Not on file   Social Needs    Financial resource strain: Not on file    Food insecurity:     Worry: Not on file     Inability: Not on file    Transportation needs:     Medical: Not on file     Non-medical: Not on file   Tobacco Use    Smoking status: Former Smoker    Smokeless tobacco: Never Used   Substance and Sexual Activity    Alcohol use: No    Drug use: No    Sexual activity: Not on file   Lifestyle    Physical activity:     Days per week: Not on file     Minutes per session: Not on file    Stress: Not on file   Relationships    Social connections:     Talks on phone: Not on file     Gets together: Not on file     Attends [employer] service: Not on file     Active member of club or organization: Not on file     Attends meetings of clubs or organizations: Not on file     Relationship status: Not on file    Intimate partner violence:     Fear of current or ex partner: Not on file     Emotionally abused: Not on file     Physically abused: Not on file     Forced sexual activity: Not on file   Other Topics Concern    Not on file   Social History Narrative    Not on file       Prior to Admission medications    Medication Sig Start Date End Date Taking? Authorizing Provider   RA ALCOHOL SWABS 70 % PADS use as directed three times a day 19  Yes Historical Provider, MD   amLODIPine-benazepril (LOTREL) 10-40 MG per capsule Take 1 capsule by mouth daily 19  Yes Jessenia Joe MD   naproxen (NAPROSYN) 500 MG tablet Take 1 tablet by mouth 2 times daily 19  Yes Kat Donnelly PA-C   pravastatin (PRAVACHOL) 20 MG tablet take 1 tablet by mouth once daily 19  Yes JOSE Sanz - CNP   metFORMIN (GLUCOPHAGE) 850 MG tablet Take 1 tablet by mouth 2 times daily (with meals) 19  Yes Santiago Bell MD   insulin lispro (HUMALOG) 100 UNIT/ML injection vial Inject 30 Units into the skin 3 times daily (before meals) 19  Yes Santiago Bell MD   guaiFENesin [facility]) 600 MG extended release tablet Take 1 tablet by mouth 2 times daily 19  Yes Santiago Bell MD   mometasone-formoterol [facility]) 100-5 MCG/ACT inhaler Inhale 2 puffs into the lungs 2 times daily 19  Yes Santiago Bell MD   albuterol (PROVENTIL) (2.5 MG/3ML) 0.083% nebulizer solution  19  Yes Historical Provider, MD   divalproex (DEPAKOTE) 250 MG DR tablet take 1 tablet by mouth twice a day 19  Yes Historical Provider, MD   amLODIPine-atorvastatatin (CADUET) 10-40 MG per tablet Take 1 tablet by mouth daily   Yes Historical Provider, MD   dicyclomine (BENTYL) 20 MG tablet Take 20 mg by mouth every 6 hours   Yes Historical Provider, MD   VENTOLIN  (90 Base) MCG/ACT inhaler  18  Yes Historical Provider, MD BERMEO 0.01 % SOLN ophthalmic drops Place 1 drop into both eyes daily  18  Yes Historical Provider, MD   HYDROcodone-acetaminophen (NORCO) 5-325 MG per tablet 1 tablet 3 times daily as needed.  2/15/18  Yes Historical Provider, MD   theophylline (THEODUR) 300 MG extended release tablet 1 tablet daily 18  Yes Historical Provider, MD   furosemide (LASIX) 20 MG tablet take 1/2 tablet every few weeks 18  Yes of insulin ([facility] [address] 02/12/2018    Degenerative arthritis of thumb 11/03/2015     Plan:        Sleep Study:     []  Sleep hygiene/ relaxation methods & CBTi principles review with patient     []  HST - Home Sleep Study   []  PSG - Overnight Diagnostic Polysomnogram     []  CPAP Titration    [] Split Night Study    [] BiLevel Titration    [] ASV - Auto-Servo Ventilation Titration       []  MSLT - Multiple Sleep Latency Test   []  MWT - Maintenance of Wakefulness Test    CPAP Therapy:     []  Patient to be seen for new mask fitting/desensitization   []  AutoPAP Titration    []  CPAP supplies and equipment at ________cmH2O    [x]  Continue same CPAP pressure   []  Change CPAP pressure to _______cm H2O   []  CPAP supplies only, no pressure change   []  Refer for an oral appliance       Medications:       [x]  Continue current medication    []  Add Medication:  ________________    Follow-Up:     []  No follow up required. Patient to return as needed. []  2 weeks   []  4 weeks   []  2 months   []  4 months   []  6 months   [x]  1 year for CPAP compliance evaluation. Patient to return sooner, as needed. []  Follow up after sleep study   []  Other: ____advised weight loss and sleep hygiene__________    No orders of the defined types were placed in this encounter.          Electronically signed by Morena Freire MD on 1/14/2020 at 11:12 AM

## 2020-01-21 ENCOUNTER — TELEPHONE (OUTPATIENT)
Dept: ORTHOPEDIC SURGERY | Age: 60
End: 2020-01-21

## 2020-01-21 NOTE — TELEPHONE ENCOUNTER
Patient calling with concerns you informed her to wear her sling for 2 weeks and that time is about up and her arm/ elbow has not improved at all. What is your next plan of care? Please advise.

## 2020-03-25 RX ORDER — AMLODIPINE BESYLATE AND BENAZEPRIL HYDROCHLORIDE 10; 40 MG/1; MG/1
1 CAPSULE ORAL DAILY
Qty: 30 CAPSULE | Refills: 0 | Status: SHIPPED | OUTPATIENT
Start: 2020-03-25 | End: 2020-04-22

## 2020-03-25 NOTE — TELEPHONE ENCOUNTER
90 day refill of Lotrel 10-40mg [facility] to [address] [place] [address] [facility]. No answer. Left voice message for patient to call the office.

## 2020-04-14 ENCOUNTER — TELEMEDICINE (OUTPATIENT)
Dept: CARDIOLOGY CLINIC | Age: 60
End: 2020-04-14
Payer: MEDICAID

## 2020-04-14 VITALS — WEIGHT: 293 LBS | BODY MASS INDEX: 44.41 KG/M2 | HEIGHT: 68 IN

## 2020-04-14 PROCEDURE — 99213 OFFICE O/P EST LOW 20 MIN: CPT | Performed by: INTERNAL MEDICINE

## 2020-04-14 NOTE — PROGRESS NOTES
mometasone-formoterol (DULERA) 100-5 MCG/ACT inhaler Inhale 2 puffs into the lungs 2 times daily 13 g 3    albuterol (PROVENTIL) (2.5 MG/3ML) 0.083% nebulizer solution   0    divalproex (DEPAKOTE) 250 MG DR tablet take 1 tablet by mouth twice a day  0    dicyclomine (BENTYL) 20 MG tablet Take 20 mg by mouth every 6 hours      VENTOLIN  (90 Base) MCG/ACT inhaler   0    LUMIGAN 0.01 % SOLN ophthalmic drops Place 1 drop into both eyes daily   1    HYDROcodone-acetaminophen (NORCO) 5-325 MG per tablet 1 tablet 3 times daily as needed. 0    theophylline (THEODUR) 300 MG extended release tablet 1 tablet daily  0    pantoprazole (PROTONIX) 40 MG tablet Take 40 mg by mouth daily      furosemide (LASIX) 20 MG tablet take 1/2 tablet every few weeks  0    LYRICA 75 MG capsule take 1 capsule by mouth twice a day  0    Misc. Devices (CANE) MISC 1 Device by Does not apply route daily as needed. 1 each 0    DULoxetine (CYMBALTA) 60 MG capsule Take 60 mg by mouth daily.  insulin glargine (LANTUS) 100 UNIT/ML injection Inject 50 Units into the skin nightly       lidocaine (LIDODERM) 5 % Place 1 patch onto the skin daily 12 hours on, 12 hours off. (Patient not taking: Reported on 1/14/2020) 30 patch 0    ibuprofen (ADVIL;MOTRIN) 600 MG tablet Take 1 tablet by mouth every 6 hours as needed for Pain (Patient not taking: Reported on 1/14/2020) 30 tablet 0    Pseudoephedrine-DM-GG 30- MG CAPS Take 1 tablet by mouth 2 times daily as needed (cough or congestion) (Patient not taking: Reported on 1/14/2020) 10 capsule 0    acetaminophen (AMINOFEN) 325 MG tablet Take 2 tablets by mouth every 6 hours as needed for Pain (Patient not taking: Reported on 1/14/2020) 60 tablet 0    ibuprofen (ADVIL;MOTRIN) 600 MG tablet Take 1 tablet by mouth every 8 hours as needed for Pain (Patient not taking: Reported on 4/14/2020) 30 tablet 0     No current facility-administered medications for this visit.         Allergies: hives    Objective:      Physical Exam:  Ht 5' 8\" (1.727 m)   Wt 295 lb (133.8 kg)   BMI 44.85 kg/m²   Wt Readings from Last 3 Encounters:   04/14/20 295 lb (133.8 kg)   12/22/19 280 lb (127 kg)   12/03/19 294 lb (133.4 kg)     Body mass index is 44.85 kg/m². There were no vitals filed for this visit. PHYSICAL EXAMINATION:    Vital Signs: (As obtained by patient/caregiver or practitioner observation)    Blood pressure-  Heart rate-    Respiratory rate-    Temperature-  Pulse oximetry-     Constitutional: [x] Appears well-developed and well-nourished [x] No apparent distress      [] Abnormal-   Mental status  [x] Alert and awake  [x] Oriented to person/place/time [x]Able to follow commands      Eyes:  EOM    [x]  Normal  [] Abnormal-  Sclera  [x]  Normal  [] Abnormal -         Discharge [x]  None visible  [] Abnormal -    HENT:   [x] Normocephalic, atraumatic. [] Abnormal   [x] Mouth/Throat: Mucous membranes are moist.     External Ears [x] Normal  [] Abnormal-     Neck: [x] No visualized mass     Pulmonary/Chest: [x] Respiratory effort normal.  [x] No visualized signs of difficulty breathing or respiratory distress        [] Abnormal-      Musculoskeletal:   [x] Normal gait with no signs of ataxia         [x] Normal range of motion of neck        [] Abnormal-       Neurological:        [x] No Facial Asymmetry (Cranial nerve 7 motor function) (limited exam to video visit)          [x] No gaze palsy        [] Abnormal-         Skin:        [x] No significant exanthematous lesions or discoloration noted on facial skin         [] Abnormal-            Psychiatric:       [x] Normal Affect [x] No Hallucinations        [] Abnormal-     Other pertinent observable physical exam findings-     Due to this being a TeleHealth encounter, evaluation of the following organ systems is limited: Vitals/Constitutional/EENT/Resp/CV/GI//MS/Neuro/Skin/Heme-Lymph-Imm.           General Appearance:  No distress, conversant  Constitutional:  Well developed, Well nourished, No acute distress, Non-toxic appearance. HENT:  Normocephalic, Atraumatic, Bilateral external ears normal, Oropharynx moist, No oral exudates, Nose normal. Neck- Normal range of motion, No tenderness, Supple, No stridor,no apical-carotid delay  Eyes:  PERRL, EOMI, Conjunctiva normal, No discharge. Respiratory: No auscultation use of accessory muscles,   Cardiovascular: (PMI) auscultation was not done no signs of ankle edema, or volume overload, No evidence of JVD  GI: No obvious abnormality  Musculoskeletal:  No edema, no tenderness, no deformities. Back- no tenderness  Integument:  Well hydrated, no rash   Lymphatic:  No lymphadenopathy noted   Neurologic:  Alert & oriented x 3, CN 2-12 normal, normal motor function, normal sensory function, no focal deficits noted   Psychiatric:  Speech and behavior appropriate       Medical decision making and Data review:  DATA:  Lab Results   Component Value Date    TROPONINT <0.010 02/22/2019     BNP:  No results found for: PROBNP  PT/INR:  No results found for: PTINR  Lab Results   Component Value Date    LABA1C 10.2 (H) 12/17/2018    LABA1C 7.8 (H) 12/13/2017     Lab Results   Component Value Date    CHOL 105 12/17/2018    TRIG 113 12/17/2018    HDL 39 (L) 12/17/2018    LDLCALC 68 12/09/2016    LDLDIRECT 55 12/17/2018     Lab Results   Component Value Date    ALT 80 (H) 02/22/2019     (H) 02/22/2019     TSH: No results found for: TSH  Stress test 2/22/18  Summary    Supervising physician Dr. Willie Jacinto portion of stress test is negative for ischemia by diagnostic criteria.    Normal EF 45 % with normal ventricular contractility.    No infarct or ischemia noted.    Normal stress myocardial perfusion.    This is a normal study.       Echo 2/22/18  Summary   Normal left ventricle structure and function.   Ejection fraction is visually estimated at 50-60%.    Grade I diastolic dysfunction.   No significant valvular disease noted.   No evidence of pericardial effusion.     Doppler 3/26/18          No evidence of significant occlusive arterial disease.    Bilateral VERONICA's show normal arterial flow.             All labs, medications and tests reviewed by myself including data and history from outside source , patient and available family . Assessment & Plan:      1. Essential hypertension    2. Precordial pain    3. Type 2 diabetes mellitus without complication, without long-term current use of insulin (HCC)    4. Claudication ([address]       . Precordial pain  Her last stress test in February 2018 showed no ischemia. She thinks her chest pressure / pain occurs with stress full situation, unfortunately high blood pressure will also cause chest pain, The pain does not  radiate , he notices more shortness of breath when she is not taking her water pill    Essential hypertension  \"bp always runs high\" will increase lotrel to 10/40 mg daily She was  advised to bring her meds to clarify what she is taking but she did she did not bring them  , I am concerned that she is taking amlodipine alone and the in combination pill as well she does not seem to know what medicine she is really taking    Claudication [facility])  Arterial Doppler in March 2018 did not show any abnormality, it may be neuropathy     Dyslipidemia :  Latanya Holland had lab work recently,  Lipid profile was reviewed with patient. Ideally she should be on statins due to diabetes. Will start small dose pravastatin     Counseled extensively and medication compliance urged. We discussed that for the  prevention of ASCVD our  goal is aggressive risk modification. Patient is encouraged to exercise even a brisk walk for 30 minutes  at least 3 to 4 times a week   Various goals were discussed and questions answered. Continue current medications. Appropriate prescriptions are addressed and refills ordered. Questions answered and patient verbalizes understanding.   Call for any problems, questions, or concerns. Continue all other medications of all above medical condition listed as is. Return in about 6 months (around 10/14/2020). Please note this report has been partially produced using speech recognition software and may contain errors related to that system including errors in grammar, punctuation, and spelling, as well as words and phrases that may be inappropriate. If there are any questions or concerns please feel free to contact the dictating provider for clarification.   I confirm that this visit was completed in a telehealth setting ,using synchronous audiovisual technology for real time patient interaction . The patient identity with name and date of birth was confirmed . This evaluation of patient was done by telehealth in the setting of [address] , which precluded assurance of safe in person visit at the time of service. The patient consented to and accepts potential risks associated with telemedical evaluation and care was taken to assess aaron presence of any medical issues that would be more  appropriate for expedited in -person care. Pursuant to the emergency declaration under the [address] [place] [address], [facility] waiver authority and the [employer] and [employer] Act, this Virtual  Visit was conducted, with patient's consent, to reduce the patient's risk of exposure to COVID-19 and provide continuity of care for an established patient. Services were provided through a video synchronous discussion virtually to substitute for in-person clinic visit. I Affirm this is a Patient Initiated Episode with an Established Patient who has not had a related appointment within my department in the past 7 days or scheduled within the next 24 hours.     Total Time: minutes: 11-20 minutes

## 2020-04-22 RX ORDER — AMLODIPINE BESYLATE AND BENAZEPRIL HYDROCHLORIDE 10; 40 MG/1; MG/1
1 CAPSULE ORAL DAILY
Qty: 30 CAPSULE | Refills: 5 | Status: SHIPPED | OUTPATIENT
Start: 2020-04-22 | End: 2020-09-28

## 2020-05-15 ENCOUNTER — OFFICE VISIT (OUTPATIENT)
Dept: PRIMARY CARE CLINIC | Age: 60
End: 2020-05-15
Payer: MEDICAID

## 2020-05-15 ENCOUNTER — HOSPITAL ENCOUNTER (OUTPATIENT)
Age: 60
Setting detail: SPECIMEN
Discharge: HOME | End: 2020-05-15
Payer: MEDICAID

## 2020-05-15 VITALS — OXYGEN SATURATION: 97 % | HEART RATE: 70 BPM | TEMPERATURE: 97.9 F

## 2020-05-15 PROCEDURE — U0002 COVID-19 LAB TEST NON-CDC: HCPCS

## 2020-05-15 PROCEDURE — 99213 OFFICE O/P EST LOW 20 MIN: CPT | Performed by: FAMILY MEDICINE

## 2020-05-18 LAB
SARS-COV-2: NOT DETECTED
SOURCE: NORMAL

## 2020-09-28 RX ORDER — AMLODIPINE BESYLATE AND BENAZEPRIL HYDROCHLORIDE 10; 40 MG/1; MG/1
1 CAPSULE ORAL DAILY
Qty: 30 CAPSULE | Refills: 5 | Status: SHIPPED | OUTPATIENT
Start: 2020-09-28 | End: 2021-03-11

## 2020-09-28 NOTE — TELEPHONE ENCOUNTER
Patient called there is a request   For her Amlodipine she dropped in the   Street and she is out of this medication   There is a request from Pharmacy   As well

## 2020-11-05 ENCOUNTER — HOSPITAL ENCOUNTER (EMERGENCY)
Age: 60
Discharge: HOME | End: 2020-11-06
Attending: EMERGENCY MEDICINE
Payer: MEDICAID

## 2020-11-05 ENCOUNTER — APPOINTMENT (OUTPATIENT)
Dept: CT IMAGING | Age: 60
End: 2020-11-05
Payer: MEDICAID

## 2020-11-05 LAB
ALBUMIN SERPL-MCNC: 3.7 GM/DL (ref 3.4–5)
ALP BLD-CCNC: 100 IU/L (ref 40–129)
ALT SERPL-CCNC: 47 U/L (ref 10–40)
ANION GAP SERPL CALCULATED.3IONS-SCNC: 14 MMOL/L (ref 4–16)
AST SERPL-CCNC: 78 IU/L (ref 15–37)
BACTERIA: NEGATIVE /HPF
BASOPHILS ABSOLUTE: 0 K/CU MM
BASOPHILS RELATIVE PERCENT: 0.3 % (ref 0–1)
BILIRUB SERPL-MCNC: 0.2 MG/DL (ref 0–1)
BILIRUBIN URINE: NEGATIVE MG/DL
BLOOD, URINE: NEGATIVE
BUN BLDV-MCNC: 10 MG/DL (ref 6–23)
CALCIUM SERPL-MCNC: 9.6 MG/DL (ref 8.3–10.6)
CHLORIDE BLD-SCNC: 96 MMOL/L (ref 99–110)
CLARITY: ABNORMAL
CO2: 24 MMOL/L (ref 21–32)
COLOR: YELLOW
CREAT SERPL-MCNC: 0.8 MG/DL (ref 0.6–1.1)
DIFFERENTIAL TYPE: ABNORMAL
EOSINOPHILS ABSOLUTE: 0.1 K/CU MM
EOSINOPHILS RELATIVE PERCENT: 1.3 % (ref 0–3)
GFR AFRICAN AMERICAN: >60 ML/MIN/1.73M2
GFR NON-AFRICAN AMERICAN: >60 ML/MIN/1.73M2
GLUCOSE BLD-MCNC: 325 MG/DL (ref 70–99)
GLUCOSE, URINE: >500 MG/DL
HCT VFR BLD CALC: 43.7 % (ref 37–47)
HEMOGLOBIN: 13.9 GM/DL (ref 12.5–16)
IMMATURE NEUTROPHIL %: 0.4 % (ref 0–0.43)
KETONES, URINE: NEGATIVE MG/DL
LACTATE: 2.1 MMOL/L (ref 0.4–2)
LEUKOCYTE ESTERASE, URINE: ABNORMAL
LIPASE: 28 IU/L (ref 13–60)
LYMPHOCYTES ABSOLUTE: 1.8 K/CU MM
LYMPHOCYTES RELATIVE PERCENT: 23.1 % (ref 24–44)
MCH RBC QN AUTO: 27.2 PG (ref 27–31)
MCHC RBC AUTO-ENTMCNC: 31.8 % (ref 32–36)
MCV RBC AUTO: 85.5 FL (ref 78–100)
MONOCYTES ABSOLUTE: 0.5 K/CU MM
MONOCYTES RELATIVE PERCENT: 5.8 % (ref 0–4)
MUCUS: ABNORMAL HPF
NITRITE URINE, QUANTITATIVE: NEGATIVE
NUCLEATED RBC %: 0 %
PDW BLD-RTO: 13.5 % (ref 11.7–14.9)
PH, URINE: 5 (ref 5–8)
PLATELET # BLD: 253 K/CU MM (ref 140–440)
PMV BLD AUTO: 10.7 FL (ref 7.5–11.1)
POTASSIUM SERPL-SCNC: 3.9 MMOL/L (ref 3.5–5.1)
PROTEIN UA: 100 MG/DL
RBC # BLD: 5.11 M/CU MM (ref 4.2–5.4)
RBC URINE: 9 /HPF (ref 0–6)
SEGMENTED NEUTROPHILS ABSOLUTE COUNT: 5.5 K/CU MM
SEGMENTED NEUTROPHILS RELATIVE PERCENT: 69.1 % (ref 36–66)
SODIUM BLD-SCNC: 134 MMOL/L (ref 135–145)
SPECIFIC GRAVITY UA: 1.02 (ref 1–1.03)
SQUAMOUS EPITHELIAL: 1 /HPF
T4 FREE: 1.11 NG/DL (ref 0.9–1.8)
TOTAL IMMATURE NEUTOROPHIL: 0.03 K/CU MM
TOTAL NUCLEATED RBC: 0 K/CU MM
TOTAL PROTEIN: 7 GM/DL (ref 6.4–8.2)
TRICHOMONAS: ABNORMAL /HPF
TSH HIGH SENSITIVITY: 0.83 UIU/ML (ref 0.27–4.2)
UROBILINOGEN, URINE: NORMAL MG/DL (ref 0.2–1)
WBC # BLD: 7.9 K/CU MM (ref 4–10.5)
WBC UA: 81 /HPF (ref 0–5)

## 2020-11-05 PROCEDURE — 6370000000 HC RX 637 (ALT 250 FOR IP): Performed by: EMERGENCY MEDICINE

## 2020-11-05 PROCEDURE — 96376 TX/PRO/DX INJ SAME DRUG ADON: CPT

## 2020-11-05 PROCEDURE — 84443 ASSAY THYROID STIM HORMONE: CPT

## 2020-11-05 PROCEDURE — 93005 ELECTROCARDIOGRAM TRACING: CPT | Performed by: EMERGENCY MEDICINE

## 2020-11-05 PROCEDURE — 80053 COMPREHEN METABOLIC PANEL: CPT

## 2020-11-05 PROCEDURE — 85025 COMPLETE CBC W/AUTO DIFF WBC: CPT

## 2020-11-05 PROCEDURE — 87040 BLOOD CULTURE FOR BACTERIA: CPT

## 2020-11-05 PROCEDURE — 87077 CULTURE AEROBIC IDENTIFY: CPT

## 2020-11-05 PROCEDURE — 87086 URINE CULTURE/COLONY COUNT: CPT

## 2020-11-05 PROCEDURE — 96365 THER/PROPH/DIAG IV INF INIT: CPT

## 2020-11-05 PROCEDURE — 83605 ASSAY OF LACTIC ACID: CPT

## 2020-11-05 PROCEDURE — 99284 EMERGENCY DEPT VISIT MOD MDM: CPT

## 2020-11-05 PROCEDURE — 87186 SC STD MICRODIL/AGAR DIL: CPT

## 2020-11-05 PROCEDURE — 83690 ASSAY OF LIPASE: CPT

## 2020-11-05 PROCEDURE — 96375 TX/PRO/DX INJ NEW DRUG ADDON: CPT

## 2020-11-05 PROCEDURE — 74176 CT ABD & PELVIS W/O CONTRAST: CPT

## 2020-11-05 PROCEDURE — 2580000003 HC RX 258: Performed by: EMERGENCY MEDICINE

## 2020-11-05 PROCEDURE — 96361 HYDRATE IV INFUSION ADD-ON: CPT

## 2020-11-05 PROCEDURE — 84439 ASSAY OF FREE THYROXINE: CPT

## 2020-11-05 PROCEDURE — 81001 URINALYSIS AUTO W/SCOPE: CPT

## 2020-11-05 PROCEDURE — 6360000002 HC RX W HCPCS: Performed by: EMERGENCY MEDICINE

## 2020-11-05 RX ORDER — MORPHINE SULFATE 4 MG/ML
2 INJECTION, SOLUTION INTRAMUSCULAR; INTRAVENOUS ONCE
Status: COMPLETED | OUTPATIENT
Start: 2020-11-05 | End: 2020-11-05

## 2020-11-05 RX ORDER — CEPHALEXIN 500 MG/1
500 CAPSULE ORAL 3 TIMES DAILY
Qty: 21 CAPSULE | Refills: 0 | Status: SHIPPED | OUTPATIENT
Start: 2020-11-05 | End: 2020-11-12

## 2020-11-05 RX ORDER — SODIUM CHLORIDE 0.9 % (FLUSH) 0.9 %
10 SYRINGE (ML) INJECTION 2 TIMES DAILY
Status: DISCONTINUED | OUTPATIENT
Start: 2020-11-05 | End: 2020-11-06 | Stop reason: HOSPADM

## 2020-11-05 RX ORDER — 0.9 % SODIUM CHLORIDE 0.9 %
1000 INTRAVENOUS SOLUTION INTRAVENOUS ONCE
Status: COMPLETED | OUTPATIENT
Start: 2020-11-05 | End: 2020-11-05

## 2020-11-05 RX ADMIN — CEFTRIAXONE 1 G: 1 INJECTION, POWDER, FOR SOLUTION INTRAMUSCULAR; INTRAVENOUS at 21:07

## 2020-11-05 RX ADMIN — SODIUM CHLORIDE 1000 ML: 9 INJECTION, SOLUTION INTRAVENOUS at 21:08

## 2020-11-05 RX ADMIN — HYOSCYAMINE SULFATE 125 MCG: 0.12 TABLET ORAL at 19:25

## 2020-11-05 RX ADMIN — MORPHINE SULFATE 2 MG: 4 INJECTION, SOLUTION INTRAMUSCULAR; INTRAVENOUS at 22:37

## 2020-11-05 RX ADMIN — MORPHINE SULFATE 2 MG: 4 INJECTION, SOLUTION INTRAMUSCULAR; INTRAVENOUS at 19:00

## 2020-11-05 ASSESSMENT — PAIN SCALES - GENERAL
PAINLEVEL_OUTOF10: 8
PAINLEVEL_OUTOF10: 8
PAINLEVEL_OUTOF10: 9

## 2020-11-05 ASSESSMENT — ENCOUNTER SYMPTOMS
BACK PAIN: 1
EYE REDNESS: 0
COUGH: 0
CONSTIPATION: 0
SHORTNESS OF BREATH: 0
SORE THROAT: 0
NAUSEA: 0
VOMITING: 0
RHINORRHEA: 0
ABDOMINAL PAIN: 1
DIARRHEA: 1

## 2020-11-05 ASSESSMENT — PAIN DESCRIPTION - LOCATION: LOCATION: BACK;ABDOMEN

## 2020-11-05 ASSESSMENT — PAIN DESCRIPTION - PAIN TYPE: TYPE: ACUTE PAIN

## 2020-11-05 ASSESSMENT — PAIN DESCRIPTION - DESCRIPTORS: DESCRIPTORS: STABBING

## 2020-11-05 NOTE — ED PROVIDER NOTES
Triage Chief Complaint:   Abdominal Pain and Back Pain    [place]:  Carl Ramirez is a 61 y.o. female that presents with right lower back pain that radiates into her right lower quadrant has been going on for the last few days. She states the pain waxes and wanes but does not completely go away. She has not had any nausea or vomiting. No dysuria or hematuria. She does feel if she is urinating frequently but states she is also drinking a large amount of water. She has been taking Tylenol for the pain. She denies any fevers. She has intermittently had some diarrhea but states she has that chronically as she has a history of IBS. She has had no previous abdominal surgeries. No known injuries. ROS:   Review of Systems   Constitutional: Negative for chills and fever. HENT: Negative for congestion, rhinorrhea and sore throat. Eyes: Negative for redness and visual disturbance. Respiratory: Negative for cough and shortness of breath. Cardiovascular: Negative for chest pain and leg swelling. Gastrointestinal: Positive for abdominal pain and diarrhea (as in HPI). Negative for constipation, nausea and vomiting. Genitourinary: Positive for flank pain (right) and frequency. Negative for dysuria and hematuria. Musculoskeletal: Positive for back pain (right lower back). Negative for arthralgias. Skin: Negative for rash and wound. Neurological: Negative for syncope and headaches. Psychiatric/Behavioral: Negative. Negative for hallucinations and suicidal ideas. Past Medical History:   Diagnosis Date    Arthritis     Asthma     Bipolar 1 disorder ([facility]     COPD (chronic obstructive pulmonary disease) ([facility]     Depression     Diabetes mellitus (HCC)     Edema     Glaucoma     History of Doppler ultrasound 03/26/2018    Arterial-bilateral VERONICA's show normal arterial flow. No signif occlusive arterial disease.     History of nuclear stress test 02/22/2018    cardiolite-EF45%,normal    Hx of Doppler echocardiogram 02/22/2018    EF50-60%,no valvular disease    Hyperlipidemia     Hypertension     IBS (irritable bowel syndrome)      Past Surgical History:   Procedure Laterality Date    TUBAL LIGATION       Family History   Problem Relation Age of Onset    Cancer Mother     Diabetes Mother     Heart Disease Mother     Cancer Father     High Blood Pressure Father     Cancer Other     Cancer Other     Diabetes Sister     Heart Disease Sister     High Blood Pressure Brother     Depression Sister      Social History     Socioeconomic History    Marital status: Legally      Spouse name: Not on file    Number of children: Not on file    Years of education: Not on file    Highest education level: Not on file   Occupational History    Not on file   Social Needs    Financial resource strain: Not on file    Food insecurity     Worry: Not on file     Inability: Not on file    Transportation needs     Medical: Not on file     Non-medical: Not on file   Tobacco Use    Smoking status: Former Smoker    Smokeless tobacco: Never Used   Substance and Sexual Activity    Alcohol use: No    Drug use: No    Sexual activity: Not on file   Lifestyle    Physical activity     Days per week: Not on file     Minutes per session: Not on file    Stress: Not on file   Relationships    Social connections     Talks on phone: Not on file     Gets together: Not on file     Attends Buddhism service: Not on file     Active member of club or organization: Not on file     Attends meetings of clubs or organizations: Not on file     Relationship status: Not on file    Intimate partner violence     Fear of current or ex partner: Not on file     Emotionally abused: Not on file     Physically abused: Not on file     Forced sexual activity: Not on file   Other Topics Concern    Not on file   Social History Narrative    Not on file     Current Facility-Administered Medications   Medication Dose Route Frequency Provider Last Rate Last Dose    iopamidol (ISOVUE-370) 76 % injection 80 mL  80 mL Intravenous ONCE PRN Kyle Grimes MD        sodium chloride flush 0.9 % injection 10 mL  10 mL Intravenous BID Kyle Grimes MD         Current Outpatient Medications   Medication Sig Dispense Refill    cephALEXin (KEFLEX) 500 MG capsule Take 1 capsule by mouth 3 times daily for 7 days 21 capsule 0    amLODIPine-benazepril (LOTREL) 10-40 MG per capsule Take 1 capsule by mouth daily 30 capsule 5    ondansetron (ZOFRAN ODT) 4 MG disintegrating tablet Take 1 tablet by mouth every 8 hours as needed for Nausea 15 tablet 0    lidocaine (LIDODERM) 5 % Place 1 patch onto the skin daily 12 hours on, 12 hours off.  (Patient not taking: Reported on 1/14/2020) 30 patch 0    RA ALCOHOL SWABS 70 % PADS use as directed three times a day  0    ibuprofen (ADVIL;MOTRIN) 600 MG tablet Take 1 tablet by mouth every 6 hours as needed for Pain (Patient not taking: Reported on 1/14/2020) 30 tablet 0    Pseudoephedrine-DM-GG 30- MG CAPS Take 1 tablet by mouth 2 times daily as needed (cough or congestion) (Patient not taking: Reported on 1/14/2020) 10 capsule 0    naproxen (NAPROSYN) 500 MG tablet Take 1 tablet by mouth 2 times daily 60 tablet 0    pravastatin (PRAVACHOL) 20 MG tablet take 1 tablet by mouth once daily 30 tablet 5    linagliptin (TRADJENTA) 5 MG tablet Take 1 tablet by mouth daily 30 tablet 3    metFORMIN (GLUCOPHAGE) 850 MG tablet Take 1 tablet by mouth 2 times daily (with meals) 60 tablet 3    insulin lispro (HUMALOG) 100 UNIT/ML injection vial Inject 30 Units into the skin 3 times daily (before meals) 1 vial 3    guaiFENesin (MUCINEX) 600 MG extended release tablet Take 1 tablet by mouth 2 times daily 20 tablet 0    mometasone-formoterol (DULERA) 100-5 MCG/ACT inhaler Inhale 2 puffs into the lungs 2 times daily 13 g 3    albuterol (PROVENTIL) (2.5 MG/3ML) 0.083% nebulizer solution   0    divalproex (DEPAKOTE) 250 MG DR tablet take 1 tablet by mouth twice a day  0    acetaminophen (AMINOFEN) 325 MG tablet Take 2 tablets by mouth every 6 hours as needed for Pain (Patient not taking: Reported on 1/14/2020) 60 tablet 0    dicyclomine (BENTYL) 20 MG tablet Take 20 mg by mouth every 6 hours      VENTOLIN  (90 Base) MCG/ACT inhaler   0    LUMIGAN 0.01 % SOLN ophthalmic drops Place 1 drop into both eyes daily   1    HYDROcodone-acetaminophen (NORCO) 5-325 MG per tablet 1 tablet 3 times daily as needed. 0    theophylline (THEODUR) 300 MG extended release tablet 1 tablet daily  0    pantoprazole (PROTONIX) 40 MG tablet Take 40 mg by mouth daily      furosemide (LASIX) 20 MG tablet take 1/2 tablet every few weeks  0    LYRICA 75 MG capsule take 1 capsule by mouth twice a day  0    ibuprofen (ADVIL;MOTRIN) 600 MG tablet Take 1 tablet by mouth every 8 hours as needed for Pain (Patient not taking: Reported on 4/14/2020) 30 tablet 0    Misc. Devices (CANE) MISC 1 Device by Does not apply route daily as needed. 1 each 0    DULoxetine (CYMBALTA) 60 MG capsule Take 60 mg by mouth daily.  insulin glargine (LANTUS) 100 UNIT/ML injection Inject 50 Units into the skin nightly        Allergies   Allergen Reactions    Codeine     Adhesive Tape Itching    Toradol [Ketorolac Tromethamine] Other (See Comments)     Headache for 5-6 days       Nursing Notes Reviewed     Physical Exam:   ED Triage Vitals [11/05/20 1720]   Enc Vitals Group      BP (!) 146/80      Pulse 94      Resp 19      Temp 98.2 °F (36.8 °C)      Temp Source Oral      SpO2 96 %      Weight 293 lb (132.9 kg)      Height 5' 8\" (1.727 m)      Head Circumference       Peak Flow       Pain Score       Pain Loc       Pain Edu? Excl. in [address]?       /79   Pulse 118   Temp 98.2 °F (36.8 °C) (Oral)   Resp 22   Ht 5' 8\" (1.727 m)   Wt 293 lb (132.9 kg)   SpO2 94%   BMI 44.55 kg/m²   My pulse ox interpretation is - normal  Physical Exam  Constitutional:       General: She is not in acute distress. Appearance: She is well-developed. She is not toxic-appearing or diaphoretic. Comments: Appears to be in pain     HENT:      Head: Normocephalic and atraumatic. Eyes:      General:         Right eye: No discharge. Left eye: No discharge. Conjunctiva/sclera: Conjunctivae normal.   Cardiovascular:      Rate and Rhythm: Normal rate and regular rhythm. Pulmonary:      Effort: Pulmonary effort is normal. No respiratory distress. Breath sounds: Normal breath sounds. No wheezing. Abdominal:      General: There is no distension. Palpations: Abdomen is soft. Tenderness: There is abdominal tenderness (mild in RLQ). There is no guarding or rebound. Musculoskeletal: Normal range of motion. General: No swelling or signs of injury. Lumbar back: She exhibits tenderness. She exhibits no bony tenderness. Back:    Skin:     General: Skin is warm and dry. Neurological:      General: No focal deficit present. Mental Status: She is alert. Cranial Nerves: No cranial nerve deficit.    Psychiatric:         Mood and Affect: Mood normal.         Behavior: Behavior normal.         I have reviewed and interpreted all of the currently available lab results from this visit (if applicable):  Results for orders placed or performed during the hospital encounter of 11/05/20   CBC with Auto Diff   Result Value Ref Range    WBC 7.9 4.0 - 10.5 K/CU MM    RBC 5.11 4.2 - 5.4 M/CU MM    Hemoglobin 13.9 12.5 - 16.0 GM/DL    Hematocrit 43.7 37 - 47 %    MCV 85.5 78 - 100 FL    MCH 27.2 27 - 31 PG    MCHC 31.8 (L) 32.0 - 36.0 %    RDW 13.5 11.7 - 14.9 %    Platelets 631 581 - 034 K/CU MM    MPV 10.7 7.5 - 11.1 FL    Differential Type AUTOMATED DIFFERENTIAL     Segs Relative 69.1 (H) 36 - 66 %    Lymphocytes % 23.1 (L) 24 - 44 %    Monocytes % 5.8 (H) 0 - 4 %    Eosinophils % 1.3 0 - 3 %    Basophils % 0.3 0 - 1 % Segs Absolute 5.5 K/CU MM    Lymphocytes Absolute 1.8 K/CU MM    Monocytes Absolute 0.5 K/CU MM    Eosinophils Absolute 0.1 K/CU MM    Basophils Absolute 0.0 K/CU MM    Nucleated RBC % 0.0 %    Total Nucleated RBC 0.0 K/CU MM    Total Immature Neutrophil 0.03 K/CU MM    Immature Neutrophil % 0.4 0 - 0.43 %   CMP   Result Value Ref Range    Sodium 134 (L) 135 - 145 MMOL/L    Potassium 3.9 3.5 - 5.1 MMOL/L    Chloride 96 (L) 99 - 110 mMol/L    CO2 24 21 - 32 MMOL/L    BUN 10 6 - 23 MG/DL    CREATININE 0.8 0.6 - 1.1 MG/DL    Glucose 325 (H) 70 - 99 MG/DL    Calcium 9.6 8.3 - 10.6 MG/DL    Alb 3.7 3.4 - 5.0 GM/DL    Total Protein 7.0 6.4 - 8.2 GM/DL    Total Bilirubin 0.2 0.0 - 1.0 MG/DL    ALT 47 (H) 10 - 40 U/L    AST 78 (H) 15 - 37 IU/L    Alkaline Phosphatase 100 40 - 129 IU/L    GFR Non-African American >60 >60 mL/min/1.73m2    GFR African American >60 >60 mL/min/1.73m2    Anion Gap 14 4 - 16   Lipase   Result Value Ref Range    Lipase 28 13 - 60 IU/L   Urinalysis with microscopic   Result Value Ref Range    Color, UA YELLOW YELLOW    Clarity, UA HAZY (A) CLEAR    Glucose, Urine >500 (A) NEGATIVE MG/DL    Bilirubin Urine NEGATIVE NEGATIVE MG/DL    Ketones, Urine NEGATIVE NEGATIVE MG/DL    Specific Gravity, UA 1.022 1.001 - 1.035    Blood, Urine NEGATIVE NEGATIVE    pH, Urine 5.0 5.0 - 8.0    Protein,  (A) NEGATIVE MG/DL    Urobilinogen, Urine NORMAL 0.2 - 1.0 MG/DL    Nitrite Urine, Quantitative NEGATIVE NEGATIVE    Leukocyte Esterase, Urine TRACE (A) NEGATIVE    RBC, UA 9 (H) 0 - 6 /HPF    WBC, UA 81 (H) 0 - 5 /HPF    Bacteria, UA NEGATIVE NEGATIVE /HPF    Squam Epithel, UA 1 /HPF    Mucus, UA RARE (A) NEGATIVE HPF    Trichomonas, UA NONE SEEN NONE SEEN /HPF   Lactic Acid, Plasma   Result Value Ref Range    Lactate 2.1 (HH) 0.4 - 2.0 mMOL/L   TSH without Reflex   Result Value Ref Range    TSH, High Sensitivity 0.833 0.270 - 4.20 uIu/ml   T4, free   Result Value Ref Range    T4 Free 1.11 0.9 - 1.8 NG/DL   EKG 12 Lead   Result Value Ref Range    Ventricular Rate 0 BPM    Atrial Rate 0 BPM    QRS Duration 0 ms    Q-T Interval 0 ms    QTc Calculation (Bazett) 0 ms    R Axis 0 degrees    T Axis 0 degrees    Diagnosis       ** No QRS complexes found, no ECG analysis possible **  When compared with ECG of 22-FEB-2019 15:51,  Current undetermined rhythm precludes rhythm comparison, needs review        Radiographs (if obtained):  [] The following radiograph was interpreted by myself in the absence of a radiologist:  [x]Radiologist's Report Reviewed:  CT ABDOMEN PELVIS WO CONTRAST Additional Contrast? None   Final Result   1. Mild dilatation of the right renal pelvis and proximal right ureter with   periureteral stranding. No urinary tract calculus is identified. Differential diagnostic considerations include recent passage of a calculus   or pyelonephritis. Correlation with urinalysis is advised. 2. Colonic diverticulosis. 3. Benign right adrenal adenoma. EKG (if obtained): (All EKG's are interpreted by myself in the absence of a cardiologist)  Sinus tachycardia with a rate of 139. VT interval 144, QRS 86, QTc 432. No ST elevations or depressions. Normal T waves. Impression: Abnormal EKG. When compared to previous EKG from 2/22/2019, the tachycardia is new, otherwise no significant changes. MDM:  Differential diagnoses considered include but are not limited to muscle strain, muscle spasm, renal colic, pyelonephritis, diverticulitis, intestinal spasm, appendicitis, IBS. Upon arrival pt appears uncomfortable but has normal vital signs. Labs are unremarkable. Urine has large number of white blood cells. Will send for culture. Patient is now more tachycardic. Her pain is improving. We will give her IV fluids and give her a dose of Rocephin. Blood cultures were obtained.     If patient's tachycardia is improved after IV fluids plan to discharge her home with antibiotics if it is not improved plan to admit her to the hospital for further evaluation and treatment. I did don appropriate PPE (including N95 face mask, protective eye ware/safety glasses, gloves, hair covering, and no isolation gown), as recommended by the health facility/national standard best practice, during my bedside interactions with the patient. The likelihood of other entities in the differential is insufficient to justify any further testing for them. This was explained to the patient. The patient was advised that persistent or worsening symptoms would requirefurther evaluation. Clinical Impression:  1. Right flank pain    2. Pyelonephritis          Tracie Giraldo MD       Please note that portions of this note may have been complete with a voice recognition program.  Effortswere made to edit the dictations, but occasional words are mis-transcribed.           Tracie Giraldo MD  11/10/20 [id number]

## 2020-11-06 VITALS
WEIGHT: 293 LBS | TEMPERATURE: 98.2 F | SYSTOLIC BLOOD PRESSURE: 134 MMHG | RESPIRATION RATE: 22 BRPM | HEART RATE: 111 BPM | OXYGEN SATURATION: 95 % | HEIGHT: 68 IN | BODY MASS INDEX: 44.41 KG/M2 | DIASTOLIC BLOOD PRESSURE: 89 MMHG

## 2020-11-06 PROCEDURE — 93010 ELECTROCARDIOGRAM REPORT: CPT | Performed by: INTERNAL MEDICINE

## 2020-11-06 NOTE — ED PROVIDER NOTES
Emergency Department Encounter  Location: [address] [place] [address] EMERGENCY DEPARTMENT    Patient: Ryder Cuenca  MRN: [id number]  : 1960  Date of evaluation: 2020  ED Provider: Britton Cueva PA-C    2300 PM  Ryder Cuenca was checked out to me by Dr. Amador Gill. Please see his/her initial documentation for details of the patient's initial ED presentation, physical exam and completed studies. In brief, Ryder Cuenca is a 61 y.o. female that presented to the emergency department for flank pain. She does have evidence of a UTI, possible recently passed stone. She is receiving IV fluids at time of sign-out for tachycardia. Will reassess at completion.     I have reviewed and interpreted all of the currently available lab results and diagnostics from this visit:  Results for orders placed or performed during the hospital encounter of 20   CBC with Auto Diff   Result Value Ref Range    WBC 7.9 4.0 - 10.5 K/CU MM    RBC 5.11 4.2 - 5.4 M/CU MM    Hemoglobin 13.9 12.5 - 16.0 GM/DL    Hematocrit 43.7 37 - 47 %    MCV 85.5 78 - 100 FL    MCH 27.2 27 - 31 PG    MCHC 31.8 (L) 32.0 - 36.0 %    RDW 13.5 11.7 - 14.9 %    Platelets 654 350 - 282 K/CU MM    MPV 10.7 7.5 - 11.1 FL    Differential Type AUTOMATED DIFFERENTIAL     Segs Relative 69.1 (H) 36 - 66 %    Lymphocytes % 23.1 (L) 24 - 44 %    Monocytes % 5.8 (H) 0 - 4 %    Eosinophils % 1.3 0 - 3 %    Basophils % 0.3 0 - 1 %    Segs Absolute 5.5 K/CU MM    Lymphocytes Absolute 1.8 K/CU MM    Monocytes Absolute 0.5 K/CU MM    Eosinophils Absolute 0.1 K/CU MM    Basophils Absolute 0.0 K/CU MM    Nucleated RBC % 0.0 %    Total Nucleated RBC 0.0 K/CU MM    Total Immature Neutrophil 0.03 K/CU MM    Immature Neutrophil % 0.4 0 - 0.43 %   CMP   Result Value Ref Range    Sodium 134 (L) 135 - 145 MMOL/L    Potassium 3.9 3.5 - 5.1 MMOL/L    Chloride 96 (L) 99 - 110 mMol/L    CO2 24 21 - 32 MMOL/L    BUN 10 6 - 23 MG/DL    CREATININE 0.8 0.6 - 1.1 MG/DL    Glucose 325 (H) 70 - 99 MG/DL    Calcium 9.6 8.3 - 10.6 MG/DL    Alb 3.7 3.4 - 5.0 GM/DL    Total Protein 7.0 6.4 - 8.2 GM/DL    Total Bilirubin 0.2 0.0 - 1.0 MG/DL    ALT 47 (H) 10 - 40 U/L    AST 78 (H) 15 - 37 IU/L    Alkaline Phosphatase 100 40 - 129 IU/L    GFR Non-African American >60 >60 mL/min/1.73m2    GFR African American >60 >60 mL/min/1.73m2    Anion Gap 14 4 - 16   Lipase   Result Value Ref Range    Lipase 28 13 - 60 IU/L   Urinalysis with microscopic   Result Value Ref Range    Color, UA YELLOW YELLOW    Clarity, UA HAZY (A) CLEAR    Glucose, Urine >500 (A) NEGATIVE MG/DL    Bilirubin Urine NEGATIVE NEGATIVE MG/DL    Ketones, Urine NEGATIVE NEGATIVE MG/DL    Specific Gravity, UA 1.022 1.001 - 1.035    Blood, Urine NEGATIVE NEGATIVE    pH, Urine 5.0 5.0 - 8.0    Protein,  (A) NEGATIVE MG/DL    Urobilinogen, Urine NORMAL 0.2 - 1.0 MG/DL    Nitrite Urine, Quantitative NEGATIVE NEGATIVE    Leukocyte Esterase, Urine TRACE (A) NEGATIVE    RBC, UA 9 (H) 0 - 6 /HPF    WBC, UA 81 (H) 0 - 5 /HPF    Bacteria, UA NEGATIVE NEGATIVE /HPF    Squam Epithel, UA 1 /HPF    Mucus, UA RARE (A) NEGATIVE HPF    Trichomonas, UA NONE SEEN NONE SEEN /HPF   Lactic Acid, Plasma   Result Value Ref Range    Lactate 2.1 (HH) 0.4 - 2.0 mMOL/L   TSH without Reflex   Result Value Ref Range    TSH, High Sensitivity 0.833 0.270 - 4.20 uIu/ml   T4, free   Result Value Ref Range    T4 Free 1.11 0.9 - 1.8 NG/DL   EKG 12 Lead   Result Value Ref Range    Ventricular Rate 0 BPM    Atrial Rate 0 BPM    QRS Duration 0 ms    Q-T Interval 0 ms    QTc Calculation (Bazett) 0 ms    R Axis 0 degrees    T Axis 0 degrees    Diagnosis       ** No QRS complexes found, no ECG analysis possible **  When compared with ECG of 22-FEB-2019 15:51,  Current undetermined rhythm precludes rhythm comparison, needs review       Ct Abdomen Pelvis Wo Contrast Additional Contrast? None    Result Date: 11/5/2020  EXAMINATION: CT OF THE ABDOMEN AND PELVIS WITHOUT CONTRAST 11/5/2020 7:56 pm TECHNIQUE: CT of the abdomen and pelvis was performed without the administration of intravenous contrast. Multiplanar reformatted images are provided for review. Dose modulation, iterative reconstruction, and/or weight based adjustment of the mA/kV was utilized to reduce the radiation dose to as low as reasonably achievable. COMPARISON: 12/30/2010 HISTORY: ORDERING SYSTEM PROVIDED HISTORY: right lower back into RLQ TECHNOLOGIST PROVIDED HISTORY: Reason for exam:->right lower back into RLQ Additional Contrast?->None Reason for Exam: right lower back into RLQ Acuity: Acute Type of Exam: Initial Additional signs and symptoms: surg. hx; tubal Relevant Medical/Surgical History: none FINDINGS: Lower Chest: Minimal bibasilar atelectasis. Organs: Limited evaluation due lack of intravenous contrast.  Hepatomegaly with punctate calcific granulomatous changes. Gallbladder, biliary system, pancreas, spleen, and left adrenal gland are unremarkable. 2.2 cm right adrenal nodule measures less than 10 Hounsfield units compatible with a benign adenoma. Left kidney is unremarkable. New mild dilatation of the right renal pelvis and proximal right ureter with periureteral stranding. Mild bilateral perinephric stranding is also noted, nonspecific. No urinary tract calculus. GI/Bowel: No acute bowel abnormality. Normal appendix. Scattered colonic diverticula. Pelvis: Urinary bladder and pelvic organs unremarkable. Peritoneum/Retroperitoneum: No free air or free fluid. Normal abdominal aortic caliber. Mild calcific atherosclerosis. Bones/Soft Tissues: No acute osseous abnormality. Injection sites in the lower anterior abdominal wall. No radiopaque foreign body or soft tissue gas. No focal fluid collection. 1. Mild dilatation of the right renal pelvis and proximal right ureter with periureteral stranding. No urinary tract calculus is identified.  Differential diagnostic considerations include recent passage of a calculus or pyelonephritis. Correlation with urinalysis is advised. 2. Colonic diverticulosis. 3. Benign right adrenal adenoma. Final ED Course and MDM:  -  Patient seen and evaluated in the emergency department. -  Triage and nursing notes reviewed and incorporated. -  Old chart records reviewed and incorporated. -  Supervising physician was Dr. Chelo Ruiz. She saw and examined patient. -  Patient was reassessed after a liter of NS. HR is 111 on my exam.  Patient reports she feels much better and is wanting to go home. We will start her on Keflex--she has Norco at home for pain. Encouraged hydration. She was given strict return precautions for new/worsening symptoms and she voiced understanding and agreement with plan of care. -  DC home. In light of current events, I did utilize appropriate PPE (including surgical face mask, safety glasses, and gloves, as recommended by the health facility/national standard best practice, during my bedside interactions with the patient. Final Impression      1. Right flank pain    2.  Pyelonephritis        DISPOSITION Decision To Discharge 11/05/2020 11:58:17 PM     (Please note that portions of this note may have been completed with a voice recognition program. Efforts were made to edit the dictations but occasionally words are mis-transcribed.)    Bakari Morales, [address] Gates PA-C  11/06/20 0002

## 2020-11-08 LAB
CULTURE: ABNORMAL
CULTURE: ABNORMAL
Lab: ABNORMAL
SPECIMEN: ABNORMAL

## 2020-11-10 LAB
CULTURE: NORMAL
CULTURE: NORMAL
EKG ATRIAL RATE: 139 BPM
EKG DIAGNOSIS: NORMAL
EKG P-R INTERVAL: 144 MS
EKG Q-T INTERVAL: 284 MS
EKG QRS DURATION: 86 MS
EKG QTC CALCULATION (BAZETT): 432 MS
EKG R AXIS: -19 DEGREES
EKG T AXIS: 152 DEGREES
EKG VENTRICULAR RATE: 139 BPM
Lab: NORMAL
Lab: NORMAL
SPECIMEN: NORMAL
SPECIMEN: NORMAL

## 2021-02-18 ENCOUNTER — HOSPITAL ENCOUNTER (OUTPATIENT)
Age: 61
Discharge: HOME | End: 2021-02-18
Payer: MEDICAID

## 2021-02-18 ENCOUNTER — HOSPITAL ENCOUNTER (OUTPATIENT)
Dept: GENERAL RADIOLOGY | Age: 61
Discharge: HOME | End: 2021-02-18
Payer: MEDICAID

## 2021-02-18 DIAGNOSIS — M12.9 ARTHROPATHY, UNSPECIFIED: ICD-10-CM

## 2021-02-18 PROCEDURE — 72100 X-RAY EXAM L-S SPINE 2/3 VWS: CPT

## 2021-03-03 ENCOUNTER — APPOINTMENT (OUTPATIENT)
Dept: CT IMAGING | Age: 61
End: 2021-03-03
Payer: MEDICAID

## 2021-03-03 ENCOUNTER — APPOINTMENT (OUTPATIENT)
Dept: GENERAL RADIOLOGY | Age: 61
End: 2021-03-03
Payer: MEDICAID

## 2021-03-03 ENCOUNTER — HOSPITAL ENCOUNTER (EMERGENCY)
Age: 61
Discharge: HOME | End: 2021-03-03
Attending: EMERGENCY MEDICINE
Payer: MEDICAID

## 2021-03-03 VITALS
DIASTOLIC BLOOD PRESSURE: 85 MMHG | RESPIRATION RATE: 18 BRPM | OXYGEN SATURATION: 95 % | TEMPERATURE: 97.9 F | BODY MASS INDEX: 44.41 KG/M2 | HEART RATE: 82 BPM | SYSTOLIC BLOOD PRESSURE: 183 MMHG | HEIGHT: 68 IN | WEIGHT: 293 LBS

## 2021-03-03 DIAGNOSIS — M25.559 ACUTE HIP PAIN, UNSPECIFIED LATERALITY: ICD-10-CM

## 2021-03-03 DIAGNOSIS — S40.811A ABRASION OF RIGHT UPPER EXTREMITY, INITIAL ENCOUNTER: ICD-10-CM

## 2021-03-03 DIAGNOSIS — M79.601 ARM PAIN, RIGHT: ICD-10-CM
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
## 2021-03-03 PROCEDURE — 72125 CT NECK SPINE W/O DYE: CPT

## 2021-03-03 PROCEDURE — 70450 CT HEAD/BRAIN W/O DYE: CPT

## 2021-03-03 PROCEDURE — 99284 EMERGENCY DEPT VISIT MOD MDM: CPT

## 2021-03-03 PROCEDURE — 6370000000 HC RX 637 (ALT 250 FOR IP): Performed by: EMERGENCY MEDICINE

## 2021-03-03 PROCEDURE — 73502 X-RAY EXAM HIP UNI 2-3 VIEWS: CPT

## 2021-03-03 PROCEDURE — 73090 X-RAY EXAM OF FOREARM: CPT

## 2021-03-03 PROCEDURE — 6370000000 HC RX 637 (ALT 250 FOR IP): Performed by: PHYSICIAN ASSISTANT

## 2021-03-03 RX ORDER — HYDROCODONE BITARTRATE AND ACETAMINOPHEN 5; 325 MG/1; MG/1
1 TABLET ORAL ONCE
Status: COMPLETED | OUTPATIENT
Start: 2021-03-03 | End: 2021-03-03

## 2021-03-03 RX ORDER — DIAPER,BRIEF,INFANT-TODD,DISP
EACH MISCELLANEOUS ONCE
Status: COMPLETED | OUTPATIENT
Start: 2021-03-03 | End: 2021-03-03

## 2021-03-03 RX ADMIN — BACITRACIN ZINC 1 G: 500 OINTMENT TOPICAL at 20:24

## 2021-03-03 RX ADMIN — HYDROCODONE BITARTRATE AND ACETAMINOPHEN 1 TABLET: 5; 325 TABLET ORAL at 18:33

## 2021-03-03 ASSESSMENT — PAIN DESCRIPTION - INTENSITY
RATING_2: 7
RATING_3: 8

## 2021-03-03 ASSESSMENT — PAIN DESCRIPTION - PAIN TYPE
TYPE: ACUTE PAIN
TYPE_3: ACUTE PAIN

## 2021-03-03 ASSESSMENT — PAIN DESCRIPTION - DURATION: DURATION_3: CONTINUOUS

## 2021-03-03 ASSESSMENT — PAIN DESCRIPTION - ONSET
ONSET: ON-GOING
ONSET_2: ON-GOING

## 2021-03-03 ASSESSMENT — PAIN DESCRIPTION - FREQUENCY: FREQUENCY: CONTINUOUS

## 2021-03-03 ASSESSMENT — PAIN DESCRIPTION - DESCRIPTORS
DESCRIPTORS_3: ACHING
DESCRIPTORS_2: CONSTANT

## 2021-03-03 ASSESSMENT — PAIN SCALES - GENERAL
PAINLEVEL_OUTOF10: 8
PAINLEVEL_OUTOF10: 9

## 2021-03-03 ASSESSMENT — PAIN DESCRIPTION - ORIENTATION
ORIENTATION_2: RIGHT
ORIENTATION: POSTERIOR;RIGHT

## 2021-03-03 ASSESSMENT — PAIN DESCRIPTION - LOCATION: LOCATION_2: HIP

## 2021-03-03 NOTE — ED NOTES
Pt updated on plan of care     Ming Craig RN  03/03/21 0471 

## 2021-03-03 NOTE — ED NOTES
Pt fall down to steps attempting to keep her grand daughter from fall down stairs. Pt has pain to R posterior head, R hip and R forearm. Pt denies LOC but states she saw stars. Denies blood thinners.  Per EMS, pt was already up and was able to walk prior to arrival.      Anders Hamman, RN  03/03/21 [id number]

## 2021-03-03 NOTE — ED PROVIDER NOTES
ATTENDING NOTE:    I discussed this patient's history and physical findings and reviewed the PA's findings with them, as well as performed an independent assessment and coordinated care with them. My additional findings are:    HISTORY OF PRESENT ILLNESS:  Chief Complaint   Patient presents with    Fall     fall down 2 stairs,hematoma to R posterior head, R forearm    Head Injury    Hip Pain     R hip pain    Arm Pain     hematoma and pain to R forearm   . Sandra Garner is a 61 y.o. female who presents with 70-year-old female who presents with complaints of right hip pain and right forearm pain with swelling as well his head injury after falling down 2 steps today. PHYSICAL EXAM:  VITAL SIGNS:   ED Triage Vitals [03/03/21 1658]   Enc Vitals Group      BP (!) 183/85      Pulse 82      Resp 18      Temp 97.9 °F (36.6 °C)      Temp Source Oral      SpO2 95 %      Weight 296 lb (134.3 kg)      Height 5' 8\" (1.727 m)      Head Circumference       Peak Flow       Pain Score       Pain Loc       Pain Edu? Excl. in [address]? Vitals during ED course were reviewed and are as charted. Key Physical Exam Findings:    Constitutional: Minimal distress, Non-toxic appearance    Eyes:  Conjunctiva normal, No discharge, PERRL, EOMI    HENT: Normocephalic, Atraumatic    Neck: Supple, no midline cervical spinal tenderness palpation or palpable deformities, no stridor, no grossly visible or palpable masses    Cardiovascular: Regular rate and rhythm, No murmurs, No rubs, No gallops    Pulmonary/Chest:  Normal breath sounds, No respiratory distress or accessory muscle use, No wheezing, crackles or rhonchi. Abdomen:  Soft, nondistended and nonrigid, No tenderness or peritoneal signs, No masses, normal bowel sounds    Back:  No midline point tenderness, No paraspinous muscle tenderness.   No CVA tenderness    Extremities: Tenderness palpation the mid right forearm without palpable deformities, otherwise elsewhere there are no gross deformities, no edema, no tenderness    Neurologic:  Normal motor function, Normal sensory function, No focal deficits    Skin:  Warm, Dry, No erythema, No rash, No cyanosis, No mottling    Lymphatic:  No lymphadenopathy in the following location(s): cervical    Psychiatric:  Alert and oriented x3, Affect normal          RADIOLOGY/PROCEDURES/LABS/MEDICATIONS ADMINISTERED:    I have reviewed and interpreted all of the currently available lab results from this visit (if applicable):  No results found for this visit on 03/03/21. ABNORMAL LABS:  Labs Reviewed - No data to display      IMAGING STUDIES ORDERED:  CT HEAD WO CONTRAST  CT CERVICAL SPINE WO CONTRAST  XR HIP 2-3 VW W PELVIS RIGHT  XR RADIUS ULNA RIGHT (2 VIEWS)    I have personally viewed the imaging studies. The radiologist interpretation is:   CT CERVICAL SPINE WO CONTRAST   Final Result   No acute abnormality of the cervical spine. CT HEAD WO CONTRAST   Final Result   No acute intracranial abnormality. XR RADIUS ULNA RIGHT (2 VIEWS)   Final Result   Possible nondisplaced fracture radial diaphysis         XR HIP 2-3 VW W PELVIS RIGHT   Final Result   No acute osseous abnormality. MEDICATIONS ADMINISTERED:  Medications   HYDROcodone-acetaminophen (NORCO) 5-325 MG per tablet 1 tablet (1 tablet Oral Given 3/3/21 1833)   bacitracin zinc ointment (1 g Topical Given 3/3/21 2024)         PLAN/ED COURSE:  Last Vitals: BP (!) 183/85   Pulse 82   Temp 97.9 °F (36.6 °C) (Oral)   Resp 18   Ht 5' 8\" (1.727 m)   Wt 296 lb (134.3 kg)   SpO2 95%   BMI 45.01 kg/m²       80-year-old female with right hip pain and contusion abrasion of the right forearm and closed head injury after mechanical fall. There is no clinical or radiographic evidence for serious injury. She is neurovascularly intact. Pain well controlled.     Additional workup and treatment in the ED as documented above and in the physician assistants note. Patient reassured and will be discharged home. The patient has been advised to follow-up with their primary care physician and/or referred physician in the next two to three days or sooner if worsening and to return to the ER immediately as above with any concerns. Clinical Impression:  1. Fall, initial encounter    2. Acute hip pain, unspecified laterality    3. Abrasion of right upper extremity, initial encounter    4. Arm pain, right        Disposition referral (if applicable):  Margy Morales DO  [address]  [phone]    In 2 days        Disposition medications (if applicable):  Discharge Medication List as of 3/3/2021  9:05 PM      START taking these medications    Details   bacitracin-neomycin-polymyxin b-hydrocortisone 1 % ointment Apply topically 2 times daily. , Disp-1 Tube, R-0, Normal             ED Provider Disposition Time  DISPOSITION Decision To Discharge 03/03/2021 08:10:54 PM            Electronically signed by Nichol Castillo MD on 3/4/2021 at 3:09 PM      Nichol Castillo MD  03/04/21 3366

## 2021-03-04 NOTE — ED PROVIDER NOTES
Inability: Not on file    Transportation needs     Medical: Not on file     Non-medical: Not on file   Tobacco Use    Smoking status: Former Smoker    Smokeless tobacco: Never Used   Substance and Sexual Activity    Alcohol use: No    Drug use: No    Sexual activity: Not on file   Lifestyle    Physical activity     Days per week: Not on file     Minutes per session: Not on file    Stress: Not on file   Relationships    Social connections     Talks on phone: Not on file     Gets together: Not on file     Attends Jewish service: Not on file     Active member of club or organization: Not on file     Attends meetings of clubs or organizations: Not on file     Relationship status: Not on file    Intimate partner violence     Fear of current or ex partner: Not on file     Emotionally abused: Not on file     Physically abused: Not on file     Forced sexual activity: Not on file   Other Topics Concern    Not on file   Social History Narrative    Not on file     Current Facility-Administered Medications   Medication Dose Route Frequency Provider Last Rate Last Admin    bacitracin zinc ointment   Topical Once Dandy Phan PA-C         Current Outpatient Medications   Medication Sig Dispense Refill    amLODIPine-benazepril (LOTREL) 10-40 MG per capsule Take 1 capsule by mouth daily 30 capsule 5    ondansetron (ZOFRAN ODT) 4 MG disintegrating tablet Take 1 tablet by mouth every 8 hours as needed for Nausea 15 tablet 0    lidocaine (LIDODERM) 5 % Place 1 patch onto the skin daily 12 hours on, 12 hours off.  30 patch 0    RA ALCOHOL SWABS 70 % PADS use as directed three times a day  0    ibuprofen (ADVIL;MOTRIN) 600 MG tablet Take 1 tablet by mouth every 6 hours as needed for Pain 30 tablet 0    Pseudoephedrine-DM-GG 30- MG CAPS Take 1 tablet by mouth 2 times daily as needed (cough or congestion) 10 capsule 0    naproxen (NAPROSYN) 500 MG tablet Take 1 tablet by mouth 2 times daily 60 tablet 0  pravastatin (PRAVACHOL) 20 MG tablet take 1 tablet by mouth once daily 30 tablet 5    linagliptin (TRADJENTA) 5 MG tablet Take 1 tablet by mouth daily 30 tablet 3    metFORMIN (GLUCOPHAGE) 850 MG tablet Take 1 tablet by mouth 2 times daily (with meals) 60 tablet 3    insulin lispro (HUMALOG) 100 UNIT/ML injection vial Inject 30 Units into the skin 3 times daily (before meals) 1 vial 3    guaiFENesin (MUCINEX) 600 MG extended release tablet Take 1 tablet by mouth 2 times daily 20 tablet 0    mometasone-formoterol (DULERA) 100-5 MCG/ACT inhaler Inhale 2 puffs into the lungs 2 times daily 13 g 3    albuterol (PROVENTIL) (2.5 MG/3ML) 0.083% nebulizer solution   0    divalproex (DEPAKOTE) 250 MG DR tablet take 1 tablet by mouth twice a day  0    acetaminophen (AMINOFEN) 325 MG tablet Take 2 tablets by mouth every 6 hours as needed for Pain 60 tablet 0    dicyclomine (BENTYL) 20 MG tablet Take 20 mg by mouth every 6 hours      VENTOLIN  (90 Base) MCG/ACT inhaler   0    LUMIGAN 0.01 % SOLN ophthalmic drops Place 1 drop into both eyes daily   1    HYDROcodone-acetaminophen (NORCO) 5-325 MG per tablet 1 tablet 3 times daily as needed. 0    theophylline (THEODUR) 300 MG extended release tablet 1 tablet daily  0    pantoprazole (PROTONIX) 40 MG tablet Take 40 mg by mouth daily      furosemide (LASIX) 20 MG tablet take 1/2 tablet every few weeks  0    LYRICA 75 MG capsule take 1 capsule by mouth twice a day  0    ibuprofen (ADVIL;MOTRIN) 600 MG tablet Take 1 tablet by mouth every 8 hours as needed for Pain 30 tablet 0    Misc. Devices (CANE) MISC 1 Device by Does not apply route daily as needed. 1 each 0    DULoxetine (CYMBALTA) 60 MG capsule Take 60 mg by mouth daily.       insulin glargine (LANTUS) 100 UNIT/ML injection Inject 50 Units into the skin nightly        Allergies   Allergen Reactions    Codeine     Adhesive Tape Itching    Toradol [Ketorolac Tromethamine] Other (See Comments) Headache for 5-6 days       Nursing Notes Reviewed    Physical Exam:  ED Triage Vitals [03/03/21 1658]   Enc Vitals Group      BP (!) 183/85      Pulse 82      Resp 18      Temp 97.9 °F (36.6 °C)      Temp Source Oral      SpO2 95 %      Weight 296 lb (134.3 kg)      Height 5' 8\" (1.727 m)      Head Circumference       Peak Flow       Pain Score       Pain Loc       Pain Edu? Excl. in [address]? GENERAL APPEARANCE: Awake and alert. Cooperative. No acute distress. HEAD: Normocephalic. Atraumatic. No ayon sign no raccoon eyes no hemotympanum. Pupils equal round reactive light and accommodation. CERVICAL SPINE: There is no cervical midline tenderness to palpation, step-offs, or acute deformities. No posterior midline pain on ROM. The cervical spine has been cleared clinically. NECK: Full ROM  LUNGS: Respirations unlabored. ABDOMEN: Soft. Non-tender. No guarding or rebound. No organomegaly. No palpable masses  MUSCULOSKELETAL: No acute deformities. Patient's right forearm shows no scaphoid tenderness palpation along the wrist region. There is no distal radial or ulnar tenderness palpation ipsilateral  strength 5/5. Distal sensation is intact. Cap refill less than 2 seconds. There is full range of motion of the wrist in all directions. Full range of motion of ipsilateral forearm with supination pronation flexion and extension. There is minimal tenderness palpation noted along the proximal one third of the radius as well as the midshaft of the ulna. Midshaft of the ulna region does have several abrasions noted. No lacerations no active bleeding. Legs are equal in length without rotation. Patent stands without depression of hips. There is unilateral tenderness to palpation to the iliac crest. Full ROM of his bilateral with internal rotation, external rotation, Abduction, Adduction, flexion and extension. No obvious deformities. SKIN: Warm and dry. No rash, No erythema, No edema.  No ecchymoses. NEUROLOGICAL: No gross facial drooping. Moves all 4 extremities spontaneously. PSYCHIATRIC: Normal mood. I have reviewed and interpreted all of the currently available lab results from this visit (if applicable):  No results found for this visit on 03/03/21. Radiographs (if obtained):  [] The following radiograph was interpreted by myself in the absence of a radiologist:   [] Radiologist's Report Reviewed:  CT CERVICAL SPINE WO CONTRAST   Final Result   No acute abnormality of the cervical spine. CT HEAD WO CONTRAST   Final Result   No acute intracranial abnormality. XR RADIUS ULNA RIGHT (2 VIEWS)   Final Result   Possible nondisplaced fracture radial diaphysis         XR HIP 2-3 VW W PELVIS RIGHT   Final Result   No acute osseous abnormality. EKG (if obtained):   Please See Note of attending physician for EKG interpretation. Chart review shows recent radiograph(s):  Xr Lumbar Spine (2-3 Views)    Result Date: 2/19/2021  EXAMINATION: THREE XRAY VIEWS OF THE LUMBAR SPINE 2/18/2021 12:37 pm COMPARISON: 12/22/2019 HISTORY: ORDERING SYSTEM PROVIDED HISTORY: Arthropathy, unspecified TECHNOLOGIST PROVIDED HISTORY: Reason for Exam: Patient reports chronic lower back pain FINDINGS: 2.5 mm retrolisthesis L5 on S1. Moderate spurring anteriorly at L3-L4 and L4-L5. Mild spurring at L1-L2. Vascular calcifications are noted about the abdomen. Moderate facet hypertrophy is noted at the L4-L5 and L5-S1 level. No compression fracture. Moderate degenerative changes are visualized in the lower thoracic spine, not well studied. Moderate degenerative changes redemonstrated of the lumbar spine as described above.      Xr Radius Ulna Right (2 Views)    Result Date: 3/3/2021  EXAMINATION: TWO XRAY VIEWS OF THE RIGHT FOREARM 3/3/2021 6:52 pm COMPARISON: 05/27/2019 HISTORY: ORDERING SYSTEM PROVIDED HISTORY: fall- R arm injury TECHNOLOGIST PROVIDED HISTORY: Reason for exam:->fall- R arm injury Reason for Exam: several hematomas    pain lacerations Acuity: Acute Type of Exam: Initial Mechanism of Injury: fell off porch FINDINGS: Possible nondisplaced fracture radial diaphysis. Otherwise, no fracture identified. There is normal alignment. No acute joint abnormality. No focal osseous lesion. No focal soft tissue abnormality. Possible nondisplaced fracture radial diaphysis     Ct Head Wo Contrast    Result Date: 3/3/2021  EXAMINATION: CT OF THE HEAD WITHOUT CONTRAST  3/3/2021 6:59 pm TECHNIQUE: CT of the head was performed without the administration of intravenous contrast. Dose modulation, iterative reconstruction, and/or weight based adjustment of the mA/kV was utilized to reduce the radiation dose to as low as reasonably achievable. COMPARISON: 12/22/2019 HISTORY: ORDERING SYSTEM PROVIDED HISTORY: fall TECHNOLOGIST PROVIDED HISTORY: Reason for exam:->fall Has a \"code stroke\" or \"stroke alert\" been called? ->No Decision Support Exception->Emergency Medical Condition (MA) Reason for Exam: fall, head/neck pain. Acuity: Acute Type of Exam: Initial Mechanism of Injury: fall, head/neck pain. Relevant Medical/Surgical History: none FINDINGS: BRAIN/VENTRICLES: There is no acute intracranial hemorrhage, mass effect or midline shift. No abnormal extra-axial fluid collection. The gray-white differentiation is maintained without evidence of an acute infarct. There is no evidence of hydrocephalus. ORBITS: The visualized portion of the orbits demonstrate no acute abnormality. SINUSES: The visualized paranasal sinuses and mastoid air cells demonstrate no acute abnormality. SOFT TISSUES/SKULL:  No acute abnormality of the visualized skull or soft tissues. No acute intracranial abnormality.      Ct Cervical Spine Wo Contrast    Result Date: 3/3/2021  EXAMINATION: CT OF THE CERVICAL SPINE WITHOUT CONTRAST 3/3/2021 7:00 pm TECHNIQUE: CT of the cervical spine was performed without the administration of intravenous contrast. Multiplanar reformatted images are provided for review. Dose modulation, iterative reconstruction, and/or weight based adjustment of the mA/kV was utilized to reduce the radiation dose to as low as reasonably achievable. COMPARISON: None. HISTORY: ORDERING SYSTEM PROVIDED HISTORY: fall TECHNOLOGIST PROVIDED HISTORY: Reason for exam:->fall Decision Support Exception->Emergency Medical Condition (MA) Reason for Exam: fall, head/neck pain. Acuity: Acute Type of Exam: Initial Mechanism of Injury: fall, head/neck pain. Relevant Medical/Surgical History: none FINDINGS: BONES/ALIGNMENT: There is no acute fracture or traumatic malalignment. DEGENERATIVE CHANGES: Mild multilevel degenerative changes. SOFT TISSUES: There is no prevertebral soft tissue swelling. No acute abnormality of the cervical spine. Xr Hip 2-3 Vw W Pelvis Right    Result Date: 3/3/2021  EXAMINATION: ONE XRAY VIEW OF THE PELVIS AND TWO XRAY VIEWS RIGHT HIP 3/3/2021 6:52 pm COMPARISON: None. HISTORY: ORDERING SYSTEM PROVIDED HISTORY: R hip pain after fall TECHNOLOGIST PROVIDED HISTORY: Reason for exam:->R hip pain after fall Reason for Exam: pain right hip Acuity: Acute Type of Exam: Initial Mechanism of Injury: fell off porch FINDINGS: There is no evidence of acute fracture. There is normal alignment. No acute joint abnormality. No focal osseous lesion. No focal soft tissue abnormality. No acute osseous abnormality. MDM:   Neurovascularly intact. Patient presents today with signs and symptoms that are congruent with musculoskeletal pain of the forearm and hip after a fall. Xray negative for any acute osseous abnormality of the hip. CT scan of the head and C-spine are negative per radiologist interpretation    X-ray of the right forearm does show a possible radial body fracture. There is minimal tenderness palpation over the midshaft. For this reason will splint patient.   And educate patient that she will require a repeat x-ray in 1 week    I have very low clinical suspicion of any fracture. However patient is educated that should symptoms persist and did not improve over the next 4-5 days patient should have orthopedic follow up as referred for x-rays to rule out fracture. I estimate there is LOW risk for Fracture, COMPARTMENT SYNDROME, DEEP VENOUS THROMBOSIS, COMPLETE TENDON RUPTURE, OR NEUROVASCULAR INJURY, thus I consider the discharge disposition reasonable. Pt is to be discharged home. Pt is  to return immediately to the emergency department if he has any new, worrisome or worsening symptoms. Pt is to follow up with PCP within 2 days. Patient/Surrogate vocalizes agreement and understanding with this plan and he has no questions upon disposition. Pt is comfortable upon disposition home. Patient is stable, Patients vital signs are stable. Vital signs and nursing notes reviewed during ED course. I independently managed patient today in the ED. All pertinent Lab data and radiographic results reviewed with patient at bedside. The patient and/or the family were informed of the results of any tests/labs/imaging, the treatment plan, and time was allotted to answer questions. See chart for details of medications given during the ED stay. BP (!) 183/85   Pulse 82   Temp 97.9 °F (36.6 °C) (Oral)   Resp 18   Ht 5' 8\" (1.727 m)   Wt 296 lb (134.3 kg)   SpO2 95%   BMI 45.01 kg/m²       Clinical Impression:  1. Fall, initial encounter    2. Acute hip pain, unspecified laterality    3. Abrasion of right upper extremity, initial encounter    4. Arm pain, right        Disposition referral (if applicable):  No follow-up provider specified.   Disposition medications (if applicable):  New Prescriptions    No medications on file       Comment: Please note this report has been produced using speech recognition software and may contain errors related to that system including errors in grammar, punctuation, and spelling, as well as words and phrases that may be inappropriate. If there are any questions or concerns please feel free to contact the dictating provider for clarification.     [facility], [address] [place] [address]  03/03/21 2013

## 2021-03-09 ENCOUNTER — OFFICE VISIT (OUTPATIENT)
Dept: ORTHOPEDIC SURGERY | Age: 61
End: 2021-03-09
Payer: MEDICAID

## 2021-03-09 VITALS
RESPIRATION RATE: 16 BRPM | BODY MASS INDEX: 44.41 KG/M2 | OXYGEN SATURATION: 99 % | HEART RATE: 70 BPM | WEIGHT: 293 LBS | HEIGHT: 68 IN

## 2021-03-09 DIAGNOSIS — S59.911A INJURY OF RIGHT FOREARM, INITIAL ENCOUNTER: Primary | ICD-10-CM

## 2021-03-09 DIAGNOSIS — S50.11XA CONTUSION OF RIGHT FOREARM, INITIAL ENCOUNTER: ICD-10-CM

## 2021-03-09 PROCEDURE — 99203 OFFICE O/P NEW LOW 30 MIN: CPT | Performed by: PHYSICIAN ASSISTANT

## 2021-03-09 ASSESSMENT — ENCOUNTER SYMPTOMS
EYES NEGATIVE: 1
RESPIRATORY NEGATIVE: 1
GASTROINTESTINAL NEGATIVE: 1
ALLERGIC/IMMUNOLOGIC NEGATIVE: 1

## 2021-03-09 NOTE — PATIENT INSTRUCTIONS
Continue nonweight-bearing of the right arm  No pushing, pulling or lifting with the right arm  Ice and elevate as needed. Fitted for excos brace today  Pain medications per pain management  MRI ordered today. Follow up in clinic once MRI is completed.

## 2021-03-09 NOTE — PROGRESS NOTES
Patient had a fall down the stairs trying to catch her grandchild. Patient states that she fell directly onto wrist. Patients states that she has been using Vicodin for a pain relief that her pain management has given her for her back pain.  Patient states that it very painful on the wrist.

## 2021-03-09 NOTE — PROGRESS NOTES
[address] [place] [address] and Sports Medicine    HPI:  Husam Gonzales is a 61 y.o. female who presents for a right forearm injury. Patient states she had a fall on 3/8/2021 and she fell down her porch steps chasing her granddaughter. She states her pain is on the mid forearm of her right arm. She states she does note some pain of the ulnar side of the right wrist and elbow region. She states she follows with pain management has been taking Vicodin. Patient states the Vicodin does not help much. She states moving her forearm worsens her pain. Patient states she is right-hand dominant. She denies any past history of surgeries on her right forearm. Past Medical History:   Diagnosis Date    Arthritis     Asthma     B[facility] 1 disorder ([facility]     COPD (chronic obstructive pulmonary disease) ([facility]     Depression     Diabetes mellitus (HCC)     Edema     Glaucoma     History of Doppler ultrasound 03/26/2018    Arterial-bilateral VERONICA's show normal arterial flow. No signif occlusive arterial disease.     History of nuclear stress test 02/22/2018    cardiolite-EF45%,normal    Hx of Doppler echocardiogram 02/22/2018    EF50-60%,no valvular disease    Hyperlipidemia     Hypertension     IBS (irritable bowel syndrome)        Past Surgical History:   Procedure Laterality Date    TUBAL LIGATION         Family History   Problem Relation Age of Onset    Cancer Mother     Diabetes Mother     Heart Disease Mother     Cancer Father     High Blood Pressure Father     Cancer Other     Cancer Other     Diabetes Sister     Heart Disease Sister     High Blood Pressure Brother     Depression Sister        Social History     Socioeconomic History    Marital status: Legally      Spouse name: None    Number of children: None    Years of education: None    Highest education level: None   Occupational History    None   Social Needs    Financial resource strain: None    Food insecurity     Worry: None Inability: None    Transportation needs     Medical: None     Non-medical: None   Tobacco Use    Smoking status: Former Smoker    Smokeless tobacco: Never Used   Substance and Sexual Activity    Alcohol use: No    Drug use: No    Sexual activity: None   Lifestyle    Physical activity     Days per week: None     Minutes per session: None    Stress: None   Relationships    Social connections     Talks on phone: None     Gets together: None     Attends Hinduism service: None     Active member of club or organization: None     Attends meetings of clubs or organizations: None     Relationship status: None    Intimate partner violence     Fear of current or ex partner: None     Emotionally abused: None     Physically abused: None     Forced sexual activity: None   Other Topics Concern    None   Social History Narrative    None       Current Outpatient Medications   Medication Sig Dispense Refill    bacitracin-neomycin-polymyxin b-hydrocortisone 1 % ointment Apply topically 2 times daily. 1 Tube 0    amLODIPine-benazepril (LOTREL) 10-40 MG per capsule Take 1 capsule by mouth daily 30 capsule 5    ondansetron (ZOFRAN ODT) 4 MG disintegrating tablet Take 1 tablet by mouth every 8 hours as needed for Nausea 15 tablet 0    lidocaine (LIDODERM) 5 % Place 1 patch onto the skin daily 12 hours on, 12 hours off.  30 patch 0    RA ALCOHOL SWABS 70 % PADS use as directed three times a day  0    ibuprofen (ADVIL;MOTRIN) 600 MG tablet Take 1 tablet by mouth every 6 hours as needed for Pain 30 tablet 0    Pseudoephedrine-DM-GG 30- MG CAPS Take 1 tablet by mouth 2 times daily as needed (cough or congestion) 10 capsule 0    naproxen (NAPROSYN) 500 MG tablet Take 1 tablet by mouth 2 times daily 60 tablet 0    pravastatin (PRAVACHOL) 20 MG tablet take 1 tablet by mouth once daily 30 tablet 5    linagliptin (TRADJENTA) 5 MG tablet Take 1 tablet by mouth daily 30 tablet 3    metFORMIN (GLUCOPHAGE) 850 MG tablet Take 1 tablet by mouth 2 times daily (with meals) 60 tablet 3    insulin lispro (HUMALOG) 100 UNIT/ML injection vial Inject 30 Units into the skin 3 times daily (before meals) 1 vial 3    guaiFENesin (MUCINEX) 600 MG extended release tablet Take 1 tablet by mouth 2 times daily 20 tablet 0    mometasone-formoterol (DULERA) 100-5 MCG/ACT inhaler Inhale 2 puffs into the lungs 2 times daily 13 g 3    albuterol (PROVENTIL) (2.5 MG/3ML) 0.083% nebulizer solution   0    divalproex (DEPAKOTE) 250 MG DR tablet take 1 tablet by mouth twice a day  0    acetaminophen (AMINOFEN) 325 MG tablet Take 2 tablets by mouth every 6 hours as needed for Pain 60 tablet 0    dicyclomine (BENTYL) 20 MG tablet Take 20 mg by mouth every 6 hours      VENTOLIN  (90 Base) MCG/ACT inhaler   0    LUMIGAN 0.01 % SOLN ophthalmic drops Place 1 drop into both eyes daily   1    HYDROcodone-acetaminophen (NORCO) 5-325 MG per tablet 1 tablet 3 times daily as needed. 0    theophylline (THEODUR) 300 MG extended release tablet 1 tablet daily  0    pantoprazole (PROTONIX) 40 MG tablet Take 40 mg by mouth daily      furosemide (LASIX) 20 MG tablet take 1/2 tablet every few weeks  0    LYRICA 75 MG capsule take 1 capsule by mouth twice a day  0    ibuprofen (ADVIL;MOTRIN) 600 MG tablet Take 1 tablet by mouth every 8 hours as needed for Pain 30 tablet 0    Misc. Devices (CANE) MISC 1 Device by Does not apply route daily as needed. 1 each 0    DULoxetine (CYMBALTA) 60 MG capsule Take 60 mg by mouth daily.  insulin glargine (LANTUS) 100 UNIT/ML injection Inject 50 Units into the skin nightly        No current facility-administered medications for this visit.         Allergies   Allergen Reactions    Codeine     Adhesive Tape Itching    Toradol [Ketorolac Tromethamine] Other (See Comments)     Headache for 5-6 days       Vitals:    03/09/21 0837   Pulse: 70   Resp: 16   SpO2: 99%   Weight: 296 lb (134.3 kg)   Height: 5' 8\" (1.727 m) Review of System:  Review of Systems   Constitutional: Negative. HENT: Negative. Eyes: Negative. Respiratory: Negative. Cardiovascular: Negative. Gastrointestinal: Negative. Endocrine: Negative. Genitourinary: Negative. Musculoskeletal: Positive for arthralgias. Skin: Negative. Allergic/Immunologic: Negative. Neurological: Negative. Hematological: Negative. Psychiatric/Behavioral: Negative. Physical Exam:   Gen/Psych:Examination reveals a pleasant individual in no acute distress. The patient is oriented to time, place and person. The patient's mood and affect are appropriate. Patient appears well nourished. Head: Head is atraumatic normocephalic,  ears are symmetric. Eyes show equal pupils bilaterally, extraocular muscles intact. Hearing is intact. Lymph: No obvious lymphedema in right upper extremity. Skin: Intact in rigt upper extremity with no rash or erythema. Vascular: There are no obvious varicosities in right upper extremity, sensation intact to light touch over right upper extremity. Capillary refill less than 3. Radial pulses intact and equal bilaterally. Musculoskeletal:  Right Wrist/forearm exam  Inspection: Superficial abrasions over the ulnar aspect of the right forearm seen. No drainage notes. No streaking erythema noted. Contusion noted over the ulnar aspect of the right forearm. Mild swelling noted. Palpation: Tenderness to palpation over the proximal mid radius and ulna region. No snuffbox tenderness. Mild tenderness over the ulnar aspects of the distal wrist region. Mild tenderness over the distal elbow region. Nontender palpation of medial and lateral epicondyles. Elbow active range of motion:   Extension: 5 degrees short of full extension   Flexion: 100 degrees  The patient can perform a thumbs up, touch each finger to thumb, perform an okay sign, abduct and adduct fingers, and perform .    Capillary refill less than 3, radial pulses equal and intact bilaterally  Strength not tested due to suspected fracture      Outside Record review: ER Provider notes and x-rays from 3/3/2021 were reviewed. Impression   Possible nondisplaced fracture radial diaphysis         Imaging: No new images obtained today. Assessment:   1. Right forearm injury  2. Contusion of right forearm    Plan:  The patient was seen in clinic today for evaluation of her right forearm injury. Patient states she had a fall down her porch steps and and injured her right forearm. Patient states she has had a bump on the ulnar aspect of her right forearm. On physical exam, patient was tender to palpation over the mid forearm region as well as over the ulnar aspect of the right wrist.  Soft compartments. Radial pulse equal intact bilaterally. Sensation intact to light touch over the right upper extremity. patient went ER on 3/3/2021 and x-rays were obtained which showed a possible nondisplaced fracture of the radial shaft. Due to questionable fracture of the patient's right radius and tenderness palpation over the ulna, MRI was ordered for further evaluation of the possible fracture. Patient was placed in an Exos brace in the office today. Capillary refill remained less than 3 and sensation intact to light touch of the hand post brace application. The patient will follow up in clinic once her MRI has been completed. Continue nonweight-bearing of the right arm  No pushing, pulling or lifting with the right arm  Ice and elevate as needed. Fitted for excos brace today  Pain medications per pain management  MRI ordered today. Follow up in clinic once MRI is completed. Please note this report has been partially produced using speech recognition Dragon software and may contain errors related to that system including errors in grammar, punctuation, and spelling, as well as words and phrases that may be inappropriate.  If there are any questions or concerns please feel free to contact the dictating provider for clarification

## 2021-03-11 DIAGNOSIS — I10 ESSENTIAL HYPERTENSION: Primary | ICD-10-CM

## 2021-03-11 RX ORDER — AMLODIPINE BESYLATE AND BENAZEPRIL HYDROCHLORIDE 10; 40 MG/1; MG/1
1 CAPSULE ORAL DAILY
Qty: 30 CAPSULE | Refills: 0 | Status: SHIPPED | OUTPATIENT
Start: 2021-03-11 | End: 2021-04-27 | Stop reason: SDUPTHER

## 2021-03-22 ENCOUNTER — HOSPITAL ENCOUNTER (OUTPATIENT)
Dept: MRI IMAGING | Age: 61
Discharge: HOME | End: 2021-03-22
Payer: MEDICAID

## 2021-03-22 DIAGNOSIS — S50.11XA CONTUSION OF RIGHT FOREARM, INITIAL ENCOUNTER: ICD-10-CM

## 2021-03-22 DIAGNOSIS — S59.911A INJURY OF RIGHT FOREARM, INITIAL ENCOUNTER: ICD-10-CM

## 2021-03-22 PROCEDURE — 73218 MRI UPPER EXTREMITY W/O DYE: CPT

## 2021-03-24 ENCOUNTER — OFFICE VISIT (OUTPATIENT)
Dept: ORTHOPEDIC SURGERY | Age: 61
End: 2021-03-24
Payer: MEDICAID

## 2021-03-24 VITALS
OXYGEN SATURATION: 95 % | WEIGHT: 286 LBS | HEIGHT: 68 IN | RESPIRATION RATE: 14 BRPM | BODY MASS INDEX: 43.35 KG/M2 | HEART RATE: 72 BPM

## 2021-03-24 DIAGNOSIS — S50.11XD CONTUSION OF RIGHT FOREARM, SUBSEQUENT ENCOUNTER: Primary | ICD-10-CM

## 2021-03-24 PROCEDURE — 99213 OFFICE O/P EST LOW 20 MIN: CPT | Performed by: PHYSICIAN ASSISTANT

## 2021-03-24 ASSESSMENT — ENCOUNTER SYMPTOMS
EYES NEGATIVE: 1
ALLERGIC/IMMUNOLOGIC NEGATIVE: 1
GASTROINTESTINAL NEGATIVE: 1
RESPIRATORY NEGATIVE: 1

## 2021-03-24 NOTE — PROGRESS NOTES
Patient is here today for her right wrist injury. She states that the radial aspect of her wrist is slowly resolving. She is still experiencing psin mid forearm. She has been wearing her EXO brace as directed and removing for skin checks and hygiene. Pain level 4-5/10 describing it as aching and at times sharp. Pain is controlled with Tylenol.

## 2021-03-24 NOTE — PATIENT INSTRUCTIONS
Weight-bear as tolerated  Continue range of motion as tolerated  Discontinue brace  Take Tylenol as needed for pain   Continue follow-up with pain management  Return to the clinic as needed. If you have any worsening pain or any other concerns, please call the office.

## 2021-03-24 NOTE — PROGRESS NOTES
[address] Sports Medicine    HPI:  Rashad Morgan is a [age] [facility] y.o. female who presents for follow-up of her MRI of her right radius and ulna. Patient initially had a fall on 3/3/2021 as she fell down her porch steps. Patient rates her pain today 4/10 and describes it as an achy sensation. She states her swelling has improved. She states she has noted improvement of her pain. Patient states that she takes her Vicodin prescribed her pain management on occasion and has also used Tylenol. Patient states she did bump her arm today and had a fall recently but would like defer x-rays today. She states she does have some pain radiation to her hand but feels it is related to her arthritis. Past Medical History:   Diagnosis Date    Arthritis     Asthma     Bipolar 1 disorder ([facility] [address]     COPD (chronic obstructive pulmonary disease) ([facility] [address] [facility]     Depression     Diabetes mellitus (HCC)     Edema     Glaucoma     History of Doppler ultrasound 03/26/2018    Arterial-bilateral VERONICA's show normal arterial flow. No signif occlusive arterial disease.     History of nuclear stress test 02/22/2018    cardiolite-EF45%,normal    Hx of Doppler echocardiogram 02/22/2018    EF50-60%,no valvular disease    Hyperlipidemia     Hypertension     IBS (irritable bowel syndrome)        Past Surgical History:   Procedure Laterality Date    TUBAL LIGATION         Family History   Problem Relation Age of Onset    Cancer Mother     Diabetes Mother     Heart Disease Mother     Cancer Father     High Blood Pressure Father     Cancer Other     Cancer Other     Diabetes Sister     Heart Disease Sister     High Blood Pressure Brother     Depression Sister        Social History     Socioeconomic History    Marital status: Legally      Spouse name: None    Number of children: None    Years of education: None    Highest education level: None   Occupational History    None   Social Needs    Financial resource strain: None    Food insecurity     Worry: None     Inability: None    Transportation needs     Medical: None     Non-medical: None   Tobacco Use    Smoking status: Former Smoker    Smokeless tobacco: Never Used   Substance and Sexual Activity    Alcohol use: No    Drug use: No    Sexual activity: None   Lifestyle    Physical activity     Days per week: None     Minutes per session: None    Stress: None   Relationships    Social connections     Talks on phone: None     Gets together: None     Attends Restorationism service: None     Active member of club or organization: None     Attends meetings of clubs or organizations: None     Relationship status: None    Intimate partner violence     Fear of current or ex partner: None     Emotionally abused: None     Physically abused: None     Forced sexual activity: None   Other Topics Concern    None   Social History Narrative    None       Current Outpatient Medications   Medication Sig Dispense Refill    amLODIPine-benazepril (LOTREL) 10-40 MG per capsule Take 1 capsule by mouth daily 30 capsule 0    bacitracin-neomycin-polymyxin b-hydrocortisone 1 % ointment Apply topically 2 times daily. 1 Tube 0    ondansetron (ZOFRAN ODT) 4 MG disintegrating tablet Take 1 tablet by mouth every 8 hours as needed for Nausea 15 tablet 0    lidocaine (LIDODERM) 5 % Place 1 patch onto the skin daily 12 hours on, 12 hours off.  30 patch 0    RA ALCOHOL SWABS 70 % PADS use as directed three times a day  0    ibuprofen (ADVIL;MOTRIN) 600 MG tablet Take 1 tablet by mouth every 6 hours as needed for Pain 30 tablet 0    Pseudoephedrine-DM-GG 30- MG CAPS Take 1 tablet by mouth 2 times daily as needed (cough or congestion) 10 capsule 0    naproxen (NAPROSYN) 500 MG tablet Take 1 tablet by mouth 2 times daily 60 tablet 0    pravastatin (PRAVACHOL) 20 MG tablet take 1 tablet by mouth once daily 30 tablet 5    linagliptin (TRADJENTA) 5 MG tablet Take 1 tablet by mouth daily 30 tablet 3    metFORMIN (GLUCOPHAGE) 850 MG tablet Take 1 tablet by mouth 2 times daily (with meals) 60 tablet 3    insulin lispro (HUMALOG) 100 UNIT/ML injection vial Inject 30 Units into the skin 3 times daily (before meals) 1 vial 3    guaiFENesin (MUCINEX) 600 MG extended release tablet Take 1 tablet by mouth 2 times daily 20 tablet 0    mometasone-formoterol (DULERA) 100-5 MCG/ACT inhaler Inhale 2 puffs into the lungs 2 times daily 13 g 3    albuterol (PROVENTIL) (2.5 MG/3ML) 0.083% nebulizer solution   0    divalproex (DEPAKOTE) 250 MG DR tablet take 1 tablet by mouth twice a day  0    acetaminophen (AMINOFEN) 325 MG tablet Take 2 tablets by mouth every 6 hours as needed for Pain 60 tablet 0    dicyclomine (BENTYL) 20 MG tablet Take 20 mg by mouth every 6 hours      VENTOLIN  (90 Base) MCG/ACT inhaler   0    LUMIGAN 0.01 % SOLN ophthalmic drops Place 1 drop into both eyes daily   1    HYDROcodone-acetaminophen (NORCO) 5-325 MG per tablet 1 tablet 3 times daily as needed. 0    theophylline (THEODUR) 300 MG extended release tablet 1 tablet daily  0    pantoprazole (PROTONIX) 40 MG tablet Take 40 mg by mouth daily      furosemide (LASIX) 20 MG tablet take 1/2 tablet every few weeks  0    LYRICA 75 MG capsule take 1 capsule by mouth twice a day  0    ibuprofen (ADVIL;MOTRIN) 600 MG tablet Take 1 tablet by mouth every 8 hours as needed for Pain 30 tablet 0    Misc. Devices (CANE) MISC 1 Device by Does not apply route daily as needed. 1 each 0    DULoxetine (CYMBALTA) 60 MG capsule Take 60 mg by mouth daily.  insulin glargine (LANTUS) 100 UNIT/ML injection Inject 50 Units into the skin nightly        No current facility-administered medications for this visit.         Allergies   Allergen Reactions    Codeine     Adhesive Tape Itching    Toradol [Ketorolac Tromethamine] Other (See Comments)     Headache for 5-6 days       Vitals:    03/24/21 0905   Pulse: 72   Resp: 14   SpO2: 95% Weight: 286 lb (129.7 kg)   Height: 5' 8\" (1.727 m)     Review of System:  Review of Systems   Constitutional: Negative. HENT: Negative. Eyes: Negative. Respiratory: Negative. Cardiovascular: Negative. Gastrointestinal: Negative. Endocrine: Negative. Genitourinary: Negative. Musculoskeletal: Positive for arthralgias. Skin: Negative. Allergic/Immunologic: Negative. Neurological: Negative. Hematological: Negative. Psychiatric/Behavioral: Negative. Physical Exam:   Gen/Psych:Examination reveals a pleasant individual in no acute distress. The patient is oriented to time, place and person. The patient's mood and affect are appropriate. Patient appears well nourished. Head: Head is atraumatic normocephalic,  ears are symmetric. Eyes show equal pupils bilaterally, extraocular muscles intact. Hearing is intact. Lymph: No obvious lymphedema in left upper extremity. Skin: Intact in left upper extremity with no ulcerations, lesions, rash, erythema. Vascular: There are no obvious varicosities in left upper extremity, sensation intact to light touch over left upper extremity. Capillary refill less than 3. Radial pulses intact and equal bilaterally. Musculoskeletal:  Right Wrist/forearm exam  Inspection:. Small contusion noted over the ulnar aspect of the right forearm. No drainage noted. No streaking erythema noted. Minimal swelling noted. Palpation: mild tenderness to palpation over the proximal mid radius and ulna region. No snuffbox tenderness. Nontender to palpation of the posterior elbow region. Soft compartments  Elbow active range of motion:              Extension: 5 degrees short of full extension              Flexion: 130 degrees  Patient can supinate and pronate with no difficulty the right forearm with no difficulty. The patient can perform a thumbs up, touch each finger to thumb, perform an okay sign, abduct and adduct fingers, and perform .  strength 5/5  Capillary refill less than 3, radial pulses equal and intact bilaterally. Imaging:   MRI imaging of the patient's right radius and ulna was obtained. The official read and interpretation of the MRI was done by the the [facility]. Please see their impression below. Impression   1. No acute abnormality in the forearm.  No bone contusion or fracture.         Assessment:   1. Contusion of right forearm    Plan:  The patient was seen in clinic today for up of her right radius and ulna MRI. Patient initially had a fall on 3/3/2021 and previous x-ray it was noted to have a displaced fracture radial diaphysis. MRI was ordered at the last visit and was reviewed with the patient today. MRI report indicated no acute abnormality in the forearm, no bone contusion or fracture. On physical exam, patient was mildly tender proximal forearm region. Radial pulse equal intact bilaterally. Capillary refill less than 3. Sensation intact to light touch. Patient states she recently did have fall and did bump her arm accidentally. X-ray imaging was offered to the patient but patient states she would like to defer x-ray imaging at this time. Due to patient's improvement in pain, the patient will follow up as needed. I informed the patient that if her pain worsens or she has any concerns that she call the office. Weight-bear as tolerated  Continue range of motion as tolerated  Discontinue brace  Take Tylenol as needed for pain   Continue follow-up with pain management  Return to the clinic as needed. If you have any worsening pain or any other concerns, please call the office. Please note this report has been partially produced using speech recognition Dragon software and may contain errors related to that system including errors in grammar, punctuation, and spelling, as well as words and phrases that may be inappropriate.  If there are any questions or concerns please feel free to

## 2021-04-09 DIAGNOSIS — I10 ESSENTIAL HYPERTENSION: ICD-10-CM

## 2021-04-12 RX ORDER — AMLODIPINE BESYLATE AND BENAZEPRIL HYDROCHLORIDE 10; 40 MG/1; MG/1
1 CAPSULE ORAL DAILY
Qty: 30 CAPSULE | Refills: 0 | OUTPATIENT
Start: 2021-04-12

## 2021-04-14 ENCOUNTER — APPOINTMENT (OUTPATIENT)
Dept: GENERAL RADIOLOGY | Age: 61
End: 2021-04-14
Payer: MEDICAID

## 2021-04-14 ENCOUNTER — HOSPITAL ENCOUNTER (EMERGENCY)
Age: 61
Discharge: HOME | End: 2021-04-14
Payer: MEDICAID

## 2021-04-14 VITALS
HEIGHT: 68 IN | DIASTOLIC BLOOD PRESSURE: 97 MMHG | OXYGEN SATURATION: 98 % | TEMPERATURE: 98.4 F | BODY MASS INDEX: 44.41 KG/M2 | SYSTOLIC BLOOD PRESSURE: 162 MMHG | RESPIRATION RATE: 16 BRPM | WEIGHT: 293 LBS | HEART RATE: 80 BPM

## 2021-04-14 DIAGNOSIS — R07.9 RIGHT-SIDED CHEST PAIN: Primary | ICD-10-CM

## 2021-04-14 LAB
ALBUMIN SERPL-MCNC: 3.5 GM/DL (ref 3.4–5)
ALP BLD-CCNC: 72 IU/L (ref 40–129)
ALT SERPL-CCNC: 24 U/L (ref 10–40)
ANION GAP SERPL CALCULATED.3IONS-SCNC: 9 MMOL/L (ref 4–16)
AST SERPL-CCNC: 24 IU/L (ref 15–37)
BASOPHILS ABSOLUTE: 0 K/CU MM
BASOPHILS RELATIVE PERCENT: 0.1 % (ref 0–1)
BILIRUB SERPL-MCNC: 0.3 MG/DL (ref 0–1)
BUN BLDV-MCNC: 10 MG/DL (ref 6–23)
CALCIUM SERPL-MCNC: 9.2 MG/DL (ref 8.3–10.6)
CHLORIDE BLD-SCNC: 103 MMOL/L (ref 99–110)
CO2: 31 MMOL/L (ref 21–32)
CREAT SERPL-MCNC: 0.8 MG/DL (ref 0.6–1.1)
DIFFERENTIAL TYPE: ABNORMAL
EOSINOPHILS ABSOLUTE: 0.1 K/CU MM
EOSINOPHILS RELATIVE PERCENT: 1.8 % (ref 0–3)
GFR AFRICAN AMERICAN: >60 ML/MIN/1.73M2
GFR NON-AFRICAN AMERICAN: >60 ML/MIN/1.73M2
GLUCOSE BLD-MCNC: 186 MG/DL (ref 70–99)
HCT VFR BLD CALC: 43.3 % (ref 37–47)
HEMOGLOBIN: 13.7 GM/DL (ref 12.5–16)
IMMATURE NEUTROPHIL %: 0.3 % (ref 0–0.43)
LYMPHOCYTES ABSOLUTE: 2.1 K/CU MM
LYMPHOCYTES RELATIVE PERCENT: 28 % (ref 24–44)
MCH RBC QN AUTO: 27.3 PG (ref 27–31)
MCHC RBC AUTO-ENTMCNC: 31.6 % (ref 32–36)
MCV RBC AUTO: 86.3 FL (ref 78–100)
MONOCYTES ABSOLUTE: 0.5 K/CU MM
MONOCYTES RELATIVE PERCENT: 6.6 % (ref 0–4)
NUCLEATED RBC %: 0 %
PDW BLD-RTO: 13.9 % (ref 11.7–14.9)
PLATELET # BLD: 221 K/CU MM (ref 140–440)
PMV BLD AUTO: 10.9 FL (ref 7.5–11.1)
POTASSIUM SERPL-SCNC: 3.9 MMOL/L (ref 3.5–5.1)
RBC # BLD: 5.02 M/CU MM (ref 4.2–5.4)
SEGMENTED NEUTROPHILS ABSOLUTE COUNT: 4.7 K/CU MM
SEGMENTED NEUTROPHILS RELATIVE PERCENT: 63.2 % (ref 36–66)
SODIUM BLD-SCNC: 143 MMOL/L (ref 135–145)
TOTAL IMMATURE NEUTOROPHIL: 0.02 K/CU MM
TOTAL NUCLEATED RBC: 0 K/CU MM
TOTAL PROTEIN: 6.9 GM/DL (ref 6.4–8.2)
TROPONIN T: <0.01 NG/ML
TROPONIN T: <0.01 NG/ML
WBC # BLD: 7.4 K/CU MM (ref 4–10.5)

## 2021-04-14 PROCEDURE — 71045 X-RAY EXAM CHEST 1 VIEW: CPT

## 2021-04-14 PROCEDURE — 93005 ELECTROCARDIOGRAM TRACING: CPT | Performed by: EMERGENCY MEDICINE

## 2021-04-14 PROCEDURE — 99284 EMERGENCY DEPT VISIT MOD MDM: CPT

## 2021-04-14 PROCEDURE — 85025 COMPLETE CBC W/AUTO DIFF WBC: CPT

## 2021-04-14 PROCEDURE — 36415 COLL VENOUS BLD VENIPUNCTURE: CPT

## 2021-04-14 PROCEDURE — 6370000000 HC RX 637 (ALT 250 FOR IP): Performed by: PHYSICIAN ASSISTANT

## 2021-04-14 PROCEDURE — 84484 ASSAY OF TROPONIN QUANT: CPT

## 2021-04-14 PROCEDURE — 93010 ELECTROCARDIOGRAM REPORT: CPT | Performed by: INTERNAL MEDICINE

## 2021-04-14 PROCEDURE — 80053 COMPREHEN METABOLIC PANEL: CPT

## 2021-04-14 RX ORDER — ACETAMINOPHEN 500 MG
500 TABLET ORAL EVERY 6 HOURS PRN
Qty: 20 TABLET | Refills: 0 | Status: SHIPPED | OUTPATIENT
Start: 2021-04-14

## 2021-04-14 RX ORDER — ACETAMINOPHEN 500 MG
1000 TABLET ORAL ONCE
Status: COMPLETED | OUTPATIENT
Start: 2021-04-14 | End: 2021-04-14

## 2021-04-14 RX ADMIN — ACETAMINOPHEN 1000 MG: 500 TABLET ORAL at 19:22

## 2021-04-14 ASSESSMENT — PAIN SCALES - GENERAL: PAINLEVEL_OUTOF10: 5

## 2021-04-14 NOTE — ED NOTES
Samuel delta troponin from patient's right median AC - 2 millimeters above previous venipuncture site.       Nilsa Ace  04/14/21 7807

## 2021-04-14 NOTE — ED PROVIDER NOTES
As the Remote Tele physician-in-triage, I performed a medical screening history and physical exam on this patient remotely via the [id number] is a 61 y.o. female with right-sided chest pain that is worse with movement. She believes that this pain is muscular. Denies any recent injury. Had similar pain 3 weeks ago. Denies shortness of breath or cough. PHYSICAL EXAM  BP (!) 155/99   Pulse 81   Temp 98.4 °F (36.9 °C) (Oral)   Resp 22   Ht 5' 8\" (1.727 m)   Wt 296 lb (134.3 kg)   SpO2 96%   BMI 45.01 kg/m²     On exam, the patient appears in no acute distress. Speech is clear. Breathing is unlabored. Comment: Please note this report has been produced using speech recognition software and may contain errors related to that system including errors in grammar, punctuation, and spelling, as well as words and phrases that may be inappropriate. If there are any questions or concerns please feel free to contact the dictating provider for clarification.         Zainab Villegas MD  04/14/21 6518

## 2021-04-14 NOTE — ED PROVIDER NOTES
12 lead EKG per my interpretation:  Normal Sinus Rhythm at 87  [place] is   Normal  QTc is  within an acceptable range  There is no specific T wave changes appreciated. There is no specific ST wave changes appreciated. No STEMI    Prior EKG to compare with was available and sinus tachycardia seen on prior.     Allie Garcia MD  04/14/21 2929

## 2021-04-14 NOTE — ED PROVIDER NOTES
EMERGENCY DEPARTMENT ENCOUNTER        PCP: Colt De La O MD    [facility]    Chief Complaint   Patient presents with    Chest Pain     reports feels like a pulled muscle on the right side of chest       This patient was not evaluated by the attending physician. I have independently evaluated this patient. My supervising physician was available for consultation. ABHISHEK Yanez is a 61 y.o. female former smoker with PMH of HTN, HLD, DM type 2 who presents with chest pain. Onset - 3 wks ago but began again this morning. Patient reports that she noticed it when she woke up it was hurting. Location -right side of chest  Duration -intermittent, patient reports only occurs with certain movements of her arm or chest  Character -sharp  Aggravating/Alleviating factors -aggravating factors are certain movements seem to cause pain. Alleviating factor is resting. Associate symptoms - none  Radiation - does not radiate  Severity - 5/10 when it occurs    Patient reports that 3 weeks ago she had the same type of pain. She went to her PCP who thought it was a pulled muscle and began her on a pain medication. Patient reports that the pain lasted 7 days but then resolved until it recurred again today. Patient is unsure what pain medication she is taking for it. Patient has been taking care of her 12month-old grandchild lately and frequently lifting the child up and down. [employer]    Patient reports cardiologist is Dr. Stephanie Bang and patient last had a normal stress test in February of 2018. Patient denies fever, chills, diaphoresis, nausea, vomiting, back pain, ripping or tearing pain, numbness, weakness, tingling. Patient reports chronic shortness of breath with her COPD that is unchanged from usual and does not worsen when chest pain occurs.  Patient denies lower extremity swelling, recent long travel, history of PE/DVT, exogenous hormone use, recent surgeries/hospitalizations, recent trauma, hemoptysis. REVIEW OF SYSTEMS    Constitutional:  Denies diaphoresis, fever, chills  HENT:  Denies sore throat or ear pain   Cardiovascular:  +Chest Pain; Denies palpitations  Respiratory:  + SOB; Denies cough, hemoptysis    GI:  Denies abdominal pain, nausea, vomiting, or diarrhea  :  Denies any urinary symptoms  Musculoskeletal:  Denies back pain, extremity swelling  Skin:  Denies rash  Neurologic:  Denies syncope, lightheadedness, dizziness, headache, focal weakness or sensory changes   Endocrine:  Denies polyuria or polydypsia   Lymphatic:  Denies swollen glands     All other review of systems are negative  See HPI and nursing notes for additional information         PAST MEDICAL & SURGICAL HISTORY    Past Medical History:   Diagnosis Date    Arthritis     Asthma     [facility]     COPD (chronic obstructive pulmonary disease) [facility]     Depression     Diabetes mellitus ([facility]     Edema     Glaucoma     History of Doppler ultrasound 03/26/2018    Arterial-bilateral VERONICA's show normal arterial flow. No signif occlusive arterial disease.  History of nuclear stress test 02/22/2018    cardiolite-EF45%,normal    Hx of Doppler echocardiogram 02/22/2018    EF50-60%,no valvular disease    Hyperlipidemia     Hypertension     IBS (irritable bowel syndrome)      Past Surgical History:   Procedure Laterality Date    TUBAL LIGATION         CURRENT MEDICATIONS    Current Outpatient Rx   Medication Sig Dispense Refill    acetaminophen (APAP EXTRA STRENGTH) 500 MG tablet Take 1 tablet by mouth every 6 hours as needed for Pain 20 tablet 0    amLODIPine-benazepril (LOTREL) 10-40 MG per capsule Take 1 capsule by mouth daily 30 capsule 0    bacitracin-neomycin-polymyxin b-hydrocortisone 1 % ointment Apply topically 2 times daily.  1 Tube 0    ondansetron (ZOFRAN ODT) 4 MG disintegrating tablet Take 1 tablet by mouth every 8 hours as needed for Nausea 15 tablet 0    lidocaine (LIDODERM) 5 % Place 1 patch onto the skin daily 12 hours on, 12 hours off. 30 patch 0    RA ALCOHOL SWABS 70 % PADS use as directed three times a day  0    ibuprofen (ADVIL;MOTRIN) 600 MG tablet Take 1 tablet by mouth every 6 hours as needed for Pain 30 tablet 0    Pseudoephedrine-DM-GG 30- MG CAPS Take 1 tablet by mouth 2 times daily as needed (cough or congestion) 10 capsule 0    naproxen (NAPROSYN) 500 MG tablet Take 1 tablet by mouth 2 times daily 60 tablet 0    pravastatin (PRAVACHOL) 20 MG tablet take 1 tablet by mouth once daily 30 tablet 5    linagliptin (TRADJENTA) 5 MG tablet Take 1 tablet by mouth daily 30 tablet 3    metFORMIN (GLUCOPHAGE) 850 MG tablet Take 1 tablet by mouth 2 times daily (with meals) 60 tablet 3    insulin lispro (HUMALOG) 100 UNIT/ML injection vial Inject 30 Units into the skin 3 times daily (before meals) 1 vial 3    guaiFENesin (MUCINEX) 600 MG extended release tablet Take 1 tablet by mouth 2 times daily 20 tablet 0    mometasone-formoterol (DULERA) 100-5 MCG/ACT inhaler Inhale 2 puffs into the lungs 2 times daily 13 g 3    albuterol (PROVENTIL) (2.5 MG/3ML) 0.083% nebulizer solution   0    divalproex (DEPAKOTE) 250 MG DR tablet take 1 tablet by mouth twice a day  0    dicyclomine (BENTYL) 20 MG tablet Take 20 mg by mouth every 6 hours      VENTOLIN  (90 Base) MCG/ACT inhaler   0    LUMIGAN 0.01 % SOLN ophthalmic drops Place 1 drop into both eyes daily   1    HYDROcodone-acetaminophen (NORCO) 5-325 MG per tablet 1 tablet 3 times daily as needed. 0    theophylline (THEODUR) 300 MG extended release tablet 1 tablet daily  0    pantoprazole (PROTONIX) 40 MG tablet Take 40 mg by mouth daily      furosemide (LASIX) 20 MG tablet take 1/2 tablet every few weeks  0    LYRICA 75 MG capsule take 1 capsule by mouth twice a day  0    ibuprofen (ADVIL;MOTRIN) 600 MG tablet Take 1 tablet by mouth every 8 hours as needed for Pain 30 tablet 0    Misc.  Devices (CANE) MISC 1 Device by Does not apply route daily as needed. 1 each 0    DULoxetine (CYMBALTA) 60 MG capsule Take 60 mg by mouth daily.       insulin glargine (LANTUS) 100 UNIT/ML injection Inject 50 Units into the skin nightly          ALLERGIES    Allergies   Allergen Reactions    Codeine     Adhesive Tape Itching    Toradol [Ketorolac Tromethamine] Other (See Comments)     Headache for 5-6 days       SOCIAL & FAMILY HISTORY    Social History     Socioeconomic History    Marital status: Legally      Spouse name: Not on file    Number of children: Not on file    Years of education: Not on file    Highest education level: Not on file   Occupational History    Not on file   Social Needs    Financial resource strain: Not on file    Food insecurity     Worry: Not on file     Inability: Not on file    Transportation needs     Medical: Not on file     Non-medical: Not on file   Tobacco Use    Smoking status: Former Smoker    Smokeless tobacco: Never Used   Substance and Sexual Activity    Alcohol use: No    Drug use: No    Sexual activity: Not on file   Lifestyle    Physical activity     Days per week: Not on file     Minutes per session: Not on file    Stress: Not on file   Relationships    Social connections     Talks on phone: Not on file     Gets together: Not on file     Attends Buddhism service: Not on file     Active member of club or organization: Not on file     Attends meetings of clubs or organizations: Not on file     Relationship status: Not on file    Intimate partner violence     Fear of current or ex partner: Not on file     Emotionally abused: Not on file     Physically abused: Not on file     Forced sexual activity: Not on file   Other Topics Concern    Not on file   Social History Narrative    Not on file     Family History   Problem Relation Age of Onset    Cancer Mother     Diabetes Mother     Heart Disease Mother     Cancer Father     High Blood Pressure Father     Cancer Other     Cancer Other     Diabetes Sister     Heart Disease Sister     High Blood Pressure Brother     Depression Sister        PHYSICAL EXAM    VITAL SIGNS: BP (!) 155/99   Pulse 81   Temp 98.4 °F (36.9 °C) (Oral)   Resp 22   Ht 5' 8\" (1.727 m)   Wt 296 lb (134.3 kg)   SpO2 96%   BMI 45.01 kg/m²    Constitutional:  Well developed, well nourished, no acute distress   HENT:  Atraumatic, moist mucus membranes  Neck/Lymphatics: supple, no JVD, no swollen nodes  Respiratory:  Lungs Clear, no retractions, no wheezing, rhonchi, rales, no accessory muscle usage  Cardiovascular:  Rate regular, regular Rhythm, no murmurs/rubs/gallops. No carotid bruits or murmurs heard in carotids. There is right anterior mid chest wall tenderness with palpation that reproduces patient's pain. Pain is also reproducible with raising right arm overhead against resistance and crossing the right arm to left side of chest against resistance. Vascular: Radial and DP pulses 2+ equal bilaterally  GI:  Soft, nontender, normal bowel sounds  Musculoskeletal:  No acute gross deformities. Compartments are soft in bilateral lower extremities. No calf or thigh tenderness to palpation bilaterally and no lower extremity edema bilaterally.   Integument:  Skin warm and dry, no petechiae   Neurologic:  Alert & oriented, normal speech  Psych: Pleasant affect, no hallucinations        LABS:  Results for orders placed or performed during the hospital encounter of 04/14/21   CBC auto diff   Result Value Ref Range    WBC 7.4 4.0 - 10.5 K/CU MM    RBC 5.02 4.2 - 5.4 M/CU MM    Hemoglobin 13.7 12.5 - 16.0 GM/DL    Hematocrit 43.3 37 - 47 %    MCV 86.3 78 - 100 FL    MCH 27.3 27 - 31 PG    MCHC 31.6 (L) 32.0 - 36.0 %    RDW 13.9 11.7 - 14.9 %    Platelets 459 321 - 291 K/CU MM    MPV 10.9 7.5 - 11.1 FL    Differential Type AUTOMATED DIFFERENTIAL     Segs Relative 63.2 36 - 66 %    Lymphocytes % 28.0 24 - 44 %    Monocytes % 6.6 (H) 0 - 4 % Eosinophils % 1.8 0 - 3 %    Basophils % 0.1 0 - 1 %    Segs Absolute 4.7 K/CU MM    Lymphocytes Absolute 2.1 K/CU MM    Monocytes Absolute 0.5 K/CU MM    Eosinophils Absolute 0.1 K/CU MM    Basophils Absolute 0.0 K/CU MM    Nucleated RBC % 0.0 %    Total Nucleated RBC 0.0 K/CU MM    Total Immature Neutrophil 0.02 K/CU MM    Immature Neutrophil % 0.3 0 - 0.43 %   CMP   Result Value Ref Range    Sodium 143 135 - 145 MMOL/L    Potassium 3.9 3.5 - 5.1 MMOL/L    Chloride 103 99 - 110 mMol/L    CO2 31 21 - 32 MMOL/L    BUN 10 6 - 23 MG/DL    CREATININE 0.8 0.6 - 1.1 MG/DL    Glucose 186 (H) 70 - 99 MG/DL    Calcium 9.2 8.3 - 10.6 MG/DL    Albumin 3.5 3.4 - 5.0 GM/DL    Total Protein 6.9 6.4 - 8.2 GM/DL    Total Bilirubin 0.3 0.0 - 1.0 MG/DL    ALT 24 10 - 40 U/L    AST 24 15 - 37 IU/L    Alkaline Phosphatase 72 40 - 129 IU/L    GFR Non-African American >60 >60 mL/min/1.73m2    GFR African American >60 >60 mL/min/1.73m2    Anion Gap 9 4 - 16   Troponin   Result Value Ref Range    Troponin T <0.010 <0.01 NG/ML   Troponin   Result Value Ref Range    Troponin T <0.010 <0.01 NG/ML   EKG 12 Lead   Result Value Ref Range    Ventricular Rate 87 BPM    Atrial Rate 87 BPM    P-R Interval 146 ms    QRS Duration 88 ms    Q-T Interval 366 ms    QTc Calculation (Bazett) 440 ms    P Axis 42 degrees    R Axis -9 degrees    T Axis 84 degrees    Diagnosis       Normal sinus rhythm  Septal infarct , age undetermined  Abnormal ECG  When compared with ECG of 05-NOV-2020 20:52,  Vent. rate has decreased BY  52 BPM  Septal infarct is now present  Nonspecific T wave abnormality has replaced inverted T waves in Lateral leads  Confirmed by Julissa Barreto & Hugo Pham MD, Veronica Main ([id number]) on 4/14/2021 4:47:24 PM           EKG: EKG Interpretation  Please see ED physician's note for EKG interpretation- Dr. Sloane Gonzalez    RADIOLOGY/PROCEDURES    XR CHEST PORTABLE   Final Result   Minimal left basilar atelectasis. Otherwise, clear lungs.                  ED COURSE & MEDICAL DECISION MAKING       Vital signs and nursing notes reviewed during ED course. I have independently evaluated this patient. Supervising physician present in the Emergency Department, available for consultation, throughout entirety of patient care. Patient presents as above with right-sided chest pain that prompted work-up today. Patient placed on telemetry monitoring as well as continuous pulse oximetry monitoring. While in the ED today, labs, imaging, and EKG were obtained. For EKG interpretation- please see ED physician's note. Labs without emergent findings. Initial troponin is negative. Chest xray obtained today and reveals no acute cardiopulmonary process. No pneumothorax, no consolidation concerning for pneumonia, no pleural effusion. Pulses are equal in all extremities, no ripping or tearing pain, no widened mediastinum-low clinical suspicion for AAA/thoracic dissection. Heart Score for chest pain patients  History: Slightly Suspicious  ECG: Normal  Patient Age: > 39 and < 65 years  *Risk factors for Atherosclerotic disease: Diabetes Mellitus, Hypercholesterolemia, Hypertension, Obesity  Risk Factors: > 3 Risk factors or history of atherosclerotic disease*  Troponin: < 1X normal limit  Heart Score Total: 3- therefore 3 hour delta troponin obtained and is negative. Patient and I discussed low risk (<1%) of major adverse cardiac event within the next 6 wks. Patient's chest pain is reproducible on exam with usage of chest wall muscles. I do suspect musculoskeletal etiology of symptoms and patient treated with Tylenol in the ED for pain. Patient is comfortable with discharge home with close outpatient follow up with cardiologist out of abundance of caution and with PCP. Patient is nontoxic appearing. Vital signs are stable. All pertinent Lab data and radiographic results reviewed with patient at bedside.   The patient and/or the family were informed of the results of any tests/labs/imaging, the

## 2021-04-16 LAB
EKG ATRIAL RATE: 87 BPM
EKG DIAGNOSIS: NORMAL
EKG P AXIS: 42 DEGREES
EKG P-R INTERVAL: 146 MS
EKG Q-T INTERVAL: 366 MS
EKG QRS DURATION: 88 MS
EKG QTC CALCULATION (BAZETT): 440 MS
EKG R AXIS: -9 DEGREES
EKG T AXIS: 84 DEGREES
EKG VENTRICULAR RATE: 87 BPM

## 2021-04-27 DIAGNOSIS — I10 ESSENTIAL HYPERTENSION: ICD-10-CM

## 2021-04-27 RX ORDER — AMLODIPINE BESYLATE AND BENAZEPRIL HYDROCHLORIDE 10; 40 MG/1; MG/1
1 CAPSULE ORAL DAILY
Qty: 15 CAPSULE | Refills: 0 | Status: SHIPPED | OUTPATIENT
Start: 2021-04-27 | End: 2021-05-03 | Stop reason: SDUPTHER

## 2021-05-03 ENCOUNTER — OFFICE VISIT (OUTPATIENT)
Dept: CARDIOLOGY CLINIC | Age: 61
End: 2021-05-03
Payer: MEDICAID

## 2021-05-03 VITALS
HEIGHT: 68 IN | HEART RATE: 88 BPM | WEIGHT: 292.8 LBS | SYSTOLIC BLOOD PRESSURE: 138 MMHG | BODY MASS INDEX: 44.38 KG/M2 | DIASTOLIC BLOOD PRESSURE: 86 MMHG

## 2021-05-03 DIAGNOSIS — I10 ESSENTIAL HYPERTENSION: Primary | ICD-10-CM

## 2021-05-03 DIAGNOSIS — E78.5 DYSLIPIDEMIA: ICD-10-CM

## 2021-05-03 PROCEDURE — 1111F DSCHRG MED/CURRENT MED MERGE: CPT | Performed by: NURSE PRACTITIONER

## 2021-05-03 PROCEDURE — 99214 OFFICE O/P EST MOD 30 MIN: CPT | Performed by: NURSE PRACTITIONER

## 2021-05-03 RX ORDER — AMLODIPINE BESYLATE AND BENAZEPRIL HYDROCHLORIDE 10; 40 MG/1; MG/1
1 CAPSULE ORAL DAILY
Qty: 90 CAPSULE | Refills: 3 | Status: SHIPPED | OUTPATIENT
Start: 2021-05-03 | End: 2022-04-28

## 2021-05-03 NOTE — PROGRESS NOTES
[facility]  [facility] [address] [facility],[address]  Phone: [phone]    Fax [phone]    Sharad Marcelo MD, Simran Crespo MD, [address] Pang MD, MD Dipti Tay MD Myra Hemp, MD Marcey Lessen, MD Bernadette Panther, JOSE Gale, JOSE Flores, JOSE Saldaña, JOSE    CARDIOLOGY  NOTE    5/3/2021    David Amor (:  1960) is a 64 y.o. female,Established patient, here for evaluation of the following chief complaint(s):  Follow-Up from Hospital (Patient here to follow up from hospital for chest pain. She stated was getting something and felt like pulled muscle and second time it happen she just woke up with it. It comes and goes usualyl and is a sharp pain. She complains of shortness of breath but stated she has copd. She stated her feet were swollen becasuse she had to ride 3 hours somewhere for graduation and 3 hours back)        SUBJECTIVE/OBJECTIVE:    Patient is here to follow up on her HTN, dyslipidemia, and chest pain. Patient ws recently in the ED for chest pain. Patient thought she had pulled a muscle in her Left side/ chest. She reports that the pain does return intermittently, both at rest and with activity. The pain is not consistent and has not worsened. She has not taken anything to alleviate the pain and she states that the pain does not last more than 5 minutes. Patient feels that her shortness of breath is normal for her from her COPD. She does not exercise. She denies issues getting or taking her medications. Denies palpations, dizziness, orthopnea, swelling of the lower legs, or syncope. Review of Systems   Constitutional: Negative for fatigue and fever. Respiratory: Positive for shortness of breath. Negative for cough. Cardiovascular: Positive for chest pain. Negative for palpitations and leg swelling. Musculoskeletal: Positive for gait problem (uses a cane). (GLUCOPHAGE) 850 MG tablet Take 1 tablet by mouth 2 times daily (with meals) 2/26/19  Yes Sho Kirby MD   insulin lispro (HUMALOG) 100 UNIT/ML injection vial Inject 30 Units into the skin 3 times daily (before meals) 2/26/19  Yes Sho Kirby MD   guaiFENesin [facility]) 600 MG extended release tablet Take 1 tablet by mouth 2 times daily 2/26/19  Yes Sho Kirby MD   mometasone-formoterol [facility]) 100-5 MCG/ACT inhaler Inhale 2 puffs into the lungs 2 times daily 2/26/19  Yes Sho Kirby MD   albuterol (PROVENTIL) (2.5 MG/3ML) 0.083% nebulizer solution  2/21/19  Yes Historical Provider, MD   divalproex (DEPAKOTE) 250 MG DR tablet take 1 tablet by mouth twice a day 2/21/19  Yes Historical Provider, MD   dicyclomine (BENTYL) 20 MG tablet Take 20 mg by mouth every 6 hours   Yes Historical Provider, MD   VENTOLIN  (90 Base) MCG/ACT inhaler  2/27/18  Yes Historical Provider, MD BERMEO 0.01 % SOLN ophthalmic drops Place 1 drop into both eyes daily  1/22/18  Yes Historical Provider, MD   HYDROcodone-acetaminophen (NORCO) 5-325 MG per tablet 1 tablet 3 times daily as needed. 2/15/18  Yes Historical Provider, MD   theophylline (THEODUR) 300 MG extended release tablet 1 tablet daily 2/11/18  Yes Historical Provider, MD   pantoprazole (PROTONIX) 40 MG tablet Take 40 mg by mouth daily   Yes Historical Provider, MD   furosemide (LASIX) 20 MG tablet take 1/2 tablet every few weeks 1/16/18  Yes Historical Provider, MD   LYRICA 75 MG capsule take 1 capsule by mouth twice a day 1/18/18  Yes Historical Provider, MD   ibuprofen (ADVIL;MOTRIN) 600 MG tablet Take 1 tablet by mouth every 8 hours as needed for Pain 1/3/18  Yes Kiley Benítez PA-C   Misc. Devices (CANE) MISC 1 Device by Does not apply route daily as needed. 4/1/14  Yes Jamee Lloyd PA-C   DULoxetine (CYMBALTA) 60 MG capsule Take 60 mg by mouth daily.    Yes Historical Provider, MD   insulin glargine (LANTUS) 100 UNIT/ML injection Inject 50 ventricle structure and function.   Ejection fraction is visually estimated at 50-60%.  Grade I diastolic dysfunction.   No significant valvular disease noted.   No evidence of pericardial effusion.     Doppler 3/26/18           No evidence of significant occlusive arterial disease.    Bilateral VERONICA's show normal arterial flow. EKG 4/14/2021  Normal sinus rhythm   Septal infarct , age undetermined   Abnormal ECG   When compared with ECG of 05-NOV-2020 20:52,   Vent. rate has decreased BY  52 BPM   Septal infarct is now present   Nonspecific T wave abnormality has replaced inverted T waves in Lateral leads   Confirmed by Phil Nino MD, Gayle Wilkerson ([id number]) on 4/14/2021 4:47:24 PM     Note reeviewd from : 4/14/2021-Dr. Kady Blanton -ED    ASSESSMENT/PLAN:    1. Essential hypertension   Controlled   Continue lotrel,    advised low salt diet   -     amLODIPine-benazepril (LOTREL) 10-40 MG per capsule; Take 1 capsule by mouth daily, Disp-90 capsule, R-3Patient needs to schedule appointment before any more refills will be givenNormal  -     [place] DISCHARGE MEDS RECONCILED W/ CURRENT OUTPATIENT MED LIST  -     NM MYOCARDIAL SPECT REST EXERCISE OR RX; Future  -     ECHO Complete 2D W Doppler W Color; Future  2. Dyslipidemia   Lipid panel not able to be reviewed   Lipid panel ordered    Goal is not able to be determined   Continue pravastatin    Discussed with patient the importance of low cholesterol diet exercise, and medication adherence. -     Lipid Panel; Future  -     NM MYOCARDIAL SPECT REST EXERCISE OR RX; Future  -     ECHO Complete 2D W Doppler W Color; Future  3. Chest pain  EKG 4/14/2021 shows new septal infarct with t-wave abnormalities in lateral leads. Will check stress test and echo  Patient has risk factors, HTN, HLD, DM, obesity. Return in about 3 months (around 8/3/2021) for with Dr. Rose Wright, or temi if needed. An electronic signature was used to authenticate this note.     Electronically signed by Ale Hollingsworth, APRN - CNP on 5/3/2021 at 2:39 PM

## 2021-05-03 NOTE — PATIENT INSTRUCTIONS
Please hold on to these instructions the  will call you within 1-9 business days when we receive authorization from your insurance. Nuclear Stress Test    WHAT TO EXPECT:   ? You will need to confirm the test or it could be cancelled. ? This test will take approximately 2 hours: 1 hour in the AM &    1 hour in the PM. You will be given a time by the Technologist after the first part is completed to come back. ? You will be given a medication, through an IV in the hand, this will safely simulate exercise. This IV is also needed to inject the radioactive isotope unless you are able toe walk the treadmill. ? You will receive an injection in the AM & PM before the pictures. ? Using a special camera, you will have one set of pictures of your heart taken in the AM and a set of pictures in the PM.     PREPARATION FOR TEST:  ? Eat a light breakfast such as water or juice and toast.  ? If you are DIABETIC: Eat a normal breakfast with NO CAFFEINE and take your insulin as normal.   ? AVOID ALL FOODS & DRINKS containing CAFFEINE 12 HOURS PRIOR TO THE TEST: Including coffee, Tea, Milena and other soft drinks even those labeled  caffeine free or decaffeinated. Please hold on to these instructions the  will call you within 1-9 business days when we receive authorization from your insurance. Nuclear Stress Test    WHAT TO EXPECT:   ? You will need to confirm the test or it could be cancelled. ? This test will take approximately 2 hours: 1 hour in the AM &    1 hour in the PM. You will be given a time by the Technologist after the first part is completed to come back. ? You will be given a medication, through an IV in the hand, this will safely simulate exercise. This IV is also needed to inject the radioactive isotope unless you are able toe walk the treadmill. ? You will receive an injection in the AM & PM before the pictures. ?  Using a special camera, you will have one set of pictures of your heart taken in the AM and a set of pictures in the PM.     PREPARATION FOR TEST:  ? Eat a light breakfast such as water or juice and toast.  ? If you are DIABETIC: Eat a normal breakfast with NO CAFFEINE and take your insulin as normal.   ? AVOID ALL FOODS & DRINKS containing CAFFEINE 12 HOURS PRIOR TO THE TEST: Including coffee, Tea, Milena and other soft drinks even those labeled  caffeine free or decaffeinated.  ? HOLD THESE MEDICATIONS Persantine & Theophylline (Theodur)  24 hours prior & bring your inhaler with you.

## 2021-05-10 ENCOUNTER — HOSPITAL ENCOUNTER (OUTPATIENT)
Age: 61
Discharge: HOME | End: 2021-05-10
Payer: MEDICAID

## 2021-05-10 ENCOUNTER — TELEPHONE (OUTPATIENT)
Dept: CARDIOLOGY CLINIC | Age: 61
End: 2021-05-10

## 2021-05-10 LAB
CHOLESTEROL: 116 MG/DL
HDLC SERPL-MCNC: 41 MG/DL
LDL CHOLESTEROL DIRECT: 60 MG/DL
TRIGL SERPL-MCNC: 117 MG/DL

## 2021-05-10 PROCEDURE — 36415 COLL VENOUS BLD VENIPUNCTURE: CPT

## 2021-05-10 PROCEDURE — 80061 LIPID PANEL: CPT

## 2021-05-10 PROCEDURE — 83721 ASSAY OF BLOOD LIPOPROTEIN: CPT

## 2021-05-10 NOTE — TELEPHONE ENCOUNTER
----- Message from JOSE Pastor CNP sent at 5/10/2021  2:51 PM EDT -----  Kaye Ornelas cholesterol.  Continue current plan  ----- Message -----  From: [facility] Incoming Lab Results From [place] ([facility])  Sent: 5/10/2021   2:46 PM EDT  To: JOSE Pastor CNP

## 2021-05-14 ENCOUNTER — PROCEDURE VISIT (OUTPATIENT)
Dept: CARDIOLOGY CLINIC | Age: 61
End: 2021-05-14
Payer: MEDICAID

## 2021-05-14 DIAGNOSIS — E78.5 DYSLIPIDEMIA: ICD-10-CM

## 2021-05-14 DIAGNOSIS — R06.02 SHORTNESS OF BREATH: Primary | ICD-10-CM

## 2021-05-14 DIAGNOSIS — I10 ESSENTIAL HYPERTENSION: ICD-10-CM

## 2021-05-14 LAB
LV EF: 45 %
LVEF MODALITY: NORMAL

## 2021-05-14 PROCEDURE — 93018 CV STRESS TEST I&R ONLY: CPT | Performed by: INTERNAL MEDICINE

## 2021-05-14 PROCEDURE — A9500 TC99M SESTAMIBI: HCPCS | Performed by: INTERNAL MEDICINE

## 2021-05-14 PROCEDURE — 93016 CV STRESS TEST SUPVJ ONLY: CPT | Performed by: INTERNAL MEDICINE

## 2021-05-14 PROCEDURE — 78452 HT MUSCLE IMAGE SPECT MULT: CPT | Performed by: INTERNAL MEDICINE

## 2021-05-14 PROCEDURE — 93017 CV STRESS TEST TRACING ONLY: CPT | Performed by: INTERNAL MEDICINE

## 2021-05-16 PROBLEM — E78.5 DYSLIPIDEMIA: Status: ACTIVE | Noted: 2021-05-16

## 2021-05-16 ASSESSMENT — ENCOUNTER SYMPTOMS
SHORTNESS OF BREATH: 1
COUGH: 0

## 2021-05-21 ENCOUNTER — TELEPHONE (OUTPATIENT)
Dept: CARDIOLOGY CLINIC | Age: 61
End: 2021-05-21

## 2021-05-24 ENCOUNTER — PROCEDURE VISIT (OUTPATIENT)
Dept: CARDIOLOGY CLINIC | Age: 61
End: 2021-05-24
Payer: MEDICAID

## 2021-05-24 DIAGNOSIS — R06.02 SHORTNESS OF BREATH: Primary | ICD-10-CM

## 2021-05-24 DIAGNOSIS — I10 ESSENTIAL HYPERTENSION: ICD-10-CM

## 2021-05-24 DIAGNOSIS — E78.5 DYSLIPIDEMIA: ICD-10-CM

## 2021-05-24 LAB
LV EF: 48 %
LVEF MODALITY: NORMAL

## 2021-05-24 PROCEDURE — 93306 TTE W/DOPPLER COMPLETE: CPT | Performed by: INTERNAL MEDICINE

## 2021-05-25 ENCOUNTER — TELEPHONE (OUTPATIENT)
Dept: CARDIOLOGY CLINIC | Age: 61
End: 2021-05-25

## 2021-05-25 NOTE — TELEPHONE ENCOUNTER
----- Message from JOSE Gutierrez CNP sent at 5/24/2021  9:01 PM EDT -----  Normal stress  Echo does show a slight reduction in EF but not significant and since stress test is normal no further work up. Follow up as scheduled.  Remind patient to have 2 gram low sodium diet and encourage exercise  ----- Message -----  From: [facility] Incoming Cardiovascular Results From [facility]  Sent: 5/24/2021   6:15 PM EDT  To: JOSE Gutierrez CNP

## 2021-08-03 ENCOUNTER — OFFICE VISIT (OUTPATIENT)
Dept: CARDIOLOGY CLINIC | Age: 61
End: 2021-08-03
Payer: MEDICAID

## 2021-08-03 VITALS
HEART RATE: 80 BPM | SYSTOLIC BLOOD PRESSURE: 130 MMHG | DIASTOLIC BLOOD PRESSURE: 84 MMHG | HEIGHT: 68 IN | BODY MASS INDEX: 44.41 KG/M2 | WEIGHT: 293 LBS

## 2021-08-03 DIAGNOSIS — I10 ESSENTIAL HYPERTENSION: Primary | ICD-10-CM

## 2021-08-03 DIAGNOSIS — E78.5 DYSLIPIDEMIA: ICD-10-CM

## 2021-08-03 DIAGNOSIS — I73.9 CLAUDICATION (HCC): ICD-10-CM

## 2021-08-03 DIAGNOSIS — E11.9 TYPE 2 DIABETES MELLITUS WITHOUT COMPLICATION, WITHOUT LONG-TERM CURRENT USE OF INSULIN (HCC): ICD-10-CM

## 2021-08-03 DIAGNOSIS — R07.2 PRECORDIAL PAIN: ICD-10-CM

## 2021-08-03 PROCEDURE — 99214 OFFICE O/P EST MOD 30 MIN: CPT | Performed by: INTERNAL MEDICINE

## 2021-08-03 NOTE — PROGRESS NOTES
Units into the skin 3 times daily (before meals) 1 vial 3    guaiFENesin (MUCINEX) 600 MG extended release tablet Take 1 tablet by mouth 2 times daily 20 tablet 0    mometasone-formoterol (DULERA) 100-5 MCG/ACT inhaler Inhale 2 puffs into the lungs 2 times daily 13 g 3    albuterol (PROVENTIL) (2.5 MG/3ML) 0.083% nebulizer solution   0    divalproex (DEPAKOTE) 250 MG DR tablet take 1 tablet by mouth twice a day  0    dicyclomine (BENTYL) 20 MG tablet Take 20 mg by mouth every 6 hours      VENTOLIN  (90 Base) MCG/ACT inhaler   0    LUMIGAN 0.01 % SOLN ophthalmic drops Place 1 drop into both eyes daily   1    HYDROcodone-acetaminophen (NORCO) 5-325 MG per tablet 1 tablet 3 times daily as needed. 0    theophylline (THEODUR) 300 MG extended release tablet 1 tablet daily  0    pantoprazole (PROTONIX) 40 MG tablet Take 40 mg by mouth daily      furosemide (LASIX) 20 MG tablet take 1/2 tablet every few weeks  0    LYRICA 75 MG capsule take 1 capsule by mouth twice a day  0    ibuprofen (ADVIL;MOTRIN) 600 MG tablet Take 1 tablet by mouth every 8 hours as needed for Pain 30 tablet 0    Misc. Devices (CANE) MISC 1 Device by Does not apply route daily as needed. 1 each 0    DULoxetine (CYMBALTA) 60 MG capsule Take 60 mg by mouth daily.  insulin glargine (LANTUS) 100 UNIT/ML injection Inject 50 Units into the skin nightly        No current facility-administered medications for this visit. Allergies:   Codeine, Adhesive tape, and Toradol [ketorolac tromethamine]    Patient History:  Past Medical History:   Diagnosis Date    Arthritis     Asthma     Bipolar 1 disorder ([address]     COPD [facility]     Depression     Diabetes mellitus (HCC)     Edema     Glaucoma     History of Doppler ultrasound 03/26/2018    Arterial-bilateral VERONICA's show normal arterial flow. No signif occlusive arterial disease.     History of nuclear stress test 02/22/2018 cardiolite-EF45%,normal    Hx of Doppler echocardiogram 02/22/2018    EF50-60%,no valvular disease    Hyperlipidemia     Hypertension     IBS (irritable bowel syndrome)      Past Surgical History:   Procedure Laterality Date    TUBAL LIGATION       Family History   Problem Relation Age of Onset   Opal Me Cancer Mother     Diabetes Mother     Heart Disease Mother     Cancer Father     High Blood Pressure Father     Cancer Other     Cancer Other     Diabetes Sister     Heart Disease Sister     High Blood Pressure Brother     Depression Sister      Social History     Tobacco Use    Smoking status: Former Smoker    Smokeless tobacco: Never Used   Substance Use Topics    Alcohol use: No        Review of Systems:   · Constitutional: No Fever or Weight Loss   · Eyes: No Decreased Vision  · ENT: No Headaches, Hearing Loss or Vertigo  · Cardiovascular: as per note above   · Respiratory: No cough or wheezing and as per note above. · Gastrointestinal: No abdominal pain, appetite loss, blood in stools, constipation, diarrhea or heartburn  · Genitourinary: No dysuria, trouble voiding, or hematuria  · Musculoskeletal:  None  · Integumentary: No rash or pruritis  · Neurological: No TIA or stroke symptoms  · Psychiatric: No anxiety or depression  · Endocrine: No malaise, fatigue or temperature intolerance  · Hematologic/Lymphatic: No bleeding problems, blood clots or swollen lymph nodes  · Allergic/Immunologic: No nasal congestion or hives    Objective:      Physical Exam:  /84   Pulse 80   Ht 5' 8\" (1.727 m)   Wt 297 lb 3.2 oz (134.8 kg)   BMI 45.19 kg/m²   Wt Readings from Last 3 Encounters:   08/03/21 297 lb 3.2 oz (134.8 kg)   05/03/21 292 lb 12.8 oz (132.8 kg)   04/14/21 296 lb (134.3 kg)     Body mass index is 45.19 kg/m².   Vitals:    08/03/21 1454   BP: 130/84   Pulse: 80      General Appearance:  No distress, conversant  Constitutional:  Well developed, Well nourished, No acute distress, Non-toxic structure and function.   Ejection fraction is visually estimated at 50-60%.  Grade I diastolic dysfunction.   No significant valvular disease noted.   No evidence of pericardial effusion.     Doppler 3/26/18          No evidence of significant occlusive arterial disease.    Bilateral VERONICA's show normal arterial flow.         Echo 5/24/21  Summary   Left ventricular systolic function is mildly depressed with an ejection   fraction of 45%-50%. Mild septal wall asymmetrical left ventricular hypertrophy. Left Ventricular chamber size is dilated. No significant valvular disease noted. No evidence of pericardial effusion. Stress test  5/14/21    Summary    Supervising physician Dr. Kim Pimentel .    Normal EF 45 % with normal ventricular contractility. No infarct or ischemia    noted.    Normal stress myocardial perfusion.    This is a normal study.               All labs, medications and tests reviewed by myself including data and history from outside source , patient and available family . Assessment & Plan:      1. Essential hypertension    2. Precordial pain    3. Type 2 diabetes mellitus without complication, without long-term current use of insulin (HCC)    4. Claudication ([address]    5. Dyslipidemia       . Precordial pain  Her last stress test in February 2018 and May 2021  showed no ischemia. She thinks her chest pressure / pain occurs with stress full situation, unfortunately high blood pressure will also cause chest pain, The pain does not  radiate , She notices more shortness of breath when she is not taking her water pill    Cardiomyopathy  EF of 45- 50 % Hypertensive heart disease? Ef is low normal but she appears euvolemic       Essential hypertension  \"bpis better on  lotrel to 10/40 mg daily She was  advised to bring her meds to clarify what she is taking but she did she did not bring them  .  She does not check her bp at home     Claudication [facility])  Arterial Doppler in March 2018 did not show any abnormality, it may be neuropathy     Dyslipidemia :  Patricia Darell had lab work recently,  Lipid profile was reviewed with patient. Ideally she should be on statins due to diabetes. Will start small dose pravastatin     Counseled extensively and medication compliance urged. We discussed that for the  prevention of ASCVD our  goal is aggressive risk modification. Patient is encouraged to exercise even a brisk walk for 30 minutes  at least 3 to 4 times a week   Various goals were discussed and questions answered. Continue current medications. Appropriate prescriptions are addressed and refills ordered. Questions answered and patient verbalizes understanding. Call for any problems, questions, or concerns. Continue all other medications of all above medical condition listed as is. Return in about 6 months (around 2/3/2022).

## 2021-09-21 ENCOUNTER — APPOINTMENT (OUTPATIENT)
Dept: GENERAL RADIOLOGY | Age: 61
End: 2021-09-21
Payer: MEDICAID

## 2021-09-21 ENCOUNTER — HOSPITAL ENCOUNTER (EMERGENCY)
Age: 61
Discharge: HOME | End: 2021-09-22
Attending: EMERGENCY MEDICINE
Payer: MEDICAID
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
## 2021-09-21 DIAGNOSIS — S40.012A CONTUSION OF LEFT SHOULDER, INITIAL ENCOUNTER: ICD-10-CM

## 2021-09-21 DIAGNOSIS — M25.462 EFFUSION OF LEFT KNEE: ICD-10-CM

## 2021-09-21 DIAGNOSIS — S80.12XA CONTUSION OF LEFT LOWER EXTREMITY, INITIAL ENCOUNTER: Primary | ICD-10-CM

## 2021-09-21 PROCEDURE — 99284 EMERGENCY DEPT VISIT MOD MDM: CPT

## 2021-09-21 RX ORDER — METHOCARBAMOL 500 MG/1
750 TABLET, FILM COATED ORAL ONCE
Status: COMPLETED | OUTPATIENT
Start: 2021-09-21 | End: 2021-09-22

## 2021-09-21 RX ORDER — HYDROCODONE BITARTRATE AND ACETAMINOPHEN 5; 325 MG/1; MG/1
1 TABLET ORAL ONCE
Status: COMPLETED | OUTPATIENT
Start: 2021-09-21 | End: 2021-09-22

## 2021-09-21 ASSESSMENT — PAIN SCALES - GENERAL: PAINLEVEL_OUTOF10: 8

## 2021-09-21 ASSESSMENT — PAIN DESCRIPTION - PAIN TYPE: TYPE: ACUTE PAIN

## 2021-09-21 ASSESSMENT — PAIN DESCRIPTION - ORIENTATION: ORIENTATION: LEFT

## 2021-09-21 ASSESSMENT — PAIN DESCRIPTION - LOCATION: LOCATION: LEG

## 2021-09-22 ENCOUNTER — APPOINTMENT (OUTPATIENT)
Dept: GENERAL RADIOLOGY | Age: 61
End: 2021-09-22
Payer: MEDICAID

## 2021-09-22 VITALS
OXYGEN SATURATION: 98 % | SYSTOLIC BLOOD PRESSURE: 157 MMHG | HEART RATE: 69 BPM | DIASTOLIC BLOOD PRESSURE: 91 MMHG | TEMPERATURE: 97.7 F | RESPIRATION RATE: 18 BRPM | HEIGHT: 68 IN | BODY MASS INDEX: 45.19 KG/M2

## 2021-09-22 PROCEDURE — 73030 X-RAY EXAM OF SHOULDER: CPT

## 2021-09-22 PROCEDURE — 6370000000 HC RX 637 (ALT 250 FOR IP): Performed by: EMERGENCY MEDICINE

## 2021-09-22 PROCEDURE — 73630 X-RAY EXAM OF FOOT: CPT

## 2021-09-22 PROCEDURE — 73590 X-RAY EXAM OF LOWER LEG: CPT

## 2021-09-22 PROCEDURE — 73562 X-RAY EXAM OF KNEE 3: CPT

## 2021-09-22 RX ORDER — NAPROXEN 500 MG/1
500 TABLET ORAL 2 TIMES DAILY
Qty: 30 TABLET | Refills: 0 | Status: ON HOLD | OUTPATIENT
Start: 2021-09-22 | End: 2022-01-12 | Stop reason: HOSPADM

## 2021-09-22 RX ORDER — LIDOCAINE 50 MG/G
1 PATCH TOPICAL DAILY
Qty: 30 PATCH | Refills: 0 | Status: SHIPPED | OUTPATIENT
Start: 2021-09-22 | End: 2021-10-22

## 2021-09-22 RX ORDER — HYDROCODONE BITARTRATE AND ACETAMINOPHEN 5; 325 MG/1; MG/1
1 TABLET ORAL EVERY 6 HOURS PRN
Qty: 7 TABLET | Refills: 0 | Status: SHIPPED | OUTPATIENT
Start: 2021-09-22 | End: 2021-09-25

## 2021-09-22 RX ORDER — METHOCARBAMOL 500 MG/1
500 TABLET, FILM COATED ORAL 4 TIMES DAILY PRN
Qty: 20 TABLET | Refills: 0 | Status: SHIPPED | OUTPATIENT
Start: 2021-09-22 | End: 2021-10-02

## 2021-09-22 RX ADMIN — HYDROCODONE BITARTRATE AND ACETAMINOPHEN 1 TABLET: 5; 325 TABLET ORAL at 00:34

## 2021-09-22 RX ADMIN — METHOCARBAMOL 750 MG: 500 TABLET ORAL at 00:34

## 2021-09-22 ASSESSMENT — PAIN SCALES - GENERAL: PAINLEVEL_OUTOF10: 8

## 2021-09-22 NOTE — ED PROVIDER NOTES
CHIEF COMPLAINT  Chief Complaint   Patient presents with    Fall     left leg and foot injury       HPI  Ryder Cuenca is a 64 y.o. female with history of bipolar disorder, COPD, diabetes, hypertension who presents after she lost her balance around 2 PM and fell to the left side, landing on her left lower extremity. She has pain at the great toe, pain at the lateral knee, pain at the tib-fib and pain at the left shoulder after a fall. She denies any trauma to the head or neck. She denies any loss of consciousness. She denies any chest pain, abdominal pain. She denies any current dizziness, lightheadedness, shortness of breath, other concerns. Her main concern is severe pain, greatest in the foot and knee and persistent until time evaluation here, no medications or interventions prior to arrival.      REVIEW OF SYSTEMS  Review of Systems   History obtained from chart review, the patient and family at chair side  General ROS: negative for - fever  Ophthalmic ROS: negative for - double vision  ENT ROS: negative for - headaches  Hematological and Lymphatic ROS: negative for - bleeding problems  Endocrine ROS: negative for - unexpected weight changes  Respiratory ROS: no cough, shortness of breath, or wheezing  Cardiovascular ROS: no chest pain or dyspnea on exertion  Gastrointestinal ROS: no abdominal pain, change in bowel habits, or black or bloody stools  Genito-Urinary ROS: no dysuria, trouble voiding, or hematuria  Musculoskeletal ROS: negative for -numbness or tingling  Neurological ROS: no TIA or stroke symptoms      PAST MEDICAL HISTORY  Past Medical History:   Diagnosis Date    Arthritis     Asthma     [facility] [address]     COPD (chronic obstructive pulmonary disease) [facility] [address]     Depression     Diabetes mellitus ([facility] [address]     Edema     Glaucoma     History of Doppler ultrasound 03/26/2018    Arterial-bilateral VERONICA's show normal arterial flow. No signif occlusive arterial disease.     History of nuclear stress test 02/22/2018    cardiolite-EF45%,normal    Hx of Doppler echocardiogram 02/22/2018    EF50-60%,no valvular disease    Hyperlipidemia     Hypertension     IBS (irritable bowel syndrome)        FAMILY HISTORY  Family History   Problem Relation Age of Onset    Cancer Mother     Diabetes Mother     Heart Disease Mother     Cancer Father     High Blood Pressure Father     Cancer Other     Cancer Other     Diabetes Sister     Heart Disease Sister     High Blood Pressure Brother     Depression Sister        SOCIAL HISTORY  Social History     Socioeconomic History    Marital status: Legally      Spouse name: None    Number of children: None    Years of education: None    Highest education level: None   Occupational History    None   Tobacco Use    Smoking status: Former Smoker    Smokeless tobacco: Never Used   Vaping Use    Vaping Use: Never used   Substance and Sexual Activity    Alcohol use: No    Drug use: No    Sexual activity: None   Other Topics Concern    None   Social History Narrative    None     Social Determinants of Health     Financial Resource Strain:     Difficulty of Paying Living Expenses:    Food Insecurity:     Worried About Running Out of Food in the Last Year:     Ran Out of Food in the Last Year:    Transportation Needs:     Lack of Transportation (Medical):      Lack of Transportation (Non-Medical):    Physical Activity:     Days of Exercise per Week:     Minutes of Exercise per Session:    Stress:     Feeling of Stress :    Social Connections:     Frequency of Communication with Friends and Family:     Frequency of Social Gatherings with Friends and Family:     Attends Anab[employer] Services:     Active Member of Clubs or Organizations:     Attends Club or Organization Meetings:     Marital Status:    Intimate Partner Violence:     Fear of Current or Ex-Partner:     Emotionally Abused:     Physically Abused:     Sexually Abused: SURGICAL HISTORY  Past Surgical History:   Procedure Laterality Date    TUBAL LIGATION         CURRENT MEDICATIONS  No current facility-administered medications on file prior to encounter. Current Outpatient Medications on File Prior to Encounter   Medication Sig Dispense Refill    amLODIPine-benazepril (LOTREL) 10-40 MG per capsule Take 1 capsule by mouth daily 90 capsule 3    acetaminophen (APAP EXTRA STRENGTH) 500 MG tablet Take 1 tablet by mouth every 6 hours as needed for Pain 20 tablet 0    bacitracin-neomycin-polymyxin b-hydrocortisone 1 % ointment Apply topically 2 times daily.  1 Tube 0    ondansetron (ZOFRAN ODT) 4 MG disintegrating tablet Take 1 tablet by mouth every 8 hours as needed for Nausea 15 tablet 0    RA ALCOHOL SWABS 70 % PADS use as directed three times a day  0    ibuprofen (ADVIL;MOTRIN) 600 MG tablet Take 1 tablet by mouth every 6 hours as needed for Pain 30 tablet 0    Pseudoephedrine-DM-GG 30- MG CAPS Take 1 tablet by mouth 2 times daily as needed (cough or congestion) 10 capsule 0    pravastatin (PRAVACHOL) 20 MG tablet take 1 tablet by mouth once daily 30 tablet 5    linagliptin (TRADJENTA) 5 MG tablet Take 1 tablet by mouth daily 30 tablet 3    metFORMIN (GLUCOPHAGE) 850 MG tablet Take 1 tablet by mouth 2 times daily (with meals) 60 tablet 3    insulin lispro (HUMALOG) 100 UNIT/ML injection vial Inject 30 Units into the skin 3 times daily (before meals) 1 vial 3    guaiFENesin (MUCINEX) 600 MG extended release tablet Take 1 tablet by mouth 2 times daily 20 tablet 0    mometasone-formoterol (DULERA) 100-5 MCG/ACT inhaler Inhale 2 puffs into the lungs 2 times daily 13 g 3    albuterol (PROVENTIL) (2.5 MG/3ML) 0.083% nebulizer solution   0    divalproex (DEPAKOTE) 250 MG DR tablet take 1 tablet by mouth twice a day  0    dicyclomine (BENTYL) 20 MG tablet Take 20 mg by mouth every 6 hours      VENTOLIN  (90 Base) MCG/ACT inhaler   0    LUMIGAN 0.01 % SOLN ophthalmic drops Place 1 drop into both eyes daily   1    theophylline (THEODUR) 300 MG extended release tablet 1 tablet daily  0    pantoprazole (PROTONIX) 40 MG tablet Take 40 mg by mouth daily      furosemide (LASIX) 20 MG tablet take 1/2 tablet every few weeks  0    LYRICA 75 MG capsule take 1 capsule by mouth twice a day  0    ibuprofen (ADVIL;MOTRIN) 600 MG tablet Take 1 tablet by mouth every 8 hours as needed for Pain 30 tablet 0    Misc. Devices (CANE) MISC 1 Device by Does not apply route daily as needed. 1 each 0    DULoxetine (CYMBALTA) 60 MG capsule Take 60 mg by mouth daily.  insulin glargine (LANTUS) 100 UNIT/ML injection Inject 50 Units into the skin nightly            ALLERGIES  Allergies   Allergen Reactions    Codeine     Adhesive Tape Itching    Toradol [Ketorolac Tromethamine] Other (See Comments)     Headache for 5-6 days       PHYSICAL EXAM  VITAL SIGNS: BP (!) 157/91   Pulse 69   Temp 97.7 °F (36.5 °C) (Oral)   Resp 18   Ht 5' 8\" (1.727 m)   SpO2 98%   BMI 45.19 kg/m²   Constitutional: Well developed, Well nourished, resting in chair, active  HENT: Normocephalic, Atraumatic, Bilateral external ears normal, mask in place per recommendations  Eyes: PERRL, EOMI, Conjunctiva normal, No discharge. Neck: Normal range of motion, Supple, No stridor. Cardiovascular: Normal heart rate, Normal rhythm, No murmurs, No rubs, No gallops. Thorax & Lungs: Normal breath sounds, No respiratory distress, No wheezing, No chest tenderness. Abdomen: Bowel sounds normal, Soft, No tenderness, no guarding, no rebound, No masses, No pulsatile masses. Skin: Warm, Dry, No erythema, No rash. Extremities: Intact distal pulses, No edema, No tenderness, No cyanosis, No clubbing. Left lower extremity: Tenderness of the lateral knee joint space, tenderness at the proximal tib-fib region, tenderness at the left great toe. Normal DP pulse. Normal warmth of the foot.   No tenderness on palpation of the left hip. Musculoskeletal: Good gross range of motion in all major joints. No major deformities noted. Some TTP at the left [facility], no tenderness at the olecranon or radial head. Full range of motion of the arm when examining the leg without any apparent discomfort  Neurologic: Alert & oriented x 3, Normal gross motor function, Normal gross sensory function, No focal deficits noted. Psychiatric: Affect normal        RADIOLOGY/PROCEDURES/LABS  Last Imaging results   XR FOOT LEFT (MIN 3 VIEWS)   Final Result   No acute osseous abnormality. XR KNEE LEFT (3 VIEWS)   Final Result   No acute osseous abnormality. Small suprapatellar joint effusion. Moderate tricompartmental arthritic changes. XR TIBIA FIBULA LEFT (2 VIEWS)   Final Result   No acute osseous abnormality. XR SHOULDER LEFT (MIN 2 VIEWS)   Final Result   No acute osseous abnormality. Imaging reviewed by myself    Medications   HYDROcodone-acetaminophen (NORCO) 5-325 MG per tablet 1 tablet (1 tablet Oral Given 9/22/21 0034)   methocarbamol (ROBAXIN) tablet 750 mg (750 mg Oral Given 9/22/21 0034)       COURSE & MEDICAL DECISION MAKING  Pertinent Labs & Imaging studies reviewed. (See chart for details)    70-year-old female presents with contusion to left lower extremity and left shoulder after a fall from standing. Here she is neurologically nonfocal, low conical suspicion for CVA. She did not strike her head or lose consciousness. She denies any tenderness of the head, neck, chest or abdomen. Imaging of the locations of discomfort was obtained and does not suggest fracture or dislocation. She is neurovascularly intact. She was treated symptomatically in the department with Norco and Robaxin. She will be continued on symptom control as an outpatient. Ace wrap applied. Strict return precautions put into place, encouraged to follow primary care for recheck.         FINAL IMPRESSION  Problem List Items Addressed This Visit     None      Visit Diagnoses     Contusion of left lower extremity, initial encounter    -  Primary    Relevant Medications    HYDROcodone-acetaminophen (NORCO) 5-325 MG per tablet    Effusion of left knee        Contusion of left shoulder, initial encounter        Fall from standing, initial encounter          1.    2.   3.    Patient gave me permission to discuss medical history, care, and plan with those present in the room.   Electronically signed by: Rosio Weller MD, 9/22/2021  MD Rosio Mustafa MD  09/22/21 0219

## 2022-01-06 ENCOUNTER — HOSPITAL ENCOUNTER (INPATIENT)
Age: 62
LOS: 6 days | Discharge: HOME HEALTH | DRG: 720 | End: 2022-01-12
Attending: INTERNAL MEDICINE | Admitting: INTERNAL MEDICINE
Payer: MEDICAID

## 2022-01-06 ENCOUNTER — APPOINTMENT (OUTPATIENT)
Dept: CT IMAGING | Age: 62
DRG: 720 | End: 2022-01-06
Payer: MEDICAID

## 2022-01-06 ENCOUNTER — APPOINTMENT (OUTPATIENT)
Dept: GENERAL RADIOLOGY | Age: 62
DRG: 720 | End: 2022-01-06
Payer: MEDICAID

## 2022-01-06 DIAGNOSIS — U07.1 ACUTE RESPIRATORY FAILURE DUE TO COVID-19 (HCC): Primary | ICD-10-CM

## 2022-01-06 DIAGNOSIS — J18.9 MULTIFOCAL PNEUMONIA: ICD-10-CM

## 2022-01-06 DIAGNOSIS — J96.00 ACUTE RESPIRATORY FAILURE DUE TO COVID-19 (HCC): Primary | ICD-10-CM

## 2022-01-06 PROBLEM — J12.82 PNEUMONIA DUE TO COVID-19 VIRUS: Status: ACTIVE | Noted: 2022-01-06

## 2022-01-06 LAB
ALBUMIN SERPL-MCNC: 2.9 GM/DL (ref 3.4–5)
ALP BLD-CCNC: 84 IU/L (ref 40–129)
ALT SERPL-CCNC: 13 U/L (ref 10–40)
ANION GAP SERPL CALCULATED.3IONS-SCNC: 13 MMOL/L (ref 4–16)
AST SERPL-CCNC: 11 IU/L (ref 15–37)
BASE EXCESS MIXED: 4.4 (ref 0–2.3)
BASOPHILS ABSOLUTE: 0 K/CU MM
BASOPHILS RELATIVE PERCENT: 0.1 % (ref 0–1)
BILIRUB SERPL-MCNC: 0.4 MG/DL (ref 0–1)
BUN BLDV-MCNC: 7 MG/DL (ref 6–23)
CALCIUM SERPL-MCNC: 9 MG/DL (ref 8.3–10.6)
CHLORIDE BLD-SCNC: 96 MMOL/L (ref 99–110)
CO2: 23 MMOL/L (ref 21–32)
COMMENT: ABNORMAL
CREAT SERPL-MCNC: 0.6 MG/DL (ref 0.6–1.1)
DIFFERENTIAL TYPE: ABNORMAL
EOSINOPHILS ABSOLUTE: 0 K/CU MM
EOSINOPHILS RELATIVE PERCENT: 0.1 % (ref 0–3)
GFR AFRICAN AMERICAN: >60 ML/MIN/1.73M2
GFR NON-AFRICAN AMERICAN: >60 ML/MIN/1.73M2
GLUCOSE BLD-MCNC: 204 MG/DL (ref 70–99)
GLUCOSE BLD-MCNC: 239 MG/DL (ref 70–99)
HCO3 VENOUS: 29.2 MMOL/L (ref 19–25)
HCT VFR BLD CALC: 40.5 % (ref 37–47)
HEMOGLOBIN: 13 GM/DL (ref 12.5–16)
IMMATURE NEUTROPHIL %: 0.4 % (ref 0–0.43)
LACTATE: 1 MMOL/L (ref 0.4–2)
LYMPHOCYTES ABSOLUTE: 1.2 K/CU MM
LYMPHOCYTES RELATIVE PERCENT: 15.3 % (ref 24–44)
MCH RBC QN AUTO: 27 PG (ref 27–31)
MCHC RBC AUTO-ENTMCNC: 32.1 % (ref 32–36)
MCV RBC AUTO: 84 FL (ref 78–100)
MONOCYTES ABSOLUTE: 0.3 K/CU MM
MONOCYTES RELATIVE PERCENT: 4.3 % (ref 0–4)
NUCLEATED RBC %: 0 %
O2 SAT, VEN: 85.3 % (ref 50–70)
PCO2, VEN: 43 MMHG (ref 38–52)
PDW BLD-RTO: 13.6 % (ref 11.7–14.9)
PH VENOUS: 7.44 (ref 7.32–7.42)
PLATELET # BLD: 191 K/CU MM (ref 140–440)
PMV BLD AUTO: 10.2 FL (ref 7.5–11.1)
PO2, VEN: 58 MMHG (ref 28–48)
POTASSIUM SERPL-SCNC: 3.4 MMOL/L (ref 3.5–5.1)
PRO-BNP: 13.16 PG/ML
RBC # BLD: 4.82 M/CU MM (ref 4.2–5.4)
SARS-COV-2, NAAT: DETECTED
SEGMENTED NEUTROPHILS ABSOLUTE COUNT: 6.2 K/CU MM
SEGMENTED NEUTROPHILS RELATIVE PERCENT: 79.8 % (ref 36–66)
SODIUM BLD-SCNC: 132 MMOL/L (ref 135–145)
SOURCE: ABNORMAL
TOTAL IMMATURE NEUTOROPHIL: 0.03 K/CU MM
TOTAL NUCLEATED RBC: 0 K/CU MM
TOTAL PROTEIN: 7.2 GM/DL (ref 6.4–8.2)
TROPONIN T: <0.01 NG/ML
WBC # BLD: 7.8 K/CU MM (ref 4–10.5)

## 2022-01-06 PROCEDURE — 96374 THER/PROPH/DIAG INJ IV PUSH: CPT

## 2022-01-06 PROCEDURE — 84484 ASSAY OF TROPONIN QUANT: CPT

## 2022-01-06 PROCEDURE — 6370000000 HC RX 637 (ALT 250 FOR IP): Performed by: INTERNAL MEDICINE

## 2022-01-06 PROCEDURE — 2580000003 HC RX 258: Performed by: INTERNAL MEDICINE

## 2022-01-06 PROCEDURE — 99285 EMERGENCY DEPT VISIT HI MDM: CPT

## 2022-01-06 PROCEDURE — 6360000004 HC RX CONTRAST MEDICATION: Performed by: PHYSICIAN ASSISTANT

## 2022-01-06 PROCEDURE — 74177 CT ABD & PELVIS W/CONTRAST: CPT

## 2022-01-06 PROCEDURE — 71045 X-RAY EXAM CHEST 1 VIEW: CPT

## 2022-01-06 PROCEDURE — 2580000003 HC RX 258: Performed by: PHYSICIAN ASSISTANT

## 2022-01-06 PROCEDURE — 85025 COMPLETE CBC W/AUTO DIFF WBC: CPT

## 2022-01-06 PROCEDURE — 71275 CT ANGIOGRAPHY CHEST: CPT

## 2022-01-06 PROCEDURE — 2060000000 HC ICU INTERMEDIATE R&B

## 2022-01-06 PROCEDURE — 6370000000 HC RX 637 (ALT 250 FOR IP): Performed by: PHYSICIAN ASSISTANT

## 2022-01-06 PROCEDURE — 96375 TX/PRO/DX INJ NEW DRUG ADDON: CPT

## 2022-01-06 PROCEDURE — 82962 GLUCOSE BLOOD TEST: CPT

## 2022-01-06 PROCEDURE — 93005 ELECTROCARDIOGRAM TRACING: CPT | Performed by: PHYSICIAN ASSISTANT

## 2022-01-06 PROCEDURE — 87635 SARS-COV-2 COVID-19 AMP PRB: CPT

## 2022-01-06 PROCEDURE — 6360000002 HC RX W HCPCS: Performed by: PHYSICIAN ASSISTANT

## 2022-01-06 PROCEDURE — 82805 BLOOD GASES W/O2 SATURATION: CPT

## 2022-01-06 PROCEDURE — 6360000002 HC RX W HCPCS: Performed by: INTERNAL MEDICINE

## 2022-01-06 PROCEDURE — 96361 HYDRATE IV INFUSION ADD-ON: CPT

## 2022-01-06 PROCEDURE — 80053 COMPREHEN METABOLIC PANEL: CPT

## 2022-01-06 PROCEDURE — 83605 ASSAY OF LACTIC ACID: CPT

## 2022-01-06 PROCEDURE — 83880 ASSAY OF NATRIURETIC PEPTIDE: CPT

## 2022-01-06 RX ORDER — VITAMIN B COMPLEX
2000 TABLET ORAL DAILY
Status: DISCONTINUED | OUTPATIENT
Start: 2022-01-07 | End: 2022-01-12 | Stop reason: HOSPADM

## 2022-01-06 RX ORDER — NAPROXEN 250 MG/1
500 TABLET ORAL 2 TIMES DAILY WITH MEALS
Status: DISCONTINUED | OUTPATIENT
Start: 2022-01-06 | End: 2022-01-12 | Stop reason: HOSPADM

## 2022-01-06 RX ORDER — AMLODIPINE BESYLATE 5 MG/1
10 TABLET ORAL DAILY
Status: DISCONTINUED | OUTPATIENT
Start: 2022-01-07 | End: 2022-01-12 | Stop reason: HOSPADM

## 2022-01-06 RX ORDER — ACETAMINOPHEN 500 MG
500 TABLET ORAL EVERY 6 HOURS PRN
Status: DISCONTINUED | OUTPATIENT
Start: 2022-01-06 | End: 2022-01-12 | Stop reason: HOSPADM

## 2022-01-06 RX ORDER — DEXAMETHASONE SODIUM PHOSPHATE 10 MG/ML
8 INJECTION, SOLUTION INTRAMUSCULAR; INTRAVENOUS ONCE
Status: COMPLETED | OUTPATIENT
Start: 2022-01-06 | End: 2022-01-06

## 2022-01-06 RX ORDER — IBUPROFEN 600 MG/1
600 TABLET ORAL EVERY 6 HOURS PRN
Status: DISCONTINUED | OUTPATIENT
Start: 2022-01-06 | End: 2022-01-12 | Stop reason: HOSPADM

## 2022-01-06 RX ORDER — FAMOTIDINE 20 MG/1
20 TABLET, FILM COATED ORAL 2 TIMES DAILY
Status: DISCONTINUED | OUTPATIENT
Start: 2022-01-06 | End: 2022-01-12 | Stop reason: HOSPADM

## 2022-01-06 RX ORDER — SODIUM CHLORIDE 0.9 % (FLUSH) 0.9 %
5-40 SYRINGE (ML) INJECTION PRN
Status: DISCONTINUED | OUTPATIENT
Start: 2022-01-06 | End: 2022-01-12 | Stop reason: HOSPADM

## 2022-01-06 RX ORDER — SODIUM CHLORIDE 9 MG/ML
25 INJECTION, SOLUTION INTRAVENOUS PRN
Status: DISCONTINUED | OUTPATIENT
Start: 2022-01-06 | End: 2022-01-12 | Stop reason: HOSPADM

## 2022-01-06 RX ORDER — INSULIN GLARGINE 100 [IU]/ML
50 INJECTION, SOLUTION SUBCUTANEOUS NIGHTLY
Status: DISCONTINUED | OUTPATIENT
Start: 2022-01-06 | End: 2022-01-07

## 2022-01-06 RX ORDER — FUROSEMIDE 20 MG/1
20 TABLET ORAL DAILY
Status: DISCONTINUED | OUTPATIENT
Start: 2022-01-07 | End: 2022-01-12 | Stop reason: HOSPADM

## 2022-01-06 RX ORDER — GUAIFENESIN 600 MG/1
600 TABLET, EXTENDED RELEASE ORAL 2 TIMES DAILY
Status: DISCONTINUED | OUTPATIENT
Start: 2022-01-06 | End: 2022-01-12 | Stop reason: HOSPADM

## 2022-01-06 RX ORDER — PREGABALIN 75 MG/1
75 CAPSULE ORAL DAILY
Status: DISCONTINUED | OUTPATIENT
Start: 2022-01-07 | End: 2022-01-07

## 2022-01-06 RX ORDER — ONDANSETRON 2 MG/ML
4 INJECTION INTRAMUSCULAR; INTRAVENOUS EVERY 30 MIN PRN
Status: DISCONTINUED | OUTPATIENT
Start: 2022-01-06 | End: 2022-01-06 | Stop reason: SDUPTHER

## 2022-01-06 RX ORDER — DIVALPROEX SODIUM 250 MG/1
250 TABLET, DELAYED RELEASE ORAL EVERY 8 HOURS SCHEDULED
Status: DISCONTINUED | OUTPATIENT
Start: 2022-01-06 | End: 2022-01-07

## 2022-01-06 RX ORDER — ALBUTEROL SULFATE 90 UG/1
2 AEROSOL, METERED RESPIRATORY (INHALATION) EVERY 4 HOURS PRN
Status: DISCONTINUED | OUTPATIENT
Start: 2022-01-06 | End: 2022-01-12 | Stop reason: HOSPADM

## 2022-01-06 RX ORDER — DICYCLOMINE HCL 20 MG
20 TABLET ORAL EVERY 6 HOURS
Status: DISCONTINUED | OUTPATIENT
Start: 2022-01-06 | End: 2022-01-12 | Stop reason: HOSPADM

## 2022-01-06 RX ORDER — DEXAMETHASONE 4 MG/1
6 TABLET ORAL DAILY
Status: DISCONTINUED | OUTPATIENT
Start: 2022-01-06 | End: 2022-01-12 | Stop reason: HOSPADM

## 2022-01-06 RX ORDER — ACETAMINOPHEN 650 MG/1
650 SUPPOSITORY RECTAL EVERY 6 HOURS PRN
Status: DISCONTINUED | OUTPATIENT
Start: 2022-01-06 | End: 2022-01-12 | Stop reason: HOSPADM

## 2022-01-06 RX ORDER — IBUPROFEN 400 MG/1
400 TABLET ORAL ONCE
Status: COMPLETED | OUTPATIENT
Start: 2022-01-06 | End: 2022-01-06

## 2022-01-06 RX ORDER — ALBUTEROL SULFATE 2.5 MG/3ML
2.5 SOLUTION RESPIRATORY (INHALATION) EVERY 4 HOURS PRN
Status: DISCONTINUED | OUTPATIENT
Start: 2022-01-06 | End: 2022-01-12 | Stop reason: HOSPADM

## 2022-01-06 RX ORDER — PRAVASTATIN SODIUM 10 MG
20 TABLET ORAL NIGHTLY
Status: DISCONTINUED | OUTPATIENT
Start: 2022-01-06 | End: 2022-01-12 | Stop reason: HOSPADM

## 2022-01-06 RX ORDER — ONDANSETRON 2 MG/ML
4 INJECTION INTRAMUSCULAR; INTRAVENOUS EVERY 6 HOURS PRN
Status: DISCONTINUED | OUTPATIENT
Start: 2022-01-06 | End: 2022-01-12 | Stop reason: HOSPADM

## 2022-01-06 RX ORDER — AMLODIPINE BESYLATE AND BENAZEPRIL HYDROCHLORIDE 10; 40 MG/1; MG/1
1 CAPSULE ORAL DAILY
Status: DISCONTINUED | OUTPATIENT
Start: 2022-01-07 | End: 2022-01-06 | Stop reason: CLARIF

## 2022-01-06 RX ORDER — ONDANSETRON 4 MG/1
4 TABLET, ORALLY DISINTEGRATING ORAL EVERY 8 HOURS PRN
Status: DISCONTINUED | OUTPATIENT
Start: 2022-01-06 | End: 2022-01-06 | Stop reason: SDUPTHER

## 2022-01-06 RX ORDER — ONDANSETRON 4 MG/1
4 TABLET, ORALLY DISINTEGRATING ORAL EVERY 8 HOURS PRN
Status: DISCONTINUED | OUTPATIENT
Start: 2022-01-06 | End: 2022-01-12 | Stop reason: HOSPADM

## 2022-01-06 RX ORDER — POLYETHYLENE GLYCOL 3350 17 G/17G
17 POWDER, FOR SOLUTION ORAL DAILY PRN
Status: DISCONTINUED | OUTPATIENT
Start: 2022-01-06 | End: 2022-01-12 | Stop reason: HOSPADM

## 2022-01-06 RX ORDER — ACETAMINOPHEN 325 MG/1
650 TABLET ORAL EVERY 6 HOURS PRN
Status: DISCONTINUED | OUTPATIENT
Start: 2022-01-06 | End: 2022-01-12 | Stop reason: HOSPADM

## 2022-01-06 RX ORDER — 0.9 % SODIUM CHLORIDE 0.9 %
1000 INTRAVENOUS SOLUTION INTRAVENOUS ONCE
Status: COMPLETED | OUTPATIENT
Start: 2022-01-06 | End: 2022-01-06

## 2022-01-06 RX ORDER — SODIUM CHLORIDE 0.9 % (FLUSH) 0.9 %
5-40 SYRINGE (ML) INJECTION EVERY 12 HOURS SCHEDULED
Status: DISCONTINUED | OUTPATIENT
Start: 2022-01-06 | End: 2022-01-12 | Stop reason: HOSPADM

## 2022-01-06 RX ORDER — ACETAMINOPHEN 325 MG/1
650 TABLET ORAL ONCE
Status: COMPLETED | OUTPATIENT
Start: 2022-01-06 | End: 2022-01-06

## 2022-01-06 RX ORDER — PANTOPRAZOLE SODIUM 40 MG/1
40 TABLET, DELAYED RELEASE ORAL DAILY
Status: DISCONTINUED | OUTPATIENT
Start: 2022-01-07 | End: 2022-01-12 | Stop reason: HOSPADM

## 2022-01-06 RX ORDER — DULOXETIN HYDROCHLORIDE 30 MG/1
60 CAPSULE, DELAYED RELEASE ORAL DAILY
Status: DISCONTINUED | OUTPATIENT
Start: 2022-01-07 | End: 2022-01-12 | Stop reason: HOSPADM

## 2022-01-06 RX ORDER — BUDESONIDE AND FORMOTEROL FUMARATE DIHYDRATE 160; 4.5 UG/1; UG/1
2 AEROSOL RESPIRATORY (INHALATION) 2 TIMES DAILY
Status: DISCONTINUED | OUTPATIENT
Start: 2022-01-06 | End: 2022-01-12 | Stop reason: HOSPADM

## 2022-01-06 RX ORDER — LISINOPRIL 20 MG/1
40 TABLET ORAL DAILY
Status: DISCONTINUED | OUTPATIENT
Start: 2022-01-07 | End: 2022-01-12 | Stop reason: HOSPADM

## 2022-01-06 RX ADMIN — SODIUM CHLORIDE 1000 ML: 9 INJECTION, SOLUTION INTRAVENOUS at 16:07

## 2022-01-06 RX ADMIN — PRAVASTATIN SODIUM 20 MG: 10 TABLET ORAL at 22:00

## 2022-01-06 RX ADMIN — ACETAMINOPHEN 650 MG: 325 TABLET ORAL at 16:05

## 2022-01-06 RX ADMIN — INSULIN GLARGINE 50 UNITS: 100 INJECTION, SOLUTION SUBCUTANEOUS at 23:30

## 2022-01-06 RX ADMIN — DEXAMETHASONE SODIUM PHOSPHATE 8 MG: 10 INJECTION INTRAMUSCULAR; INTRAVENOUS at 15:56

## 2022-01-06 RX ADMIN — FAMOTIDINE 20 MG: 20 TABLET, FILM COATED ORAL at 22:00

## 2022-01-06 RX ADMIN — DEXAMETHASONE 6 MG: 4 TABLET ORAL at 22:00

## 2022-01-06 RX ADMIN — ENOXAPARIN SODIUM 30 MG: 100 INJECTION SUBCUTANEOUS at 22:00

## 2022-01-06 RX ADMIN — SODIUM CHLORIDE, PRESERVATIVE FREE 10 ML: 5 INJECTION INTRAVENOUS at 22:00

## 2022-01-06 RX ADMIN — IOPAMIDOL 75 ML: 755 INJECTION, SOLUTION INTRAVENOUS at 17:22

## 2022-01-06 RX ADMIN — ONDANSETRON 4 MG: 2 INJECTION INTRAMUSCULAR; INTRAVENOUS at 15:57

## 2022-01-06 RX ADMIN — DIVALPROEX SODIUM 250 MG: 250 TABLET, DELAYED RELEASE ORAL at 22:00

## 2022-01-06 RX ADMIN — DICYCLOMINE HYDROCHLORIDE 20 MG: 20 TABLET ORAL at 22:00

## 2022-01-06 RX ADMIN — IBUPROFEN 400 MG: 400 TABLET, FILM COATED ORAL at 16:06

## 2022-01-06 RX ADMIN — GUAIFENESIN 600 MG: 600 TABLET, EXTENDED RELEASE ORAL at 22:00

## 2022-01-06 ASSESSMENT — PAIN SCALES - GENERAL
PAINLEVEL_OUTOF10: 8
PAINLEVEL_OUTOF10: 0
PAINLEVEL_OUTOF10: 8
PAINLEVEL_OUTOF10: 0

## 2022-01-06 ASSESSMENT — PAIN DESCRIPTION - LOCATION: LOCATION: GENERALIZED

## 2022-01-06 ASSESSMENT — PAIN DESCRIPTION - DESCRIPTORS: DESCRIPTORS: ACHING

## 2022-01-06 NOTE — ED PROVIDER NOTES
12 lead EKG per my interpretation:  Normal Sinus Rhythm 89  Axis is   Normal  QTc is  440  There is no specific T wave changes appreciated. There is no specific ST wave changes appreciated.     Prior EKG to compare with was not available        German Cranker, DO  01/06/22 2745

## 2022-01-06 NOTE — ED PROVIDER NOTES
Take 1 tablet by mouth every 8 hours as needed for Nausea 15 tablet 0    RA ALCOHOL SWABS 70 % PADS use as directed three times a day  0    ibuprofen (ADVIL;MOTRIN) 600 MG tablet Take 1 tablet by mouth every 6 hours as needed for Pain 30 tablet 0    Pseudoephedrine-DM-GG 30- MG CAPS Take 1 tablet by mouth 2 times daily as needed (cough or congestion) 10 capsule 0    pravastatin (PRAVACHOL) 20 MG tablet take 1 tablet by mouth once daily 30 tablet 5    linagliptin (TRADJENTA) 5 MG tablet Take 1 tablet by mouth daily 30 tablet 3    metFORMIN (GLUCOPHAGE) 850 MG tablet Take 1 tablet by mouth 2 times daily (with meals) 60 tablet 3    insulin lispro (HUMALOG) 100 UNIT/ML injection vial Inject 30 Units into the skin 3 times daily (before meals) 1 vial 3    guaiFENesin (MUCINEX) 600 MG extended release tablet Take 1 tablet by mouth 2 times daily 20 tablet 0    mometasone-formoterol (DULERA) 100-5 MCG/ACT inhaler Inhale 2 puffs into the lungs 2 times daily 13 g 3    albuterol (PROVENTIL) (2.5 MG/3ML) 0.083% nebulizer solution   0    divalproex (DEPAKOTE) 250 MG DR tablet take 1 tablet by mouth twice a day  0    dicyclomine (BENTYL) 20 MG tablet Take 20 mg by mouth every 6 hours      VENTOLIN  (90 Base) MCG/ACT inhaler   0    LUMIGAN 0.01 % SOLN ophthalmic drops Place 1 drop into both eyes daily   1    theophylline (THEODUR) 300 MG extended release tablet 1 tablet daily  0    pantoprazole (PROTONIX) 40 MG tablet Take 40 mg by mouth daily      furosemide (LASIX) 20 MG tablet take 1/2 tablet every few weeks  0    LYRICA 75 MG capsule take 1 capsule by mouth twice a day  0    ibuprofen (ADVIL;MOTRIN) 600 MG tablet Take 1 tablet by mouth every 8 hours as needed for Pain 30 tablet 0    Misc. Devices (CANE) MISC 1 Device by Does not apply route daily as needed. 1 each 0    DULoxetine (CYMBALTA) 60 MG capsule Take 60 mg by mouth daily.       insulin glargine (LANTUS) 100 UNIT/ML injection Inject 50 Units into the skin nightly        Allergies   Allergen Reactions    Codeine     Adhesive Tape Itching    Toradol [Ketorolac Tromethamine] Other (See Comments)     Headache for 5-6 days       Nursing Notes Reviewed  PHYSICAL EXAM    VITAL SIGNS: /87   Pulse 95   Temp 98.7 °F (37.1 °C) (Oral)   Resp 20   Ht 5' 8\" (1.727 m)   Wt 280 lb (127 kg)   SpO2 95%   BMI 42.57 kg/m²    Constitutional:  Well developed, elevated BMI, appears generally deconditioned, nontoxic but fatigued. Head:  Normocephalic, Atraumatic  Eyes:  EOMI. Sclera clear. Conjunctiva normal, No discharge. Neck/Lymphatics: Supple, no JVD, No lymphadenopathy  Cardiovascular:  RRR, Normal S1 & S2     Peripheral Vascular: Distal pulses 2+, Capillary refill <2seconds  Respiratory:  Respirations moderately labored, 92% on 5 L. Diminished air exchange bilaterally. No rales. No retractions or accessory muscle use. Abdomen: Bowel sounds normal in all quadrants, Soft, Non tender/Nondistended, No palpable abdominal masses. Musculoskeletal: BUE/BLE symmetrical without atrophy or deformities  Integument:  Warm, Dry, Intact, Skin turgor and texture normal  Neurologic:  Alert & oriented x3 , No focal deficits noted. Cranial nerves II through XII grossly intact. No slurred speech. No facial droop. Globally weak.   Psychiatric:  Affect appropriate      I have reviewed and interpreted all of the currently available lab results from this visit (if applicable):  Results for orders placed or performed during the hospital encounter of 01/06/22   COVID-19, Rapid    Specimen: Nasopharyngeal   Result Value Ref Range    Source THROAT     SARS-CoV-2, NAAT DETECTED (A) NOT DETECTED   CBC Auto Differential   Result Value Ref Range    WBC 7.8 4.0 - 10.5 K/CU MM    RBC 4.82 4.2 - 5.4 M/CU MM    Hemoglobin 13.0 12.5 - 16.0 GM/DL    Hematocrit 40.5 37 - 47 %    MCV 84.0 78 - 100 FL    MCH 27.0 27 - 31 PG    MCHC 32.1 32.0 - 36.0 %    RDW 13.6 11.7 - 14.9 % Platelets 331 589 - 053 K/CU MM    MPV 10.2 7.5 - 11.1 FL    Differential Type AUTOMATED DIFFERENTIAL     Segs Relative 79.8 (H) 36 - 66 %    Lymphocytes % 15.3 (L) 24 - 44 %    Monocytes % 4.3 (H) 0 - 4 %    Eosinophils % 0.1 0 - 3 %    Basophils % 0.1 0 - 1 %    Segs Absolute 6.2 K/CU MM    Lymphocytes Absolute 1.2 K/CU MM    Monocytes Absolute 0.3 K/CU MM    Eosinophils Absolute 0.0 K/CU MM    Basophils Absolute 0.0 K/CU MM    Nucleated RBC % 0.0 %    Total Nucleated RBC 0.0 K/CU MM    Total Immature Neutrophil 0.03 K/CU MM    Immature Neutrophil % 0.4 0 - 0.43 %   Comprehensive Metabolic Panel   Result Value Ref Range    Sodium 132 (L) 135 - 145 MMOL/L    Potassium 3.4 (L) 3.5 - 5.1 MMOL/L    Chloride 96 (L) 99 - 110 mMol/L    CO2 23 21 - 32 MMOL/L    BUN 7 6 - 23 MG/DL    CREATININE 0.6 0.6 - 1.1 MG/DL    Glucose 204 (H) 70 - 99 MG/DL    Calcium 9.0 8.3 - 10.6 MG/DL    Albumin 2.9 (L) 3.4 - 5.0 GM/DL    Total Protein 7.2 6.4 - 8.2 GM/DL    Total Bilirubin 0.4 0.0 - 1.0 MG/DL    ALT 13 10 - 40 U/L    AST 11 (L) 15 - 37 IU/L    Alkaline Phosphatase 84 40 - 129 IU/L    GFR Non-African American >60 >60 mL/min/1.73m2    GFR African American >60 >60 mL/min/1.73m2    Anion Gap 13 4 - 16   Troponin   Result Value Ref Range    Troponin T <0.010 <0.01 NG/ML   Brain Natriuretic Peptide   Result Value Ref Range    Pro-BNP 13.16 <300 PG/ML   Blood Gas, Venous   Result Value Ref Range    pH, [place] 7.44 (H) 7.32 - 7.42    pCO2, [place] 43 38 - 52 mmHG    pO2, [place] 58 (H) 28 - 48 mmHG    Base Exc, Mixed 4.4 (H) 0 - 2.3    HCO3, Venous 29.2 (H) 19 - 25 MMOL/L    O2 Sat, [place] 85.3 (H) 50 - 70 %    Comment VBG    Lactic Acid, Plasma   Result Value Ref Range    Lactate 1.0 0.4 - 2.0 mMOL/L   POCT Glucose   Result Value Ref Range    POC Glucose 239 (H) 70 - 99 MG/DL   EKG 12 Lead   Result Value Ref Range    Ventricular Rate 90 BPM    Atrial Rate 90 BPM    P-R Interval 128 ms    QRS Duration 84 ms    Q-T Interval 356 ms    QTc Calculation (Bazett) 435 ms    P Axis -29 degrees    R Axis -22 degrees    T Axis 13 degrees    Diagnosis       Normal sinus rhythm  Nonspecific ST and T wave abnormality  Abnormal ECG  When compared with ECG of 14-APR-2021 14:57,  Criteria for Septal infarct are no longer present          Radiographs (if obtained):  [] The following radiograph was interpreted by myself in the absence of a radiologist:   [] Radiologist's Report Reviewed:  CT ABDOMEN PELVIS W IV CONTRAST Additional Contrast? None   Final Result   Limited exam as portions of the left abdomen were excluded from the field of   view. No gross acute abnormalities are identified. CTA PULMONARY W CONTRAST   Final Result   Findings most consistent with multifocal pneumonia. RECOMMENDATIONS:   Unavailable         XR CHEST PORTABLE   Final Result   Ill-defined bilateral peripheral opacities, most concerning for COVID   pneumonia. Small bilateral pleural effusions.                   CT ABDOMEN PELVIS W IV CONTRAST Additional Contrast? None (Final result)  Result time 01/06/22 17:31:30  Final result by Mehrdad Huff MD (01/06/22 17:31:30)                Impression:    Limited exam as portions of the left abdomen were excluded from the field of   view.  No gross acute abnormalities are identified. Narrative:    EXAMINATION:   CT OF THE ABDOMEN AND PELVIS WITH CONTRAST 1/6/2022 4:50 pm     TECHNIQUE:   CT of the abdomen and pelvis was performed with the administration of   intravenous contrast. Multiplanar reformatted images are provided for review. Dose modulation, iterative reconstruction, and/or weight based adjustment of   the mA/kV was utilized to reduce the radiation dose to as low as reasonably   achievable. COMPARISON:   None.      HISTORY:   ORDERING SYSTEM PROVIDED HISTORY: generalized abdominal pain, nv   TECHNOLOGIST PROVIDED HISTORY:   Reason for exam:->generalized abdominal pain, nv   Additional Contrast?->None Decision Support Exception - unselect if not a suspected or confirmed   emergency medical condition->Emergency Medical Condition (MA)   Reason for Exam: generalized abdominal pain, nv     FINDINGS:   Lower Chest:The lung bases are clear     Organs: The liver, spleen, adrenal glands, kidneys, and pancreas demonstrate   no acute abnormality. GI/Bowel: The visualized portions of the bowel demonstrate no acute   abnormality. Is noted the portions of the bowel were excluded from the exam.     Peritoneum/Retroperitoneum: Unremarkable     Pelvis: Unremarkable     Bones: No suspicious osseous lesions are identified                       CTA PULMONARY W CONTRAST (Final result)  Result time 01/06/22 17:29:40  Procedure changed from [address]  Final result by Emily Davison MD (01/06/22 17:29:40)                Impression:    Findings most consistent with multifocal pneumonia. RECOMMENDATIONS:   Unavailable             Narrative:    EXAMINATION:   CTA OF THE CHEST 1/6/2022 4:50 pm     TECHNIQUE:   CTA of the chest was performed after the administration of intravenous   contrast.  Multiplanar reformatted images are provided for review.  MIP   images are provided for review. Dose modulation, iterative reconstruction,   and/or weight based adjustment of the mA/kV was utilized to reduce the   radiation dose to as low as reasonably achievable. COMPARISON:   None. HISTORY:   ORDERING SYSTEM PROVIDED HISTORY: SOB, hypoxia, covid+   TECHNOLOGIST PROVIDED HISTORY:   Reason for exam:->SOB, hypoxia, covid+     FINDINGS:   Pulmonary Arteries: Evaluation is significantly limited by motion artifact. No large central pulmonary emboli are identified.  Evaluation is   nondiagnostic beyond the lobar level. Mediastinum: No evidence of mediastinal lymphadenopathy.  The heart and   pericardium demonstrate no acute abnormality.  There is no acute abnormality   of the thoracic aorta. Lungs/pleura:  There is extensive multifocal bilateral airspace disease. Upper Abdomen: Limited images of the upper abdomen are unremarkable. Soft Tissues/Bones: No acute bone or soft tissue abnormality.                       XR CHEST PORTABLE (Final result)  Result time 01/06/22 14:55:22  Final result by Carlitos Thomas MD (01/06/22 14:55:22)                Impression:    Ill-defined bilateral peripheral opacities, most concerning for COVID   pneumonia. Small bilateral pleural effusions. Narrative:    EXAMINATION:   ONE XRAY VIEW OF THE CHEST     1/6/2022 2:32 pm     COMPARISON:   04/14/2021     HISTORY:   ORDERING SYSTEM PROVIDED HISTORY: chest pain   TECHNOLOGIST PROVIDED HISTORY:   Reason for exam:->chest pain   Reason for Exam: chest pain   Additional signs and symptoms: COVID - 19   Relevant Medical/Surgical History: na     FINDINGS:   Small bilateral pleural effusions.  Ill-defined bilateral opacities, most   prominently in the periphery of the lungs.  No pneumothorax.  Heart size is   within normal limits.                       EKG Interpretation  Please see ED physician's note - Dr. Cheryl Blair - for EKG interpretation        Chart review shows recent radiographs:  No results found. ED COURSE & MEDICAL DECISION MAKING       Vital signs and nursing notes reviewed during ED course. I have independently evaluated this patient . Supervising physician - Dr. Cheryl Blair - was present in ED and available for consultation throughout entirety of patient's care. All pertinent Lab data and radiographic results reviewed with patient at bedside. The patient and/or the family were informed of the results of any tests/labs/imaging, the treatment plan, and time was allotted to answer questions. Clinical Impression:  1. Acute respiratory failure due to COVID-19 ([address]    2. Multifocal pneumonia        Patient presents with decreased appetite/anorexia, nausea vomiting as well as shortness of breath. On exam, ill-appearing 70-year-old female, per ED nursing notes, was 85% on 3 L nasal cannula and has improved to 92% on 5 L. This is a new oxygen requirement. Mild labored breathing with diminished air exchange across lower lung bases. No rales respiratory stridor. Abdominal exam is nonsurgical.  Patient appears globally weak and fatigued, no focal neuro deficits. No pitting edema or asymmetry in the lower legs. Patient is placed on to telemetry and continuous pulse ox monitoring. Started on IV fluids, antiemetics and Decadron. CBC shows normal white count hemoglobin. CMP with mild hypokalemia of 3.4 with normal renal function, LFTs, troponin and BNP. Normal CO2 and anion gap. Lactic within normal range. Venous blood gas shows a pH of 7.44 with a normal PCO2. Rapid Covid is positive/detectable. CT abdomen pelvis with contrast shows no acute abdominal pelvic process however limited exams as portions of the left abdomen were excluded from field-of-view. CTA pulmonary with contrast as well as chest x-ray shows findings concerning for multifocal pneumonia. At this time, given patient's new oxygen requirement, do feel admission is warranted as presentation is concerning for acute respiratory failure secondary to COVID-19. I did speak with house supervisor as patient is over 10 days since onset of symptoms however as she is symptomatic requiring oxygen, she will need to be admitted initially to Covid floor. Plan discussed with patient who verbalizes understanding and agreement. I did consult with Dr. Genaro Perez - hospitalist - and discussed patient's history, ED presentation/course including any pertinent laboratory findings and imaging study findings. He/she agrees to hospital admission. Patient is admitted to the hospital in stable condition.          CRITICAL CARE NOTE:  There was a high probability of clinically significant life-threatening deterioration of the patient's condition requiring my urgent intervention due to acute respiratory failure. Telemetry, continuous pulse ox monitoring, high flow nasal cannula, IV steroids, antiemetics, fluids, chart review, CT imaging, consultation with specialty services was performed to address this. Total critical care time is at least  31 minutes. This includes vital sign monitoring, pulse oximetry monitoring, telemetry monitoring, clinical response to the IV medications, reviewing the nursing notes, consultation time, dictation/documentation time, and interpretation of the lab work. This time excludes time spent performing procedures and separately billable procedures and family discussion time. Diagnosis and plan discussed in detail with patient who understands and agrees. In consideration of current COVID19 pandemic, with effort to minimize unnecessary provider exposure, this patient was seen at bedside by me independently. However, in compliance with current hospital HE/ED protocol, prior to admission I did discuss this patient case with emergency department physician, Dr. Foreign Anders, who did agree with ED workup/evaluation and plan for admission however but ED attending physician did not independently evaluate the patient. Disposition referral (if applicable):  No follow-up provider specified.     Disposition medications (if applicable):  New Prescriptions    No medications on file         (Please note that portions of this note may have been completed with a voice recognition program. Efforts were made to edit the dictations but occasionally words are mis-transcribed.)          Nba Barkley PA-C  01/06/22 [id number]

## 2022-01-06 NOTE — ED NOTES
The patient presents to the emergency department alert and oriented with a complaint of shortness of breath on excertion for several days. The patient tested positive for COVID after ksenia. The patient placed on the monitor with vital signs taken. Assessment as follows; Skin is pink, warm and dry. Lung sounds are clear. Oxygen saturation was 85% on 3 LPM and was 92% on 3 LPM in the ambulance. Initial O2 saturation was 88 % on 5 LPM in room and then increased to 92%. Felicitas Newman RN  01/06/22 [id number]

## 2022-01-06 NOTE — ED NOTES
Bed: ED-01  Expected date:   Expected time:   Means of arrival:   Comments:  ems     Brian Road, RN  01/06/22 2623

## 2022-01-07 LAB
ALBUMIN SERPL-MCNC: 3.5 GM/DL (ref 3.4–5)
ALP BLD-CCNC: 96 IU/L (ref 40–129)
ALT SERPL-CCNC: 14 U/L (ref 10–40)
ANION GAP SERPL CALCULATED.3IONS-SCNC: 15 MMOL/L (ref 4–16)
AST SERPL-CCNC: 12 IU/L (ref 15–37)
BANDED NEUTROPHILS ABSOLUTE COUNT: 0.62 K/CU MM
BANDED NEUTROPHILS RELATIVE PERCENT: 11 % (ref 5–11)
BILIRUB SERPL-MCNC: 0.3 MG/DL (ref 0–1)
BUN BLDV-MCNC: 11 MG/DL (ref 6–23)
CALCIUM SERPL-MCNC: 9 MG/DL (ref 8.3–10.6)
CHLORIDE BLD-SCNC: 99 MMOL/L (ref 99–110)
CO2: 22 MMOL/L (ref 21–32)
CREAT SERPL-MCNC: 0.6 MG/DL (ref 0.6–1.1)
D DIMER: 555 NG/ML(DDU)
DIFFERENTIAL TYPE: ABNORMAL
EKG ATRIAL RATE: 90 BPM
EKG DIAGNOSIS: NORMAL
EKG P AXIS: -29 DEGREES
EKG P-R INTERVAL: 128 MS
EKG Q-T INTERVAL: 356 MS
EKG QRS DURATION: 84 MS
EKG QTC CALCULATION (BAZETT): 435 MS
EKG R AXIS: -22 DEGREES
EKG T AXIS: 13 DEGREES
EKG VENTRICULAR RATE: 90 BPM
FERRITIN: 334 NG/ML (ref 15–150)
GFR AFRICAN AMERICAN: >60 ML/MIN/1.73M2
GFR NON-AFRICAN AMERICAN: >60 ML/MIN/1.73M2
GLUCOSE BLD-MCNC: 172 MG/DL (ref 70–99)
GLUCOSE BLD-MCNC: 260 MG/DL (ref 70–99)
GLUCOSE BLD-MCNC: 275 MG/DL (ref 70–99)
GLUCOSE BLD-MCNC: 283 MG/DL (ref 70–99)
GLUCOSE BLD-MCNC: 310 MG/DL (ref 70–99)
HCT VFR BLD CALC: 41.2 % (ref 37–47)
HEMOGLOBIN: 13.2 GM/DL (ref 12.5–16)
HIGH SENSITIVE C-REACTIVE PROTEIN: 209.6 MG/L
LACTATE DEHYDROGENASE: 337 IU/L (ref 120–246)
LYMPHOCYTES ABSOLUTE: 0.6 K/CU MM
LYMPHOCYTES RELATIVE PERCENT: 11 % (ref 24–44)
MCH RBC QN AUTO: 27.1 PG (ref 27–31)
MCHC RBC AUTO-ENTMCNC: 32 % (ref 32–36)
MCV RBC AUTO: 84.6 FL (ref 78–100)
METAMYELOCYTES ABSOLUTE COUNT: 0.11 K/CU MM
METAMYELOCYTES PERCENT: 2 %
MONOCYTES ABSOLUTE: 0.3 K/CU MM
MONOCYTES RELATIVE PERCENT: 5 % (ref 0–4)
PDW BLD-RTO: 13.6 % (ref 11.7–14.9)
PLATELET # BLD: 228 K/CU MM (ref 140–440)
PLT MORPHOLOGY: ABNORMAL
PMV BLD AUTO: 10.2 FL (ref 7.5–11.1)
POLYCHROMASIA: ABNORMAL
POTASSIUM SERPL-SCNC: 4.2 MMOL/L (ref 3.5–5.1)
PROCALCITONIN: 0.27
RBC # BLD: 4.87 M/CU MM (ref 4.2–5.4)
SEGMENTED NEUTROPHILS ABSOLUTE COUNT: 4 K/CU MM
SEGMENTED NEUTROPHILS RELATIVE PERCENT: 71 % (ref 36–66)
SODIUM BLD-SCNC: 136 MMOL/L (ref 135–145)
TOTAL PROTEIN: 7.1 GM/DL (ref 6.4–8.2)
WBC # BLD: 5.6 K/CU MM (ref 4–10.5)
WBC # BLD: ABNORMAL 10*3/UL

## 2022-01-07 PROCEDURE — 94640 AIRWAY INHALATION TREATMENT: CPT

## 2022-01-07 PROCEDURE — 6370000000 HC RX 637 (ALT 250 FOR IP): Performed by: INTERNAL MEDICINE

## 2022-01-07 PROCEDURE — 2580000003 HC RX 258: Performed by: INTERNAL MEDICINE

## 2022-01-07 PROCEDURE — 82962 GLUCOSE BLOOD TEST: CPT

## 2022-01-07 PROCEDURE — 6360000002 HC RX W HCPCS: Performed by: INTERNAL MEDICINE

## 2022-01-07 PROCEDURE — 94761 N-INVAS EAR/PLS OXIMETRY MLT: CPT

## 2022-01-07 PROCEDURE — XW0DXM6 INTRODUCTION OF BARICITINIB INTO MOUTH AND PHARYNX, EXTERNAL APPROACH, NEW TECHNOLOGY GROUP 6: ICD-10-PCS | Performed by: INTERNAL MEDICINE

## 2022-01-07 PROCEDURE — 80053 COMPREHEN METABOLIC PANEL: CPT

## 2022-01-07 PROCEDURE — 99233 SBSQ HOSP IP/OBS HIGH 50: CPT | Performed by: INTERNAL MEDICINE

## 2022-01-07 PROCEDURE — 84145 PROCALCITONIN (PCT): CPT

## 2022-01-07 PROCEDURE — 82728 ASSAY OF FERRITIN: CPT

## 2022-01-07 PROCEDURE — 85379 FIBRIN DEGRADATION QUANT: CPT

## 2022-01-07 PROCEDURE — 86141 C-REACTIVE PROTEIN HS: CPT

## 2022-01-07 PROCEDURE — 85027 COMPLETE CBC AUTOMATED: CPT

## 2022-01-07 PROCEDURE — 2700000000 HC OXYGEN THERAPY PER DAY

## 2022-01-07 PROCEDURE — 85007 BL SMEAR W/DIFF WBC COUNT: CPT

## 2022-01-07 PROCEDURE — 93010 ELECTROCARDIOGRAM REPORT: CPT | Performed by: INTERNAL MEDICINE

## 2022-01-07 PROCEDURE — 36415 COLL VENOUS BLD VENIPUNCTURE: CPT

## 2022-01-07 PROCEDURE — 83615 LACTATE (LD) (LDH) ENZYME: CPT

## 2022-01-07 PROCEDURE — 2060000000 HC ICU INTERMEDIATE R&B

## 2022-01-07 PROCEDURE — 1200000000 HC SEMI PRIVATE

## 2022-01-07 RX ORDER — ACETAMINOPHEN 160 MG
1 TABLET,DISINTEGRATING ORAL
COMMUNITY

## 2022-01-07 RX ORDER — DEXTROSE MONOHYDRATE 25 G/50ML
12.5 INJECTION, SOLUTION INTRAVENOUS PRN
Status: DISCONTINUED | OUTPATIENT
Start: 2022-01-07 | End: 2022-01-08 | Stop reason: SDUPTHER

## 2022-01-07 RX ORDER — INSULIN GLARGINE 100 [IU]/ML
70 INJECTION, SOLUTION SUBCUTANEOUS NIGHTLY
Status: DISCONTINUED | OUTPATIENT
Start: 2022-01-07 | End: 2022-01-12 | Stop reason: HOSPADM

## 2022-01-07 RX ORDER — NICOTINE POLACRILEX 4 MG
15 LOZENGE BUCCAL PRN
Status: DISCONTINUED | OUTPATIENT
Start: 2022-01-07 | End: 2022-01-08 | Stop reason: SDUPTHER

## 2022-01-07 RX ORDER — MULTIVIT WITH MINERALS/LUTEIN
1000 TABLET ORAL DAILY
COMMUNITY

## 2022-01-07 RX ORDER — SIMVASTATIN 20 MG
20 TABLET ORAL NIGHTLY
Status: ON HOLD | COMMUNITY
End: 2022-01-12 | Stop reason: HOSPADM

## 2022-01-07 RX ORDER — DEXTROSE MONOHYDRATE 50 MG/ML
100 INJECTION, SOLUTION INTRAVENOUS PRN
Status: DISCONTINUED | OUTPATIENT
Start: 2022-01-07 | End: 2022-01-08 | Stop reason: SDUPTHER

## 2022-01-07 RX ADMIN — AZITHROMYCIN MONOHYDRATE 500 MG: 500 INJECTION, POWDER, LYOPHILIZED, FOR SOLUTION INTRAVENOUS at 18:33

## 2022-01-07 RX ADMIN — LISINOPRIL 40 MG: 20 TABLET ORAL at 09:10

## 2022-01-07 RX ADMIN — DIVALPROEX SODIUM 250 MG: 250 TABLET, DELAYED RELEASE ORAL at 05:39

## 2022-01-07 RX ADMIN — DICYCLOMINE HYDROCHLORIDE 20 MG: 20 TABLET ORAL at 17:23

## 2022-01-07 RX ADMIN — FUROSEMIDE 20 MG: 20 TABLET ORAL at 09:11

## 2022-01-07 RX ADMIN — DICYCLOMINE HYDROCHLORIDE 20 MG: 20 TABLET ORAL at 20:41

## 2022-01-07 RX ADMIN — DIVALPROEX SODIUM 250 MG: 250 TABLET, DELAYED RELEASE ORAL at 13:11

## 2022-01-07 RX ADMIN — DICYCLOMINE HYDROCHLORIDE 20 MG: 20 TABLET ORAL at 09:10

## 2022-01-07 RX ADMIN — INSULIN GLARGINE 20 UNITS: 100 INJECTION, SOLUTION SUBCUTANEOUS at 20:45

## 2022-01-07 RX ADMIN — GUAIFENESIN 600 MG: 600 TABLET, EXTENDED RELEASE ORAL at 20:41

## 2022-01-07 RX ADMIN — SODIUM CHLORIDE, PRESERVATIVE FREE 10 ML: 5 INJECTION INTRAVENOUS at 20:42

## 2022-01-07 RX ADMIN — ALBUTEROL SULFATE 2 PUFF: 90 AEROSOL, METERED RESPIRATORY (INHALATION) at 20:36

## 2022-01-07 RX ADMIN — DEXAMETHASONE 6 MG: 4 TABLET ORAL at 09:11

## 2022-01-07 RX ADMIN — CEFTRIAXONE 1000 MG: 1 INJECTION, POWDER, FOR SOLUTION INTRAMUSCULAR; INTRAVENOUS at 18:01

## 2022-01-07 RX ADMIN — BARICITINIB 4 MG: 2 TABLET, FILM COATED ORAL at 06:30

## 2022-01-07 RX ADMIN — FAMOTIDINE 20 MG: 20 TABLET, FILM COATED ORAL at 09:11

## 2022-01-07 RX ADMIN — PRAVASTATIN SODIUM 20 MG: 10 TABLET ORAL at 20:41

## 2022-01-07 RX ADMIN — THEOPHYLLINE ANHYDROUS 300 MG: 100 CAPSULE, EXTENDED RELEASE ORAL at 09:10

## 2022-01-07 RX ADMIN — FAMOTIDINE 20 MG: 20 TABLET, FILM COATED ORAL at 20:41

## 2022-01-07 RX ADMIN — ENOXAPARIN SODIUM 30 MG: 100 INJECTION SUBCUTANEOUS at 20:41

## 2022-01-07 RX ADMIN — DULOXETINE 60 MG: 30 CAPSULE, DELAYED RELEASE ORAL at 09:11

## 2022-01-07 RX ADMIN — PREGABALIN 75 MG: 75 CAPSULE ORAL at 09:10

## 2022-01-07 RX ADMIN — PANTOPRAZOLE SODIUM 40 MG: 40 TABLET, DELAYED RELEASE ORAL at 06:33

## 2022-01-07 RX ADMIN — SODIUM CHLORIDE, PRESERVATIVE FREE 10 ML: 5 INJECTION INTRAVENOUS at 09:18

## 2022-01-07 RX ADMIN — ENOXAPARIN SODIUM 30 MG: 100 INJECTION SUBCUTANEOUS at 09:11

## 2022-01-07 RX ADMIN — GUAIFENESIN 600 MG: 600 TABLET, EXTENDED RELEASE ORAL at 09:10

## 2022-01-07 RX ADMIN — BUDESONIDE AND FORMOTEROL FUMARATE DIHYDRATE 2 PUFF: 160; 4.5 AEROSOL RESPIRATORY (INHALATION) at 20:37

## 2022-01-07 RX ADMIN — Medication 500 MG: at 09:11

## 2022-01-07 RX ADMIN — AMLODIPINE BESYLATE 10 MG: 5 TABLET ORAL at 09:10

## 2022-01-07 RX ADMIN — DICYCLOMINE HYDROCHLORIDE 20 MG: 20 TABLET ORAL at 04:30

## 2022-01-07 ASSESSMENT — PAIN SCALES - GENERAL
PAINLEVEL_OUTOF10: 0
PAINLEVEL_OUTOF10: 0

## 2022-01-07 NOTE — PROGRESS NOTES
Pharmacy Consult for Baricitinib Initiation per Dr. Sofie Rendon per [facility] P&T Committee  **All criteria need to be met to receive   Baricitinib for COVID-19 patients**   Age    >5years old     Yes   Laboratory Results    Confirmed positive COVID-19     PLUS    ANY elevation in biomarkers   (CRP, D-dimer, LDH, ferritin)       Yes     Concomitant Therapy    Receiving systemic steroids for treatment of COVID 19     Yes   Oxygen Status        Requiring supplemental oxygen   (>1 L NC, HFNC, Heated Vapotherm, biPAP, mechanical ventilation)        Yes  6 L NC   Special Considerations    Pregnancy  Severe Hepatic Impairment  Chronic/Recurrent Infections   Hemoglobin <8         Clarify risk vs. benefit with provider if criteria met     Contraindications    1. Invasive active mycobacterial or fungal infection  2. Lymphocyte count <200  3. Neutrophil count, ANC <500   4. Significant immunosuppression    ?     None     Dosing Recommendations:    Baricitinib 4 mg PO daily x 14 days (or until hospital discharge)    Renal dose adjustment:  -eGFR > 60: no adjustment necessary       Thank you for the consult,  [facility]  1/07/21 @ 6178

## 2022-01-07 NOTE — PROGRESS NOTES
Patient admitted from ER to [address] [place] [address] Rd , denies pain at this time , pleasant and cooperative with care, a/o x 4 ,scheduled med's given with no difficulty, patient is on isolation for COVID , patient on ATB   With no adverse effect noted . Skilled nursing assessment /observation completed skin no issues noted ,on 02 5 liter NC, denies SOB . Can ambulate to bathroom, call light within reached , will continue to monitor .

## 2022-01-07 NOTE — H&P
History and Physical      Name:  Tonio Montiel /Age/Sex: 1960  (64 y.o. female)   MRN & CSN:  [id number] & [id number] Admission Date/Time: 2022  2:09 PM   Location:  ED01/ED- PCP: Edson Leblanc MD       Hospital Day: 1    Assessment and Plan:   Tonio Montiel is a 64 y.o.  female  who presents with <principal problem not specified>    COVID-19 pneumonia: Contact and droplet isolation. Consult pharmacy to assist with antiviral treatment. IV dexamethasone 6 mg daily. Incentive spirometer  Acute respiratory failure with hypoxia, patient is currently requiring 6 L of oxygen. Bronchodilator treatments. Oxygen to keep saturations greater than 90%  Hypokalemia, replace per protocol. Patient's potassium is currently 3.2. We will give 40 mEq of potassium chloride. Essential hypertension, resume patient's amlodipine and Benzapril, per home dose. COPD, bronchodilator treatment. Inhaled steroids per home dose. Diabetes type 2 sliding scale insulin coverage. Continue Lantus insulin at 50 units nightly. Also on lispro insulin 30 units 3 times daily per home dose. 1800 ADA diet. Depression and anxiety, on Cymbalta    Diet ADULT DIET; Regular   DVT Prophylaxis [x] Lovenox, []  Heparin, [] SCDs, [] Ambulation   GI Prophylaxis [x] PPI,  [] H2 Blocker,  [] Carafate,  [] Diet/Tube Feeds   Code Status Full Code   Disposition Patient requires continued admission due to care requiring hospital treatment. MDM [] Low, [x] Moderate,[]  High  Patient's risk as above due to hypoxia, with the potential to progress. History of Present Illness:     Chief Complaint:   Tonio Montiel is a 64 y.o.  female  who presents with a history of diabetes type 2, essential hypertension, depression and anxiety. ,  COPD, presents to the emergency room with progressive worsening shortness of breath. Patient states that symptoms started around [place]. She had an outpatient home test which was positive.   She continues to be positive even today. She is unvaccinated. She does note wear oxygen at home but required up to 10 L to keep saturations greater than 90% on presentation to the emergency room. She is now down to 6 L. Family also note the patient's blood sugars have been poorly controlled lately. She denies any chest pain, denies any fever, but has had a cough. She also complains of generalized weakness and fatigue. Ten point ROS reviewed negative, unless as noted above    Objective:   No intake or output data in the 24 hours ending 01/06/22 2153   Vitals:   Vitals:    01/06/22 1659   BP: 138/87   Pulse: 95   Resp: 20   Temp:    SpO2: 95%     Physical Exam:   GEN Awake female, sitting upright in bed in no apparent distress. Appears given age. EYES Pupils are equally round. No scleral erythema, discharge, or conjunctivitis. HENT Mucous membranes are moist. Oral pharynx without exudates, no evidence of thrush. NECK Supple, no apparent thyromegaly or masses. RESP C minutes air entry bilaterally. CARDIO/VASC S1/S2 auscultated. Regular rate without appreciable murmurs, rubs, or gallops. No JVD or carotid bruits. Peripheral pulses equal bilaterally and palpable. No peripheral edema. GI Abdomen is soft without significant tenderness, masses, or guarding. Bowel sounds are normoactive. Rectal exam deferred.  No costovertebral angle tenderness. Normal appearing external genitalia. Burgos catheter is not present. HEME/LYMPH No palpable cervical lymphadenopathy and no hepatosplenomegaly. No petechiae or ecchymoses. MSK No gross joint deformities. SKIN Normal coloration, warm, dry. NEURO Cranial nerves appear grossly intact, normal speech, no lateralizing weakness. PSYCH Awake, alert, oriented x 4. Affect appropriate.     Past Medical History:      Past Medical History:   Diagnosis Date    Arthritis     Asthma     Bipolar 1 disorder ([address]     COPD (chronic obstructive pulmonary disease) ([address]     Depression     [facility] mellitus [facility]     Edema     Glaucoma     History of Doppler ultrasound 03/26/2018    Arterial-bilateral VERONICA's show normal arterial flow. No signif occlusive arterial disease.  History of nuclear stress test 02/22/2018    cardiolite-EF45%,normal    Hx of Doppler echocardiogram 02/22/2018    EF50-60%,no valvular disease    Hyperlipidemia     Hypertension     IBS (irritable bowel syndrome)      PSHX:  has a past surgical history that includes Tubal ligation. Allergies: Allergies   Allergen Reactions    Codeine     Adhesive Tape Itching    Toradol [Ketorolac Tromethamine] Other (See Comments)     Headache for 5-6 days       FAM HX: family history includes Cancer in her father, mother, and other family members; Depression in her sister; Diabetes in her mother and sister; Heart Disease in her mother and sister; High Blood Pressure in her brother and father. Soc HX:   Social History     Socioeconomic History    Marital status: Legally      Spouse name: Not on file    Number of children: Not on file    Years of education: Not on file    Highest education level: Not on file   Occupational History    Not on file   Tobacco Use    Smoking status: Former Smoker    Smokeless tobacco: Never Used   Vaping Use    Vaping Use: Never used   Substance and Sexual Activity    Alcohol use: No    Drug use: No    Sexual activity: Not on file   Other Topics Concern    Not on file   Social History Narrative    Not on file     Social Determinants of Health     Financial Resource Strain:     Difficulty of Paying Living Expenses: Not on file   Food Insecurity:     Worried About [address] in the Last Year: Not on file    [address] in the Last Year: Not on file   Transportation Needs:     Lack of Transportation (Medical): Not on file    Lack of Transportation (Non-Medical):  Not on file   Physical Activity:     Days of Exercise per Week: Not on file    Minutes of Exercise per Session: Not on file   Stress:     Feeling of Stress : Not on file   Social Connections:     Frequency of Communication with Friends and Family: Not on file    Frequency of Social Gatherings with Friends and Family: Not on file    Attends Religion Services: Not on file    Active Member of Clubs or Organizations: Not on file    Attends Club or Organization Meetings: Not on file    Marital Status: Not on file   Intimate Partner Violence:     Fear of Current or Ex-Partner: Not on file    Emotionally Abused: Not on file    Physically Abused: Not on file    Sexually Abused: Not on file   Housing Stability:     Unable to Pay for Housing in the Last Year: Not on file    Number of Places Lived in the Last Year: Not on file    Unstable Housing in the Last Year: Not on file       Medications:   Medications:    insulin glargine  50 Units SubCUTAneous Nightly    insulin lispro  30 Units SubCUTAneous TID AC    guaiFENesin  600 mg Oral BID    [START ON 1/7/2022] DULoxetine  60 mg Oral Daily    [START ON 1/7/2022] pantoprazole  40 mg Oral Daily    [START ON 1/7/2022] pregabalin  75 mg Oral Daily    [START ON 1/7/2022] theophylline  300 mg Oral Daily    dicyclomine  20 mg Oral Q6H    divalproex  250 mg Oral 3 times per day    budesonide-formoterol  2 puff Inhalation BID    pravastatin  20 mg Oral Nightly    [START ON 1/7/2022] amLODIPine-benazepril  1 capsule Oral Daily    naproxen  500 mg Oral BID    [START ON 1/7/2022] furosemide  20 mg Oral Daily    sodium chloride flush  5-40 mL IntraVENous 2 times per day    enoxaparin  30 mg SubCUTAneous BID    famotidine  20 mg Oral BID    dexamethasone  6 mg Oral Daily    [START ON 1/7/2022] Vitamin D  2,000 Units Oral Daily      Infusions:    sodium chloride       PRN Meds: ondansetron, 4 mg, Q30 Min PRN  iopamidol, 75 mL, ONCE PRN  ibuprofen, 600 mg, Q6H PRN  albuterol sulfate HFA, 2 puff, Q4H PRN  albuterol, 2.5 mg, Q4H PRN  ondansetron, 4 mg, Q8H PRN  acetaminophen, 500 mg, Q6H PRN  sodium chloride flush, 5-40 mL, PRN  sodium chloride, 25 mL, PRN  ondansetron, 4 mg, Q8H PRN   Or  ondansetron, 4 mg, Q6H PRN  polyethylene glycol, 17 g, Daily PRN  acetaminophen, 650 mg, Q6H PRN   Or  acetaminophen, 650 mg, Q6H PRN          Electronically signed by Abdiel Pérez MD on 1/6/2022 at 9:53 PM

## 2022-01-07 NOTE — PROGRESS NOTES
[facility] Progress Note    Admitting Date and Time: 1/6/2022  2:09 PM  Admit Dx: Multifocal pneumonia [J18.9]  Pneumonia due to COVID-19 virus [U07.1, J12.82]  Acute respiratory failure due to COVID-19 ([facility] [U07.1, J96.00]    Subjective:  Patient is being followed for Multifocal pneumonia [J18.9]  Pneumonia due to COVID-19 virus [U07.1, J12.82]  Acute respiratory failure due to COVID-19 ([facility] [U07.1, J96.00]   Pt feels shortness of breath even with ambulating short distances. ROS: denies fever, chills, cp, sob, n/v, HA unless stated above.       baricitinib  4 mg Oral Daily    insulin glargine  50 Units SubCUTAneous Nightly    insulin lispro  30 Units SubCUTAneous TID WC    guaiFENesin  600 mg Oral BID    DULoxetine  60 mg Oral Daily    pantoprazole  40 mg Oral Daily    pregabalin  75 mg Oral Daily    theophylline  300 mg Oral Daily    dicyclomine  20 mg Oral Q6H    divalproex  250 mg Oral 3 times per day    budesonide-formoterol  2 puff Inhalation BID    pravastatin  20 mg Oral Nightly    naproxen  500 mg Oral BID WC    furosemide  20 mg Oral Daily    sodium chloride flush  5-40 mL IntraVENous 2 times per day    enoxaparin  30 mg SubCUTAneous BID    famotidine  20 mg Oral BID    dexamethasone  6 mg Oral Daily    Vitamin D  2,000 Units Oral Daily    amLODIPine  10 mg Oral Daily    And    lisinopril  40 mg Oral Daily     iopamidol, 75 mL, ONCE PRN  ibuprofen, 600 mg, Q6H PRN  albuterol sulfate HFA, 2 puff, Q4H PRN  albuterol, 2.5 mg, Q4H PRN  ondansetron, 4 mg, Q8H PRN  acetaminophen, 500 mg, Q6H PRN  sodium chloride flush, 5-40 mL, PRN  sodium chloride, 25 mL, PRN  ondansetron, 4 mg, Q6H PRN  polyethylene glycol, 17 g, Daily PRN  acetaminophen, 650 mg, Q6H PRN   Or  acetaminophen, 650 mg, Q6H PRN         Objective:    BP (!) 144/77   Pulse 88   Temp 97.4 °F (36.3 °C) (Oral)   Resp 16   Ht 5' 8\" (1.727 m)   Wt 280 lb (127 kg)   SpO2 (!) 89%   BMI 42.57 kg/m² General Appearance: alert and oriented to person, place and time and in no acute distress  Skin: warm and dry  Head: normocephalic and atraumatic  Eyes: pupils equal, round, and reactive to light, extraocular eye movements intact, conjunctivae normal  Neck: neck supple and non tender without mass   Pulmonary/Chest: clear to auscultation bilaterally- no wheezes, rales or rhonchi, normal air movement, no respiratory distress  Cardiovascular: normal rate, normal S1 and S2 and no carotid bruits  Abdomen: soft, non-tender, non-distended, normal bowel sounds, no masses or organomegaly  Extremities: no cyanosis, no clubbing and no edema  Neurologic: no cranial nerve deficit and speech normal        Recent Labs     01/06/22  1552 01/07/22  0853   * 136   K 3.4* 4.2   CL 96* 99   CO2 23 22   BUN 7 11   CREATININE 0.6 0.6   GLUCOSE 204* 310*   CALCIUM 9.0 9.0       Recent Labs     01/06/22  1552 01/07/22  0853   WBC 7.8 5.6   RBC 4.82 4.87   HGB 13.0 13.2   HCT 40.5 41.2   MCV 84.0 84.6   MCH 27.0 27.1   MCHC 32.1 32.0   RDW 13.6 13.6    228   MPV 10.2 10.2       Assessment:  1. COVID-19 pneumonia: Contact and droplet isolation. On baricitinib. IV dexamethasone 6 mg daily. Incentive spirometer. Procalcitonin pending. 2. Acute respiratory failure with hypoxia, patient is currently requiring 6 L of oxygen. Bronchodilator treatments. Oxygen to keep saturations greater than 90%  3. Hypokalemia, resolved   4. essential hypertension, resume patient's amlodipine and Benzapril, per home dose. 5. COPD, bronchodilator treatment. Inhaled steroids per home dose. Does not appear to be in acute exacerbation. 6. Diabetes type 2 sliding scale insulin coverage. Increase Lantus to home dose of 70 units at bedtime. Also on lispro insulin 30 units 3 times daily per home dose. 1800 ADA diet. 7. Depression and anxiety, on Cymbalta        NOTE: This report was transcribed using voice recognition software.  Every effort

## 2022-01-08 PROBLEM — J96.01 ACUTE HYPOXEMIC RESPIRATORY FAILURE (HCC): Status: ACTIVE | Noted: 2022-01-08

## 2022-01-08 LAB
ESTIMATED AVERAGE GLUCOSE: 220 MG/DL
GLUCOSE BLD-MCNC: 196 MG/DL (ref 70–99)
GLUCOSE BLD-MCNC: 269 MG/DL (ref 70–99)
GLUCOSE BLD-MCNC: 338 MG/DL (ref 70–99)
HBA1C MFR BLD: 9.3 % (ref 4.2–6.3)

## 2022-01-08 PROCEDURE — 6370000000 HC RX 637 (ALT 250 FOR IP): Performed by: INTERNAL MEDICINE

## 2022-01-08 PROCEDURE — 94761 N-INVAS EAR/PLS OXIMETRY MLT: CPT

## 2022-01-08 PROCEDURE — 2700000000 HC OXYGEN THERAPY PER DAY

## 2022-01-08 PROCEDURE — 1200000000 HC SEMI PRIVATE

## 2022-01-08 PROCEDURE — 94664 DEMO&/EVAL PT USE INHALER: CPT

## 2022-01-08 PROCEDURE — 2580000003 HC RX 258: Performed by: INTERNAL MEDICINE

## 2022-01-08 PROCEDURE — 6360000002 HC RX W HCPCS: Performed by: INTERNAL MEDICINE

## 2022-01-08 PROCEDURE — 94640 AIRWAY INHALATION TREATMENT: CPT

## 2022-01-08 PROCEDURE — 83036 HEMOGLOBIN GLYCOSYLATED A1C: CPT

## 2022-01-08 PROCEDURE — 82962 GLUCOSE BLOOD TEST: CPT

## 2022-01-08 RX ORDER — DEXTROSE MONOHYDRATE 50 MG/ML
100 INJECTION, SOLUTION INTRAVENOUS PRN
Status: DISCONTINUED | OUTPATIENT
Start: 2022-01-08 | End: 2022-01-12 | Stop reason: HOSPADM

## 2022-01-08 RX ORDER — LATANOPROST 50 UG/ML
1 SOLUTION/ DROPS OPHTHALMIC NIGHTLY
Status: DISCONTINUED | OUTPATIENT
Start: 2022-01-08 | End: 2022-01-12 | Stop reason: HOSPADM

## 2022-01-08 RX ORDER — NICOTINE POLACRILEX 4 MG
15 LOZENGE BUCCAL PRN
Status: DISCONTINUED | OUTPATIENT
Start: 2022-01-08 | End: 2022-01-12 | Stop reason: HOSPADM

## 2022-01-08 RX ORDER — DEXTROSE MONOHYDRATE 25 G/50ML
12.5 INJECTION, SOLUTION INTRAVENOUS PRN
Status: DISCONTINUED | OUTPATIENT
Start: 2022-01-08 | End: 2022-01-12 | Stop reason: HOSPADM

## 2022-01-08 RX ADMIN — LATANOPROST 1 DROP: 50 SOLUTION/ DROPS OPHTHALMIC at 22:14

## 2022-01-08 RX ADMIN — FUROSEMIDE 20 MG: 20 TABLET ORAL at 09:14

## 2022-01-08 RX ADMIN — ALBUTEROL SULFATE 2 PUFF: 90 AEROSOL, METERED RESPIRATORY (INHALATION) at 09:17

## 2022-01-08 RX ADMIN — THEOPHYLLINE ANHYDROUS 300 MG: 100 CAPSULE, EXTENDED RELEASE ORAL at 09:13

## 2022-01-08 RX ADMIN — DICYCLOMINE HYDROCHLORIDE 20 MG: 20 TABLET ORAL at 22:13

## 2022-01-08 RX ADMIN — PRAVASTATIN SODIUM 20 MG: 10 TABLET ORAL at 22:13

## 2022-01-08 RX ADMIN — Medication 2000 UNITS: at 09:14

## 2022-01-08 RX ADMIN — ENOXAPARIN SODIUM 30 MG: 100 INJECTION SUBCUTANEOUS at 09:14

## 2022-01-08 RX ADMIN — BUDESONIDE AND FORMOTEROL FUMARATE DIHYDRATE 2 PUFF: 160; 4.5 AEROSOL RESPIRATORY (INHALATION) at 09:18

## 2022-01-08 RX ADMIN — DICYCLOMINE HYDROCHLORIDE 20 MG: 20 TABLET ORAL at 17:03

## 2022-01-08 RX ADMIN — PANTOPRAZOLE SODIUM 40 MG: 40 TABLET, DELAYED RELEASE ORAL at 05:39

## 2022-01-08 RX ADMIN — AMLODIPINE BESYLATE 10 MG: 5 TABLET ORAL at 09:13

## 2022-01-08 RX ADMIN — Medication 500 MG: at 09:13

## 2022-01-08 RX ADMIN — FAMOTIDINE 20 MG: 20 TABLET, FILM COATED ORAL at 22:13

## 2022-01-08 RX ADMIN — DULOXETINE 60 MG: 30 CAPSULE, DELAYED RELEASE ORAL at 09:13

## 2022-01-08 RX ADMIN — DICYCLOMINE HYDROCHLORIDE 20 MG: 20 TABLET ORAL at 09:13

## 2022-01-08 RX ADMIN — SODIUM CHLORIDE, PRESERVATIVE FREE 10 ML: 5 INJECTION INTRAVENOUS at 09:14

## 2022-01-08 RX ADMIN — ENOXAPARIN SODIUM 30 MG: 100 INJECTION SUBCUTANEOUS at 22:13

## 2022-01-08 RX ADMIN — GUAIFENESIN 600 MG: 600 TABLET, EXTENDED RELEASE ORAL at 22:13

## 2022-01-08 RX ADMIN — DEXAMETHASONE 6 MG: 4 TABLET ORAL at 09:14

## 2022-01-08 RX ADMIN — DICYCLOMINE HYDROCHLORIDE 20 MG: 20 TABLET ORAL at 05:39

## 2022-01-08 RX ADMIN — SODIUM CHLORIDE, PRESERVATIVE FREE 10 ML: 5 INJECTION INTRAVENOUS at 22:14

## 2022-01-08 RX ADMIN — BARICITINIB 4 MG: 2 TABLET, FILM COATED ORAL at 09:14

## 2022-01-08 RX ADMIN — GUAIFENESIN 600 MG: 600 TABLET, EXTENDED RELEASE ORAL at 09:13

## 2022-01-08 RX ADMIN — ALBUTEROL SULFATE 2 PUFF: 90 AEROSOL, METERED RESPIRATORY (INHALATION) at 21:37

## 2022-01-08 RX ADMIN — FAMOTIDINE 20 MG: 20 TABLET, FILM COATED ORAL at 09:14

## 2022-01-08 RX ADMIN — BUDESONIDE AND FORMOTEROL FUMARATE DIHYDRATE 2 PUFF: 160; 4.5 AEROSOL RESPIRATORY (INHALATION) at 21:36

## 2022-01-08 ASSESSMENT — PAIN SCALES - WONG BAKER: WONGBAKER_NUMERICALRESPONSE: 0

## 2022-01-08 ASSESSMENT — PAIN SCALES - GENERAL
PAINLEVEL_OUTOF10: 0
PAINLEVEL_OUTOF10: 7
PAINLEVEL_OUTOF10: 7

## 2022-01-08 ASSESSMENT — PAIN DESCRIPTION - PAIN TYPE: TYPE: CHRONIC PAIN

## 2022-01-08 NOTE — PROGRESS NOTES
Hospitalist Progress Note      Name:  Avi Rich /Age/Sex: 1960  (64 y.o. female)   MRN & CSN:  [id number] & [id number] Admission Date/Time: 2022  2:09 PM   Location:  [address] [place] [address] PCP: Marquetta Ganser, MD         Hospital Day: 3  Discharge barrier/Reason for continued hospitalization: Hypoxic respiratory failure    Assessment and Plan:   Avi Rich is a 64 y.o.  female class III obesity, COPD, type 2 diabetes, hypertension, depression, COPD who presented to the ED on 2022 with Pneumonia due to COVID-19 virus with acute hypoxic respiratory failure. 1.  Acute metabolic encephalopathy: Due to COVID-19  Improved this AM.  Continue to monitor closely    2.  COVID-19 sepsis POA: Due to COVID-19 pneumonia as evidenced by acute hypoxic respiratory failure with O2 sats less than 89% on room air. Currently on 6 L supplemental O2  Wean off as tolerated  Baricitinib daily for 14 days  Incentive spirometer  Low-dose dexamethasone for 10 days  Prone as tolerated  Given low procalcitonin levels, will observe off antibiotics    3. COVID-19 acute hypoxic respiratory failure: Management as above. 4.  Hyperglycemia due to type 2 diabetes: Likely worsened by steroids. Lispro 30 units AC  Lantus 70 units nightly  Add moderate dose insulin correctional scale    5. Visual hallucination: Likely due to visual impairment  Resume glaucoma medications when available    6. Hypertension: Continue amlodipine/lisinopril. 7. COPD: Continue Symbicort twice daily, theophylline daily. 8.  Class III obesity: Unable to  due to acute illness. Diet ADULT DIET; Regular; 4 carb choices (60 gm/meal)  ADULT ORAL NUTRITION SUPPLEMENT; Breakfast, Lunch, Dinner;  Low Calorie/High Protein Oral Supplement   DVT Prophylaxis [x] Lovenox, []  Heparin, [] SCDs, [] Ambulation   GI Prophylaxis [x] PPI,  [x] H2 Blocker,  [] Carafate,  [] Diet/Tube Feeds   Code Status Full Code   MDM [] Low, [] Moderate,[x] High       History of Present Illness:     Chief Complaint: Pneumonia due to COVID-19 virus     Terrell Padilla is a 64 y.o.  female  who presents with shortness of breath. 1/8/2020: Patient is seen and examined, does not feel short of breath as long as she has oxygen on. I encouraged about self proning and incentive spirometer. She is wanting her eyedrops for her glaucoma. Daughter at bedside reports improvement in her confusion yesterday. Ten point ROS reviewed negative, unless as noted above    Objective:   No intake or output data in the 24 hours ending 01/08/22 1339     Vitals:   Vitals:    01/07/22 2015 01/08/22 0430 01/08/22 0845 01/08/22 0918   BP: 128/62 (!) 105/52 129/65    Pulse: 71 57 62    Resp: 17 18 20 20   Temp: 98.1 °F (36.7 °C) 96.4 °F (35.8 °C) 97.8 °F (36.6 °C)    TempSrc: Oral Oral Oral    SpO2: 91% (!) 89% 92% 90%   Weight:       Height:            Physical Exam:   GEN: Awake female, alert and oriented x 3 in no apparent distress. Appears given age. HEENT: Normal   RESP: Diminished breath sounds bilaterally. Symmetric chest movement while on 6 L  CVS: RRR, S1, S2  GI/: Abdomen is soft, nontender, no organomegaly. . Bowel sounds normal, rectal exam deferred. No CVA tenderness. MSK: No gross joint deformities. No tenderness  SKIN: Normal coloration, warm, dry. NEURO: Cranial nerves appear grossly intact, normal speech, no lateralizing weakness. PSYCH:  Affect appropriate.     Medications:   Medications:    baricitinib  4 mg Oral Daily    insulin glargine  70 Units SubCUTAneous Nightly    cefTRIAXone (ROCEPHIN) IV  1,000 mg IntraVENous Q24H    azithromycin  500 mg IntraVENous Q24H    insulin lispro  30 Units SubCUTAneous TID     guaiFENesin  600 mg Oral BID    DULoxetine  60 mg Oral Daily    pantoprazole  40 mg Oral Daily    theophylline  300 mg Oral Daily    dicyclomine  20 mg Oral Q6H    budesonide-formoterol  2 puff Inhalation BID    pravastatin  20 mg Oral Nightly    naproxen  500 mg Oral BID WC    furosemide  20 mg Oral Daily    sodium chloride flush  5-40 mL IntraVENous 2 times per day    enoxaparin  30 mg SubCUTAneous BID    famotidine  20 mg Oral BID    dexamethasone  6 mg Oral Daily    Vitamin D  2,000 Units Oral Daily    amLODIPine  10 mg Oral Daily    And    lisinopril  40 mg Oral Daily      Infusions:    dextrose      sodium chloride       PRN Meds: glucose, 15 g, PRN  dextrose, 12.5 g, PRN  glucagon (rDNA), 1 mg, PRN  dextrose, 100 mL/hr, PRN  iopamidol, 75 mL, ONCE PRN  ibuprofen, 600 mg, Q6H PRN  albuterol sulfate HFA, 2 puff, Q4H PRN  albuterol, 2.5 mg, Q4H PRN  ondansetron, 4 mg, Q8H PRN  acetaminophen, 500 mg, Q6H PRN  sodium chloride flush, 5-40 mL, PRN  sodium chloride, 25 mL, PRN  ondansetron, 4 mg, Q6H PRN  polyethylene glycol, 17 g, Daily PRN  acetaminophen, 650 mg, Q6H PRN   Or  acetaminophen, 650 mg, Q6H PRN          Electronically signed by Jade Robertson MD on 1/8/2022 at 1:39 PM

## 2022-01-08 NOTE — PROGRESS NOTES
Late entry note  1/7 around 4 pm Notified patient is now confused and has hallucinations. depakote and lyrica were ordered on admission but per the daughter these are old medications and werent prescribed concurrently. When seen she has confusion, describes well formed hallucinations visual and some auditory. Insight not intact-she asked if everyone can see them. Significant change from this am.    -medication list updated  -for now will monitor off these medications. No need for further evaluation at this time.     Catherine Christie MD 12:09 PM 01/08/22

## 2022-01-09 LAB
GLUCOSE BLD-MCNC: 198 MG/DL (ref 70–99)
GLUCOSE BLD-MCNC: 238 MG/DL (ref 70–99)
GLUCOSE BLD-MCNC: 267 MG/DL (ref 70–99)
GLUCOSE BLD-MCNC: 278 MG/DL (ref 70–99)
GLUCOSE BLD-MCNC: 318 MG/DL (ref 70–99)

## 2022-01-09 PROCEDURE — 94640 AIRWAY INHALATION TREATMENT: CPT

## 2022-01-09 PROCEDURE — 6370000000 HC RX 637 (ALT 250 FOR IP): Performed by: INTERNAL MEDICINE

## 2022-01-09 PROCEDURE — 6360000002 HC RX W HCPCS: Performed by: INTERNAL MEDICINE

## 2022-01-09 PROCEDURE — 94761 N-INVAS EAR/PLS OXIMETRY MLT: CPT

## 2022-01-09 PROCEDURE — 87899 AGENT NOS ASSAY W/OPTIC: CPT

## 2022-01-09 PROCEDURE — 87449 NOS EACH ORGANISM AG IA: CPT

## 2022-01-09 PROCEDURE — 82962 GLUCOSE BLOOD TEST: CPT

## 2022-01-09 PROCEDURE — 2580000003 HC RX 258: Performed by: INTERNAL MEDICINE

## 2022-01-09 PROCEDURE — 1200000000 HC SEMI PRIVATE

## 2022-01-09 PROCEDURE — 2700000000 HC OXYGEN THERAPY PER DAY

## 2022-01-09 RX ADMIN — INSULIN LISPRO 2 UNITS: 100 INJECTION, SOLUTION INTRAVENOUS; SUBCUTANEOUS at 08:51

## 2022-01-09 RX ADMIN — SODIUM CHLORIDE, PRESERVATIVE FREE 10 ML: 5 INJECTION INTRAVENOUS at 21:17

## 2022-01-09 RX ADMIN — FAMOTIDINE 20 MG: 20 TABLET, FILM COATED ORAL at 21:08

## 2022-01-09 RX ADMIN — BUDESONIDE AND FORMOTEROL FUMARATE DIHYDRATE 2 PUFF: 160; 4.5 AEROSOL RESPIRATORY (INHALATION) at 10:49

## 2022-01-09 RX ADMIN — INSULIN LISPRO 4 UNITS: 100 INJECTION, SOLUTION INTRAVENOUS; SUBCUTANEOUS at 13:18

## 2022-01-09 RX ADMIN — DULOXETINE 60 MG: 30 CAPSULE, DELAYED RELEASE ORAL at 08:50

## 2022-01-09 RX ADMIN — LATANOPROST 1 DROP: 50 SOLUTION/ DROPS OPHTHALMIC at 21:17

## 2022-01-09 RX ADMIN — FUROSEMIDE 20 MG: 20 TABLET ORAL at 08:50

## 2022-01-09 RX ADMIN — DICYCLOMINE HYDROCHLORIDE 20 MG: 20 TABLET ORAL at 16:00

## 2022-01-09 RX ADMIN — INSULIN LISPRO 6 UNITS: 100 INJECTION, SOLUTION INTRAVENOUS; SUBCUTANEOUS at 18:13

## 2022-01-09 RX ADMIN — THEOPHYLLINE ANHYDROUS 300 MG: 100 CAPSULE, EXTENDED RELEASE ORAL at 08:50

## 2022-01-09 RX ADMIN — LISINOPRIL 40 MG: 20 TABLET ORAL at 08:50

## 2022-01-09 RX ADMIN — PANTOPRAZOLE SODIUM 40 MG: 40 TABLET, DELAYED RELEASE ORAL at 08:50

## 2022-01-09 RX ADMIN — AMLODIPINE BESYLATE 10 MG: 5 TABLET ORAL at 08:50

## 2022-01-09 RX ADMIN — GUAIFENESIN 600 MG: 600 TABLET, EXTENDED RELEASE ORAL at 08:50

## 2022-01-09 RX ADMIN — Medication 500 MG: at 11:44

## 2022-01-09 RX ADMIN — Medication 500 MG: at 18:11

## 2022-01-09 RX ADMIN — INSULIN GLARGINE 70 UNITS: 100 INJECTION, SOLUTION SUBCUTANEOUS at 21:09

## 2022-01-09 RX ADMIN — GUAIFENESIN 600 MG: 600 TABLET, EXTENDED RELEASE ORAL at 21:08

## 2022-01-09 RX ADMIN — BUDESONIDE AND FORMOTEROL FUMARATE DIHYDRATE 2 PUFF: 160; 4.5 AEROSOL RESPIRATORY (INHALATION) at 19:09

## 2022-01-09 RX ADMIN — ENOXAPARIN SODIUM 30 MG: 100 INJECTION SUBCUTANEOUS at 21:08

## 2022-01-09 RX ADMIN — DICYCLOMINE HYDROCHLORIDE 20 MG: 20 TABLET ORAL at 04:02

## 2022-01-09 RX ADMIN — Medication 2000 UNITS: at 11:44

## 2022-01-09 RX ADMIN — BARICITINIB 4 MG: 2 TABLET, FILM COATED ORAL at 08:51

## 2022-01-09 RX ADMIN — PRAVASTATIN SODIUM 20 MG: 10 TABLET ORAL at 21:08

## 2022-01-09 RX ADMIN — ENOXAPARIN SODIUM 30 MG: 100 INJECTION SUBCUTANEOUS at 08:51

## 2022-01-09 RX ADMIN — DICYCLOMINE HYDROCHLORIDE 20 MG: 20 TABLET ORAL at 08:50

## 2022-01-09 RX ADMIN — ALBUTEROL SULFATE 2 PUFF: 90 AEROSOL, METERED RESPIRATORY (INHALATION) at 10:49

## 2022-01-09 RX ADMIN — SODIUM CHLORIDE, PRESERVATIVE FREE 10 ML: 5 INJECTION INTRAVENOUS at 11:44

## 2022-01-09 RX ADMIN — DEXAMETHASONE 6 MG: 4 TABLET ORAL at 08:50

## 2022-01-09 RX ADMIN — DICYCLOMINE HYDROCHLORIDE 20 MG: 20 TABLET ORAL at 21:08

## 2022-01-09 RX ADMIN — FAMOTIDINE 20 MG: 20 TABLET, FILM COATED ORAL at 08:50

## 2022-01-09 ASSESSMENT — PAIN SCALES - GENERAL
PAINLEVEL_OUTOF10: 5
PAINLEVEL_OUTOF10: 8

## 2022-01-09 ASSESSMENT — PAIN DESCRIPTION - LOCATION
LOCATION: BACK;LEG
LOCATION: BACK

## 2022-01-09 ASSESSMENT — PAIN DESCRIPTION - PAIN TYPE
TYPE: CHRONIC PAIN
TYPE: CHRONIC PAIN

## 2022-01-09 NOTE — PROGRESS NOTES
Pt educated on how to use incentive spirometer. Pt performed return demonstration and reached 1000ml.

## 2022-01-09 NOTE — PROGRESS NOTES
Hospitalist Progress Note      Name:  Rocky Bermeo /Age/Sex: 1960  (64 y.o. female)   MRN & CSN:  [id number] & [id number] Admission Date/Time: 2022  2:09 PM   Location:  [address] [place] [address] PCP: Gildardo Severino MD         Hospital Day: 4  Discharge barrier/Reason for continued hospitalization: Hypoxic respiratory failure    Assessment and Plan:   Rocky Bermeo is a 64 y.o.  female class III obesity, COPD, type 2 diabetes, hypertension, depression, COPD who presented to the ED on 2022 with Pneumonia due to COVID-19 virus with acute hypoxic respiratory failure. 1.  Acute metabolic encephalopathy: Due to COVID-19  Encephalopathy resolved. Continue close monitoring. 2.  COVID-19 sepsis POA: See progress note 2022  Remains on 6 L supplemental O2. Wean off. Baricitinib daily for 14 days  ICS, prone as tolerated. Low-dose dexamethasone for 10 days  Given low procalcitonin levels, will observe off antibiotics    3. COVID-19 acute hypoxic respiratory failure: Management as above. 4.  Hyperglycemia due to type 2 diabetes: Though worsened by steroids, poorly controlled to begin with. A1c 9.3. Lispro 30 units AC  Lantus 70 units nightly  Continue moderate dose insulin correctional scale    5. Visual hallucination: Likely due to visual impairment  Resume glaucoma medications when available    6. Hypertension: Continue amlodipine/lisinopril. 7. COPD: Continue Symbicort twice daily, theophylline daily. 8.  Class III obesity: Unable to  due to acute illness. Diet ADULT DIET; Regular; 4 carb choices (60 gm/meal)  ADULT ORAL NUTRITION SUPPLEMENT; Breakfast, Lunch, Dinner;  Low Calorie/High Protein Oral Supplement   DVT Prophylaxis [x] Lovenox, []  Heparin, [] SCDs, [] Ambulation   GI Prophylaxis [x] PPI,  [x] H2 Blocker,  [] Carafate,  [] Diet/Tube Feeds   Code Status Full Code   MDM [] Low, [] Moderate,[x]  High       History of Present Illness:     Chief Complaint: Pneumonia due to COVID-19 virus     Chely Richter is a 64 y.o.  female  who presents with shortness of breath. 1/8/2022: Patient is seen and examined, does not feel short of breath as long as she has oxygen on. I encouraged about self proning and incentive spirometer. She is wanting her eyedrops for her glaucoma. Daughter at bedside reports improvement in her confusion yesterday. 1/9/2022: Patient is seen and examined, has no new complaints. Wants to know what she needs to do to facilitate her recovery. I encouraged deep breathing exercises, incentive spirometer, self proning etc.    Ten point ROS reviewed negative, unless as noted above    Objective:   No intake or output data in the 24 hours ending 01/09/22 1231     Vitals:   Vitals:    01/08/22 1700 01/08/22 2038 01/09/22 0357 01/09/22 0830   BP: (!) 140/61 128/86 (!) 150/56 (!) 157/55   Pulse: 93 54 60 50   Resp: 18 16  16   Temp:  97.9 °F (36.6 °C) 97.8 °F (36.6 °C) 97.5 °F (36.4 °C)   TempSrc: Oral Oral Oral Oral   SpO2: 92% 92% 95% 93%   Weight:       Height:            Physical Exam:   GEN: Awake female, alert and oriented x 3 in no apparent distress. Appears given age. HEENT: Normal   RESP: Diminished breath sounds bilaterally. Symmetric chest movement while on 6 L  CVS: RRR, S1, S2  GI/: Abdomen is soft, nontender, no organomegaly. . Bowel sounds normal, rectal exam deferred. No CVA tenderness. MSK: No gross joint deformities. No tenderness  SKIN: Normal coloration, warm, dry. NEURO: Cranial nerves appear grossly intact, normal speech, no lateralizing weakness. PSYCH:  Affect appropriate.     Medications:   Medications:    insulin lispro  0-12 Units SubCUTAneous TID WC    insulin lispro  0-6 Units SubCUTAneous Nightly    latanoprost  1 drop Both Eyes Nightly    baricitinib  4 mg Oral Daily    insulin glargine  70 Units SubCUTAneous Nightly    insulin lispro  30 Units SubCUTAneous TID WC    guaiFENesin  600 mg Oral BID    DULoxetine  60 mg Oral Daily    pantoprazole  40 mg Oral Daily    theophylline  300 mg Oral Daily    dicyclomine  20 mg Oral Q6H    budesonide-formoterol  2 puff Inhalation BID    pravastatin  20 mg Oral Nightly    naproxen  500 mg Oral BID WC    furosemide  20 mg Oral Daily    sodium chloride flush  5-40 mL IntraVENous 2 times per day    enoxaparin  30 mg SubCUTAneous BID    famotidine  20 mg Oral BID    dexamethasone  6 mg Oral Daily    Vitamin D  2,000 Units Oral Daily    amLODIPine  10 mg Oral Daily    And    lisinopril  40 mg Oral Daily      Infusions:    dextrose      sodium chloride       PRN Meds: glucose, 15 g, PRN  dextrose, 12.5 g, PRN  glucagon (rDNA), 1 mg, PRN  dextrose, 100 mL/hr, PRN  iopamidol, 75 mL, ONCE PRN  ibuprofen, 600 mg, Q6H PRN  albuterol sulfate HFA, 2 puff, Q4H PRN  albuterol, 2.5 mg, Q4H PRN  ondansetron, 4 mg, Q8H PRN  acetaminophen, 500 mg, Q6H PRN  sodium chloride flush, 5-40 mL, PRN  sodium chloride, 25 mL, PRN  ondansetron, 4 mg, Q6H PRN  polyethylene glycol, 17 g, Daily PRN  acetaminophen, 650 mg, Q6H PRN   Or  acetaminophen, 650 mg, Q6H PRN          Electronically signed by Bjorn Villarreal MD on 1/9/2022 at 12:31 PM

## 2022-01-10 LAB
GLUCOSE BLD-MCNC: 145 MG/DL (ref 70–99)
GLUCOSE BLD-MCNC: 157 MG/DL (ref 70–99)
GLUCOSE BLD-MCNC: 158 MG/DL (ref 70–99)
GLUCOSE BLD-MCNC: 263 MG/DL (ref 70–99)
GLUCOSE BLD-MCNC: 306 MG/DL (ref 70–99)
LEGIONELLA URINARY AG: NEGATIVE
STREP PNEUMONIAE ANTIGEN: NORMAL

## 2022-01-10 PROCEDURE — 82962 GLUCOSE BLOOD TEST: CPT

## 2022-01-10 PROCEDURE — 94761 N-INVAS EAR/PLS OXIMETRY MLT: CPT

## 2022-01-10 PROCEDURE — 2580000003 HC RX 258: Performed by: INTERNAL MEDICINE

## 2022-01-10 PROCEDURE — 6370000000 HC RX 637 (ALT 250 FOR IP): Performed by: INTERNAL MEDICINE

## 2022-01-10 PROCEDURE — 1200000000 HC SEMI PRIVATE

## 2022-01-10 PROCEDURE — 6360000002 HC RX W HCPCS: Performed by: INTERNAL MEDICINE

## 2022-01-10 PROCEDURE — 2700000000 HC OXYGEN THERAPY PER DAY

## 2022-01-10 RX ADMIN — DICYCLOMINE HYDROCHLORIDE 20 MG: 20 TABLET ORAL at 03:40

## 2022-01-10 RX ADMIN — GUAIFENESIN 600 MG: 600 TABLET, EXTENDED RELEASE ORAL at 21:17

## 2022-01-10 RX ADMIN — Medication 500 MG: at 18:08

## 2022-01-10 RX ADMIN — FAMOTIDINE 20 MG: 20 TABLET, FILM COATED ORAL at 21:17

## 2022-01-10 RX ADMIN — SODIUM CHLORIDE, PRESERVATIVE FREE 10 ML: 5 INJECTION INTRAVENOUS at 21:19

## 2022-01-10 RX ADMIN — DICYCLOMINE HYDROCHLORIDE 20 MG: 20 TABLET ORAL at 13:13

## 2022-01-10 RX ADMIN — LATANOPROST 1 DROP: 50 SOLUTION/ DROPS OPHTHALMIC at 21:19

## 2022-01-10 RX ADMIN — BARICITINIB 4 MG: 2 TABLET, FILM COATED ORAL at 13:12

## 2022-01-10 RX ADMIN — INSULIN LISPRO 6 UNITS: 100 INJECTION, SOLUTION INTRAVENOUS; SUBCUTANEOUS at 18:09

## 2022-01-10 RX ADMIN — ENOXAPARIN SODIUM 30 MG: 100 INJECTION SUBCUTANEOUS at 21:17

## 2022-01-10 RX ADMIN — DEXAMETHASONE 6 MG: 4 TABLET ORAL at 13:12

## 2022-01-10 RX ADMIN — PANTOPRAZOLE SODIUM 40 MG: 40 TABLET, DELAYED RELEASE ORAL at 13:12

## 2022-01-10 RX ADMIN — DICYCLOMINE HYDROCHLORIDE 20 MG: 20 TABLET ORAL at 18:09

## 2022-01-10 RX ADMIN — FAMOTIDINE 20 MG: 20 TABLET, FILM COATED ORAL at 13:13

## 2022-01-10 RX ADMIN — THEOPHYLLINE ANHYDROUS 300 MG: 100 CAPSULE, EXTENDED RELEASE ORAL at 13:11

## 2022-01-10 RX ADMIN — GUAIFENESIN 600 MG: 600 TABLET, EXTENDED RELEASE ORAL at 13:12

## 2022-01-10 RX ADMIN — DULOXETINE 60 MG: 30 CAPSULE, DELAYED RELEASE ORAL at 13:13

## 2022-01-10 RX ADMIN — INSULIN LISPRO 2 UNITS: 100 INJECTION, SOLUTION INTRAVENOUS; SUBCUTANEOUS at 13:23

## 2022-01-10 RX ADMIN — INSULIN GLARGINE 70 UNITS: 100 INJECTION, SOLUTION SUBCUTANEOUS at 21:01

## 2022-01-10 RX ADMIN — ENOXAPARIN SODIUM 30 MG: 100 INJECTION SUBCUTANEOUS at 13:14

## 2022-01-10 RX ADMIN — DICYCLOMINE HYDROCHLORIDE 20 MG: 20 TABLET ORAL at 21:17

## 2022-01-10 RX ADMIN — PRAVASTATIN SODIUM 20 MG: 10 TABLET ORAL at 21:17

## 2022-01-10 ASSESSMENT — PAIN SCALES - GENERAL: PAINLEVEL_OUTOF10: 5

## 2022-01-10 NOTE — CARE COORDINATION
Chart reviewed and attempted to call pt to initiate discharge planning. No answer on pt room phone, call to pt spouse Rosalind Gonzalez. CM introduced self and explained role. Pt is from home w/ spouse and was active with [facility]. Pt has PCP and insurance with RX coverage. Pt has walker, cane in the home currently. Rosalind Gonzalez plans for pt to return home w/ [facility] at discharge and states he will be able to provide transport. Call to [facility] to clarify that pt is active, spoke with [facility], who confirmed that pt is active for aide services. Pt will need a home O2 eval if oxygen needed at discharge please. Discharging nurse, please call [id number] [facility] @ [phone] to notify them of discharge. Please fax H&P, discharge summary and AVS to [phone].

## 2022-01-10 NOTE — PROGRESS NOTES
Hospitalist Progress Note      Name:  Terrell Padilla /Age/Sex: 1960  (64 y.o. female)   MRN & CSN:  [id number] & [id number] Admission Date/Time: 2022  2:09 PM   Location:  [address] [place] [address] PCP: Pavan Barnes MD         Hospital Day: 5  Discharge barrier/Reason for continued hospitalization: Acute hypoxic respiratory failure. Weaned from 5 L to 3 L today. Home with home health care in the next 1 to 2 days. Assessment and Plan:   Terrell Padilla is a 64 y.o.  female class III obesity, COPD, type 2 diabetes, hypertension, depression, COPD who presented to the ED on 2022 with Pneumonia due to COVID-19 virus with acute hypoxic respiratory failure. 1.  KNIOC-96 acute metabolic encephalopathy: Encephalopathy resolved. 2.  COVID-19 sepsis POA: See progress note 2022  Was able to wean down to 3 L today. Baricitinib daily for 14 days  Continue incentive spirometer. Continue proning as tolerated. Low-dose dexamethasone for 10 days  Given low procalcitonin levels, will observe off antibiotics  Continue Mucinex twice daily    3.  COVID-19 acute hypoxic respiratory failure: Management as above. 4.  Hyperglycemia due to steroids and type 2 diabetes:  A1c 9.3. Will discontinue lispro 30 units AC for now. Lantus 70 units nightly   Continue moderate dose insulin correctional scale    5. Visual hallucination: Likely due to visual impairment  Now resolved. 6.  Hypertension: Continue amlodipine/lisinopril. 7. COPD: Continue Symbicort twice daily, theophylline daily. 8.  Class III obesity: Unable to  due to acute illness. Diet ADULT DIET; Regular; 4 carb choices (60 gm/meal)  ADULT ORAL NUTRITION SUPPLEMENT; Breakfast, Lunch, Dinner;  Low Calorie/High Protein Oral Supplement   DVT Prophylaxis [x] Lovenox, []  Heparin, [] SCDs, [] Ambulation   GI Prophylaxis [x] PPI,  [x] H2 Blocker,  [] Carafate,  [] Diet/Tube Feeds   Code Status Full Code   MDM [] Low, [] Moderate,[x] insulin lispro  0-12 Units SubCUTAneous TID     insulin lispro  0-6 Units SubCUTAneous Nightly    latanoprost  1 drop Both Eyes Nightly    baricitinib  4 mg Oral Daily    insulin glargine  70 Units SubCUTAneous Nightly    insulin lispro  30 Units SubCUTAneous TID     guaiFENesin  600 mg Oral BID    DULoxetine  60 mg Oral Daily    pantoprazole  40 mg Oral Daily    theophylline  300 mg Oral Daily    dicyclomine  20 mg Oral Q6H    budesonide-formoterol  2 puff Inhalation BID    pravastatin  20 mg Oral Nightly    naproxen  500 mg Oral BID WC    furosemide  20 mg Oral Daily    sodium chloride flush  5-40 mL IntraVENous 2 times per day    enoxaparin  30 mg SubCUTAneous BID    famotidine  20 mg Oral BID    dexamethasone  6 mg Oral Daily    Vitamin D  2,000 Units Oral Daily    amLODIPine  10 mg Oral Daily    And    lisinopril  40 mg Oral Daily      Infusions:    dextrose      sodium chloride       PRN Meds: glucose, 15 g, PRN  dextrose, 12.5 g, PRN  glucagon (rDNA), 1 mg, PRN  dextrose, 100 mL/hr, PRN  iopamidol, 75 mL, ONCE PRN  ibuprofen, 600 mg, Q6H PRN  albuterol sulfate HFA, 2 puff, Q4H PRN  albuterol, 2.5 mg, Q4H PRN  ondansetron, 4 mg, Q8H PRN  acetaminophen, 500 mg, Q6H PRN  sodium chloride flush, 5-40 mL, PRN  sodium chloride, 25 mL, PRN  ondansetron, 4 mg, Q6H PRN  polyethylene glycol, 17 g, Daily PRN  acetaminophen, 650 mg, Q6H PRN   Or  acetaminophen, 650 mg, Q6H PRN          Electronically signed by Raghu Avila MD on 1/10/2022 at 1:19 PM

## 2022-01-11 LAB
GLUCOSE BLD-MCNC: 158 MG/DL (ref 70–99)
GLUCOSE BLD-MCNC: 228 MG/DL (ref 70–99)
GLUCOSE BLD-MCNC: 280 MG/DL (ref 70–99)

## 2022-01-11 PROCEDURE — 94640 AIRWAY INHALATION TREATMENT: CPT

## 2022-01-11 PROCEDURE — 2700000000 HC OXYGEN THERAPY PER DAY

## 2022-01-11 PROCEDURE — 97535 SELF CARE MNGMENT TRAINING: CPT

## 2022-01-11 PROCEDURE — 6370000000 HC RX 637 (ALT 250 FOR IP): Performed by: INTERNAL MEDICINE

## 2022-01-11 PROCEDURE — 82962 GLUCOSE BLOOD TEST: CPT

## 2022-01-11 PROCEDURE — 1200000000 HC SEMI PRIVATE

## 2022-01-11 PROCEDURE — 2580000003 HC RX 258: Performed by: INTERNAL MEDICINE

## 2022-01-11 PROCEDURE — 6360000002 HC RX W HCPCS: Performed by: INTERNAL MEDICINE

## 2022-01-11 PROCEDURE — 94761 N-INVAS EAR/PLS OXIMETRY MLT: CPT

## 2022-01-11 PROCEDURE — 97165 OT EVAL LOW COMPLEX 30 MIN: CPT

## 2022-01-11 RX ADMIN — BUDESONIDE AND FORMOTEROL FUMARATE DIHYDRATE 2 PUFF: 160; 4.5 AEROSOL RESPIRATORY (INHALATION) at 22:02

## 2022-01-11 RX ADMIN — LATANOPROST 1 DROP: 50 SOLUTION/ DROPS OPHTHALMIC at 23:31

## 2022-01-11 RX ADMIN — SODIUM CHLORIDE, PRESERVATIVE FREE 10 ML: 5 INJECTION INTRAVENOUS at 23:32

## 2022-01-11 RX ADMIN — DICYCLOMINE HYDROCHLORIDE 20 MG: 20 TABLET ORAL at 17:39

## 2022-01-11 RX ADMIN — DICYCLOMINE HYDROCHLORIDE 20 MG: 20 TABLET ORAL at 09:05

## 2022-01-11 RX ADMIN — SODIUM CHLORIDE, PRESERVATIVE FREE 10 ML: 5 INJECTION INTRAVENOUS at 09:10

## 2022-01-11 RX ADMIN — Medication 2000 UNITS: at 09:04

## 2022-01-11 RX ADMIN — BUDESONIDE AND FORMOTEROL FUMARATE DIHYDRATE 2 PUFF: 160; 4.5 AEROSOL RESPIRATORY (INHALATION) at 09:31

## 2022-01-11 RX ADMIN — THEOPHYLLINE ANHYDROUS 300 MG: 100 CAPSULE, EXTENDED RELEASE ORAL at 09:02

## 2022-01-11 RX ADMIN — INSULIN GLARGINE 70 UNITS: 100 INJECTION, SOLUTION SUBCUTANEOUS at 23:30

## 2022-01-11 RX ADMIN — ENOXAPARIN SODIUM 30 MG: 100 INJECTION SUBCUTANEOUS at 09:06

## 2022-01-11 RX ADMIN — PRAVASTATIN SODIUM 20 MG: 10 TABLET ORAL at 23:23

## 2022-01-11 RX ADMIN — DULOXETINE 60 MG: 30 CAPSULE, DELAYED RELEASE ORAL at 09:04

## 2022-01-11 RX ADMIN — ENOXAPARIN SODIUM 30 MG: 100 INJECTION SUBCUTANEOUS at 23:23

## 2022-01-11 RX ADMIN — Medication 500 MG: at 09:10

## 2022-01-11 RX ADMIN — ALBUTEROL SULFATE 2 PUFF: 90 AEROSOL, METERED RESPIRATORY (INHALATION) at 09:31

## 2022-01-11 RX ADMIN — FAMOTIDINE 20 MG: 20 TABLET, FILM COATED ORAL at 09:05

## 2022-01-11 RX ADMIN — Medication 500 MG: at 17:39

## 2022-01-11 RX ADMIN — DICYCLOMINE HYDROCHLORIDE 20 MG: 20 TABLET ORAL at 23:34

## 2022-01-11 RX ADMIN — GUAIFENESIN 600 MG: 600 TABLET, EXTENDED RELEASE ORAL at 09:04

## 2022-01-11 RX ADMIN — INSULIN LISPRO 6 UNITS: 100 INJECTION, SOLUTION INTRAVENOUS; SUBCUTANEOUS at 18:02

## 2022-01-11 RX ADMIN — GUAIFENESIN 600 MG: 600 TABLET, EXTENDED RELEASE ORAL at 23:23

## 2022-01-11 RX ADMIN — DICYCLOMINE HYDROCHLORIDE 20 MG: 20 TABLET ORAL at 03:25

## 2022-01-11 RX ADMIN — DEXAMETHASONE 6 MG: 4 TABLET ORAL at 09:05

## 2022-01-11 RX ADMIN — BARICITINIB 4 MG: 2 TABLET, FILM COATED ORAL at 09:03

## 2022-01-11 RX ADMIN — PANTOPRAZOLE SODIUM 40 MG: 40 TABLET, DELAYED RELEASE ORAL at 09:05

## 2022-01-11 RX ADMIN — FAMOTIDINE 20 MG: 20 TABLET, FILM COATED ORAL at 23:23

## 2022-01-11 ASSESSMENT — PAIN SCALES - GENERAL
PAINLEVEL_OUTOF10: 1
PAINLEVEL_OUTOF10: 2
PAINLEVEL_OUTOF10: 0

## 2022-01-11 NOTE — PROGRESS NOTES
Occupational Therapy    Formerly [facility] ACUTE CARE OCCUPATIONAL THERAPY EVALUATION  Ramana Amos, 1960, 4003/4003-A, 1/11/2022    History  [place]:  The primary encounter diagnosis was Acute respiratory failure due to COVID-19 [facility]). A diagnosis of Multifocal pneumonia was also pertinent to this visit. Patient  has a past medical history of Arthritis, Asthma, Bipolar 1 disorder ([address] COPD (chronic obstructive pulmonary disease) ([address] Depression, Diabetes mellitus ([facility] [address] Edema, Glaucoma, History of Doppler ultrasound, History of nuclear stress test, Hx of Doppler echocardiogram, Hyperlipidemia, Hypertension, and IBS (irritable bowel syndrome). Patient  has a past surgical history that includes Tubal ligation. Subjective:  Patient states:  \"I've been getting up and walking to the bathroom\". Pain:  NO.    Communication with other providers:  Handoff to RN  Restrictions: Droplet Plus, General Precautions, Fall Risk    Home Setup/Prior level of function  Social/Functional History  Lives With: Family  Type of Home: House  Home Layout: Two level,Able to Live on Main level with bedroom/bathroom  Home Access: Ramped entrance  Bathroom Shower/Tub: Tub/Shower unit  Bathroom Toilet: Standard  [facility] [address] [facility]  ADL Assistance: Independent  Homemaking Assistance: Independent  Homemaking Responsibilities: Yes  Ambulation Assistance: Independent  Transfer Assistance: Independent  Active : Yes  Mode of Transportation: Car  Occupation: On disability  Additional Comments: Pt reports 4 recent falls due to passing out    Examination of body systems (includes body structures/functions, activity/participation limitations):  · Observation:  Supine in bed upon arrival, agreeable to therapy  · Vision:  Glasses  · Hearing:  [employer] [place]  · Cardiopulmonary:  3L of 02. Min SOB after performing functional mobility      Body Systems and functions:  · ROM R/L:  WFL.     · Strength R/L:  4+/5,   · Sensation: WFL  · Tone: Normal  · Coordination: WFL  · Perception: WNL    Activities of Daily Living (ADLs):  · Feeding: Independent  · Grooming: SBA (washing hands in stand)  · UB bathing: Supervision  · LB bathing: SBA (washing evy area after performing toileting)  · UB dressing: Supervision  · LB dressing: SBA (donning socks in figure 4 position sitting EOB)  · Toileting: SBA (pt toileted this session on standard commode, see LB bathing/dressing for details)    Cognitive and Psychosocial Functioning:  · Overall cognitive status: WNL  · Affect: Normal        Mobility:  · Supine to sit:  Supervision  · Transfers: SBA from EOB up to stand  · Sitting balance:  Supervision. · Standing balance:  SBA  · Functional mobility: SBA ~40 feet. · Toilet Transfers: SBA w/ use of grab bars to/from standard commode             AM-PAC Daily Activity Inpatient   How much help for putting on and taking off regular lower body clothing?: A Little  How much help for Bathing?: A Little  How much help for Toileting?: A Little  How much help for putting on and taking off regular upper body clothing?: None  How much help for taking care of personal grooming?: A Little  How much help for eating meals?: None  AM-[facility] Inpatient Daily Activity Raw Score: 20  AM-PAC Inpatient ADL T-Scale Score : 42.03  ADL Inpatient CMS 0-100% Score: 38.32  ADL Inpatient CMS G-Code Modifier : CJ    Treatment:  Self Care Training:   Cues were given for safety, sequence, UE/LE placement, visual cues, and balance. Activities performed today included toileting, toilet transfer, LB bathing/dressing, grooming    Safety: patient left in bed with bed alarm, call light within reach, RN notified, gait belt used. Assessment:  Pt is a 65 yo female admitted from home for COVID-19. Pt at baseline is Independent for ADLs Independent for high level IADLs and Independent for functional transfers/mobility w/ AD.  Pt currently presents w/ deficits in ADL and high level IADL independence, functional activity tolerance, dynamic sitting and standing balance and tolerance and functional transfers, BUE strength and SOB. Pt would benefit from continued acute care OT services w/ discharge to home w/ home health OT S1 w/ assist PRN  Complexity: Low  Prognosis: Good, no significant barriers to participation at this time.    Plan  Times per week: 1x+  Times per day: Daily  Current Treatment Recommendations: Strengthening,Endurance Training,Patient/Caregiver Education & Training,Equipment Evaluation, Education, & procurement,Self-Care / Howie Burgos Mobility Training,Positioning,Safety Education & Training,Home Management Training     Equipment: defer    Goals:  Pt goal: go home  Time Frame for STGs: discharge  Goal 1: Pt will perform UE ADLs Independent  Goal 2: Pt will perform LE ADLs Independent  Goal 3: Pt will perform toileting Independent  Goal 4: Pt will perform functional transfer w/ AD Ioana  Goal 5: Pt will perform functional mobility w/ AD Ioana  Goal 6: Pt will perform therex/theract in order to increase functional activity tolerance and dynamic standing balance    Treatment plan:  Pt will perform functional task in stand reaching in all 3 planes in order to increase dynamic standing balance and functional activity tolerance    Recommendations for NURSING activity: Up to chair for all 3 meals and up to standard commode for all toileting needs    Time:   Time in: 0936  Time out: 0949  Timed treatment minutes: 8 minutes  Total time: 13 minutes    Electronically signed by:    Neida GODINEZ/L [id number]  10:44 AM,1/11/2022

## 2022-01-11 NOTE — PROGRESS NOTES
Hospitalist Progress Note      Name:  Brice Morin /Age/Sex: 1960  (64 y.o. female)   MRN & CSN:  [id number] & [id number] Admission Date/Time: 2022  2:09 PM   Location:  [address] [place] [address] PCP: Tanisha Mg MD         Hospital Day: 6  Discharge barrier/Reason for continued hospitalization: Weaning off O2, expecting discharge home tomorrow    Assessment and Plan:   Brice Morin is a 64 y.o.  female class III obesity, COPD, type 2 diabetes, hypertension, depression, COPD who presented to the ED on 2022 with Pneumonia due to COVID-19 virus with acute hypoxic respiratory failure. Acute metabolic encephalopathy: Encephalopathy resolved. OVID-19 with acute hypoxic respiratory failure  unvaccinated  Continue weaning off oxygen, currently on 2 L  Continue steroid therapy  May need home O2    Diabetes mellitus type 2 with obesity  -Continue Lantus, not getting mealtime insulin secondary to poor oral intake but currently improving. We will continue monitoring blood sugar     Hypertension: Continue amlodipine/lisinopril. COPD: Continue Symbicort twice daily, theophylline daily. Class III obesity: Unable to  due to acute illness. Diet ADULT DIET; Regular; 4 carb choices (60 gm/meal)  ADULT ORAL NUTRITION SUPPLEMENT; Breakfast, Lunch, Dinner; Low Calorie/High Protein Oral Supplement   DVT Prophylaxis [x] Lovenox, []  Heparin, [] SCDs, [] Ambulation   GI Prophylaxis [x] PPI,  [x] H2 Blocker,  [] Carafate,  [] Diet/Tube Feeds   Code Status Full Code   MDM [] Low, [] Moderate,[x]  High       History of Present Illness:     Chief Complaint: Pneumonia due to COVID-19 virus     Brice Morin is a 64 y.o.  female  who presents with shortness of breath. Patient seen and examined, reports continued improvement of shortness of breath with mobility to the bathroom. Currently on 2 L, no other complaint. Appetite is improving.     Objective:     No intake or output data in the 24 hours ending 01/11/22 1404     Vitals:   Vitals:    01/10/22 1943 01/11/22 0800 01/11/22 0930 01/11/22 1218   BP: (!) 147/69 (!) 168/88     Pulse: 51 51     Resp: 18 18     Temp: 97.9 °F (36.6 °C) 98.1 °F (36.7 °C)     TempSrc: Oral Oral     SpO2: 95% 95% 95% 93%   Weight:       Height:            Physical Exam:   Gen: Not in distress. Alert. Obese  Head: Normocephalic. Atraumatic. Eyes: Conjunctivae/corneas clear. ENT: Oral mucosa moist  Neck: No JVD. No obvious thyromegaly. CVS: Nml S1S2, no murmur , RRR  Pulmomary: Reduced air entry in the bases  Gastrointestinal: Soft, non tender, non distend, . Musculoskeletal: No edema. Warm  Neuro: No focal deficit. Moves extremity spontaneously. Psychiatry: Appropriate affect. Not agitated.       Medications:   Medications:    insulin lispro  0-12 Units SubCUTAneous TID WC    insulin lispro  0-6 Units SubCUTAneous Nightly    latanoprost  1 drop Both Eyes Nightly    baricitinib  4 mg Oral Daily    insulin glargine  70 Units SubCUTAneous Nightly    guaiFENesin  600 mg Oral BID    DULoxetine  60 mg Oral Daily    pantoprazole  40 mg Oral Daily    theophylline  300 mg Oral Daily    dicyclomine  20 mg Oral Q6H    budesonide-formoterol  2 puff Inhalation BID    pravastatin  20 mg Oral Nightly    naproxen  500 mg Oral BID WC    furosemide  20 mg Oral Daily    sodium chloride flush  5-40 mL IntraVENous 2 times per day    enoxaparin  30 mg SubCUTAneous BID    famotidine  20 mg Oral BID    dexamethasone  6 mg Oral Daily    Vitamin D  2,000 Units Oral Daily    amLODIPine  10 mg Oral Daily    And    lisinopril  40 mg Oral Daily      Infusions:    dextrose      sodium chloride       PRN Meds: glucose, 15 g, PRN  dextrose, 12.5 g, PRN  glucagon (rDNA), 1 mg, PRN  dextrose, 100 mL/hr, PRN  iopamidol, 75 mL, ONCE PRN  ibuprofen, 600 mg, Q6H PRN  albuterol sulfate HFA, 2 puff, Q4H PRN  albuterol, 2.5 mg, Q4H PRN  ondansetron, 4 mg, Q8H PRN  acetaminophen, 500 mg, Q6H PRN  sodium chloride flush, 5-40 mL, PRN  sodium chloride, 25 mL, PRN  ondansetron, 4 mg, Q6H PRN  polyethylene glycol, 17 g, Daily PRN  acetaminophen, 650 mg, Q6H PRN   Or  acetaminophen, 650 mg, Q6H PRN          Electronically signed by Priya Fernandes MD on 1/11/2022 at 2:04 PM

## 2022-01-11 NOTE — PROGRESS NOTES
01/11/22 1218   Oxygen Therapy/Pulse Ox   O2 Therapy Room air   O2 Device None (Room air)   SpO2 93 %

## 2022-01-12 VITALS
BODY MASS INDEX: 42.44 KG/M2 | HEART RATE: 58 BPM | OXYGEN SATURATION: 94 % | DIASTOLIC BLOOD PRESSURE: 77 MMHG | HEIGHT: 68 IN | SYSTOLIC BLOOD PRESSURE: 144 MMHG | RESPIRATION RATE: 18 BRPM | WEIGHT: 280 LBS | TEMPERATURE: 97.5 F

## 2022-01-12 LAB
ANION GAP SERPL CALCULATED.3IONS-SCNC: 12 MMOL/L (ref 4–16)
BUN BLDV-MCNC: 24 MG/DL (ref 6–23)
CALCIUM SERPL-MCNC: 9.5 MG/DL (ref 8.3–10.6)
CHLORIDE BLD-SCNC: 100 MMOL/L (ref 99–110)
CO2: 26 MMOL/L (ref 21–32)
CREAT SERPL-MCNC: 1 MG/DL (ref 0.6–1.1)
GFR AFRICAN AMERICAN: >60 ML/MIN/1.73M2
GFR NON-AFRICAN AMERICAN: 56 ML/MIN/1.73M2
GLUCOSE BLD-MCNC: 130 MG/DL (ref 70–99)
GLUCOSE BLD-MCNC: 182 MG/DL (ref 70–99)
GLUCOSE BLD-MCNC: 206 MG/DL (ref 70–99)
HCT VFR BLD CALC: 41.8 % (ref 37–47)
HEMOGLOBIN: 12.9 GM/DL (ref 12.5–16)
MCH RBC QN AUTO: 26.8 PG (ref 27–31)
MCHC RBC AUTO-ENTMCNC: 30.9 % (ref 32–36)
MCV RBC AUTO: 86.7 FL (ref 78–100)
PDW BLD-RTO: 13.5 % (ref 11.7–14.9)
PLATELET # BLD: 312 K/CU MM (ref 140–440)
PMV BLD AUTO: 9.5 FL (ref 7.5–11.1)
POTASSIUM SERPL-SCNC: 3.6 MMOL/L (ref 3.5–5.1)
RBC # BLD: 4.82 M/CU MM (ref 4.2–5.4)
SODIUM BLD-SCNC: 138 MMOL/L (ref 135–145)
TSH HIGH SENSITIVITY: 0.31 UIU/ML (ref 0.27–4.2)
WBC # BLD: 8.2 K/CU MM (ref 4–10.5)

## 2022-01-12 PROCEDURE — 82962 GLUCOSE BLOOD TEST: CPT

## 2022-01-12 PROCEDURE — 36415 COLL VENOUS BLD VENIPUNCTURE: CPT

## 2022-01-12 PROCEDURE — 6370000000 HC RX 637 (ALT 250 FOR IP): Performed by: INTERNAL MEDICINE

## 2022-01-12 PROCEDURE — 85027 COMPLETE CBC AUTOMATED: CPT

## 2022-01-12 PROCEDURE — 80048 BASIC METABOLIC PNL TOTAL CA: CPT

## 2022-01-12 PROCEDURE — 84443 ASSAY THYROID STIM HORMONE: CPT

## 2022-01-12 PROCEDURE — 2580000003 HC RX 258: Performed by: INTERNAL MEDICINE

## 2022-01-12 PROCEDURE — 94640 AIRWAY INHALATION TREATMENT: CPT

## 2022-01-12 PROCEDURE — 94150 VITAL CAPACITY TEST: CPT

## 2022-01-12 PROCEDURE — 93005 ELECTROCARDIOGRAM TRACING: CPT | Performed by: HOSPITALIST

## 2022-01-12 PROCEDURE — 6360000002 HC RX W HCPCS: Performed by: INTERNAL MEDICINE

## 2022-01-12 RX ORDER — DEXAMETHASONE 6 MG/1
6 TABLET ORAL
Qty: 4 TABLET | Refills: 0 | Status: SHIPPED | OUTPATIENT
Start: 2022-01-12 | End: 2022-01-16

## 2022-01-12 RX ADMIN — Medication 500 MG: at 09:22

## 2022-01-12 RX ADMIN — ALBUTEROL SULFATE 2 PUFF: 90 AEROSOL, METERED RESPIRATORY (INHALATION) at 08:45

## 2022-01-12 RX ADMIN — DICYCLOMINE HYDROCHLORIDE 20 MG: 20 TABLET ORAL at 03:48

## 2022-01-12 RX ADMIN — PANTOPRAZOLE SODIUM 40 MG: 40 TABLET, DELAYED RELEASE ORAL at 06:32

## 2022-01-12 RX ADMIN — FAMOTIDINE 20 MG: 20 TABLET, FILM COATED ORAL at 09:18

## 2022-01-12 RX ADMIN — ENOXAPARIN SODIUM 30 MG: 100 INJECTION SUBCUTANEOUS at 09:20

## 2022-01-12 RX ADMIN — BARICITINIB 4 MG: 2 TABLET, FILM COATED ORAL at 09:17

## 2022-01-12 RX ADMIN — GUAIFENESIN 600 MG: 600 TABLET, EXTENDED RELEASE ORAL at 09:19

## 2022-01-12 RX ADMIN — DEXAMETHASONE 6 MG: 4 TABLET ORAL at 09:18

## 2022-01-12 RX ADMIN — BUDESONIDE AND FORMOTEROL FUMARATE DIHYDRATE 2 PUFF: 160; 4.5 AEROSOL RESPIRATORY (INHALATION) at 08:46

## 2022-01-12 RX ADMIN — THEOPHYLLINE ANHYDROUS 300 MG: 100 CAPSULE, EXTENDED RELEASE ORAL at 09:17

## 2022-01-12 RX ADMIN — SODIUM CHLORIDE, PRESERVATIVE FREE 10 ML: 5 INJECTION INTRAVENOUS at 09:21

## 2022-01-12 RX ADMIN — Medication 2000 UNITS: at 09:21

## 2022-01-12 RX ADMIN — DICYCLOMINE HYDROCHLORIDE 20 MG: 20 TABLET ORAL at 09:19

## 2022-01-12 RX ADMIN — DULOXETINE 60 MG: 30 CAPSULE, DELAYED RELEASE ORAL at 09:19

## 2022-01-12 ASSESSMENT — PAIN DESCRIPTION - LOCATION: LOCATION: BACK

## 2022-01-12 ASSESSMENT — PAIN DESCRIPTION - PAIN TYPE: TYPE: CHRONIC PAIN

## 2022-01-12 ASSESSMENT — PAIN DESCRIPTION - FREQUENCY: FREQUENCY: CONTINUOUS

## 2022-01-12 ASSESSMENT — PAIN SCALES - GENERAL: PAINLEVEL_OUTOF10: 7

## 2022-01-12 ASSESSMENT — PAIN DESCRIPTION - DESCRIPTORS: DESCRIPTORS: ACHING;NAGGING

## 2022-01-12 NOTE — DISCHARGE INSTR - COC
Continuity of Care Form    Patient Name: Rocky Bermeo   :  1960  MRN:  [id number]    Admit date:  2022  Discharge date:  ***    Code Status Order: Full Code   Advance Directives:      Admitting Physician:  Juan Alberto Marley MD  PCP: Gildardo Severino MD    Discharging Nurse: [facility] Unit/Room#: 1831/6079-L  Discharging Unit Phone Number: ***    Emergency Contact:   Extended Emergency Contact Information  Primary Emergency Contact: Darlene Collins  Address: [address] [place], [place] [address] [place] [address] Phone: [phone]  Mobile Phone: [phone]  Relation: Child  Secondary Emergency Contact: Joselito Day Phone: [phone]  Relation: Spouse    Past Surgical History:  Past Surgical History:   Procedure Laterality Date    TUBAL LIGATION         Immunization History:   Immunization History   Administered Date(s) Administered    Influenza Virus Vaccine 10/12/2017    Tdap (Boostrix, Adacel) 2015       Active Problems:  Patient Active Problem List   Diagnosis Code    Degenerative arthritis of thumb M18.10    Essential hypertension I10    Precordial pain R07.2    Type 2 diabetes mellitus without complication, without long-term current use of insulin (HCC) E11.9    Claudication ([facility]) I73.9    WD-Wound of left leg, subsequent encounter S81.802D    COPD exacerbation ([facility] [address] J44.1    Dyslipidemia E78.5    Pneumonia due to COVID-19 virus U07.1, J12.82    Acute hypoxemic respiratory failure ([facility] [address] J96.01       Isolation/Infection:   Isolation            Droplet Plus          Patient Infection Status       Infection Onset Added Last Indicated Last Indicated By Review Planned Expiration Resolved Resolved By    COVID-19 22 COVID-19, Rapid 22      Resolved    COVID-19 (Rule Out) 22 COVID-19, Rapid (Ordered)   22 Rule-Out Test Resulted    MRSA 20 Culture, Urine   22 Spencer Reese RN    COVID-19 (Rule Out) 05/15/20 05/15/20 05/15/20 Covid-19 Ambulatory (Ordered)   20 Rule-Out Test Resulted            Nurse Assessment:  Last Vital Signs: BP (!) 144/77   Pulse 58   Temp 97.5 °F (36.4 °C) (Oral)   Resp 18   Ht 5' 8\" (1.727 m)   Wt 280 lb (127 kg)   SpO2 94%   BMI 42.57 kg/m²     Last documented pain score (0-10 scale): Pain Level: 7  Last Weight:   Wt Readings from Last 1 Encounters:   22 280 lb (127 kg)     Mental Status:  {IP PT MENTAL STATUS:}    IV Access:  { SIM IV ACCESS:222415064}    Nursing Mobility/ADLs:  Walking   {CHP DME UHHD:865513866}  Transfer  {CHP DME HBDS:529881079}  Bathing  {CHP DME CTW}  Dressing  {CHP DME XHRW:105833818}  Toileting  {CHP DME CJVA:680795923}  Feeding  {CHP DME XLJB:841946875}  Med Admin  {P DME HVXS:704705293}  Med Delivery   {[facility] MED Delivery:186333182}    Wound Care Documentation and Therapy:        Elimination:  Continence: Bowel: {YES / YS:98906}  Bladder: {YES / NX:77920}  Urinary Catheter: {Urinary Catheter:220650821}   Colostomy/Ileostomy/Ileal Conduit: {YES / AP:41204}       Date of Last BM: ***  No intake or output data in the 24 hours ending 22 1208  No intake/output data recorded.     Safety Concerns:     [employer] Safety Concerns:588306895}    Impairments/Disabilities:      [employer] Impairments/Disabilities:423126380}    Nutrition Therapy:  Current Nutrition Therapy:   [employer] Diet List:513676735}    Routes of Feeding: {CHP DME Other Feedings:853132949}  Liquids: {Slp liquid thickness:11768}  Daily Fluid Restriction: {CHP DME Yes amt example:627232595}  Last Modified Barium Swallow with Video (Video Swallowing Test): {Done Not Done XROR:918627125}    Treatments at the Time of Hospital Discharge:   Respiratory Treatments: ***  Oxygen Therapy:  {Therapy; copd oxygen:50872}  Ventilator:    { [facility]:429946095}    Rehab Therapies: {THERAPEUTIC INTERVENTION:6330766674}  Weight Bearing Status/Restrictions: [address] [employer] Weight Bearin}  Other Medical Equipment (for information only, NOT a DME order):  {EQUIPMENT:137690454}  Other Treatments: ***    Patient's personal belongings (please select all that are sent with patient):  {CHP DME Belongings:767695348}    RN SIGNATURE:  {Esignature:545469203}    CASE MANAGEMENT/SOCIAL WORK SECTION    Inpatient Status Date: ***    Readmission Risk Assessment Score:  Readmission Risk              Risk of Unplanned Readmission:  12           Discharging to Facility/ Agency   Name:   Address:  Phone:  Fax:    Dialysis Facility (if applicable)   Name:  Address:  Dialysis Schedule:  Phone:  Fax:    / signature: {Esignature:718227126}    PHYSICIAN SECTION    Prognosis: Good    Condition at Discharge: Stable    Rehab Potential (if transferring to Rehab): Good    Recommended Labs or Other Treatments After Discharge: Follow-up with PCP in 1 week. Outpatient follow-up with PCP as for outpatient Holter monitor she continued to have sinus bradycardia    Physician Certification: I certify the above information and transfer of Doreen Palencia  is necessary for the continuing treatment of the diagnosis listed and that she requires [address] for less 30 days.    Update Admission H&P: No change in H&P    PHYSICIAN SIGNATURE:  Electronically signed by Jesusita Dodson MD on 22 at 12:08 PM EST

## 2022-01-12 NOTE — DISCHARGE SUMMARY
Hospital Medicine Discharge Summary      Patient Identification:   Rose Maciel   : 1960  MRN: [id number]   Account: [de-identified]      Patient's PCP: Tawnya Chambers MD    Admit Date: 2022     Discharge Date:    2022    Admitting Physician: Mario Burns MD     Discharge Physician: Merle Zambrano MD     Consults:     IP CONSULT TO HOSPITALIST  IP CONSULT TO PHARMACY  IP CONSULT TO HOME CARE NEEDS    Disposition: Home with home health care    Condition at Discharge: Stable    Code Status:  Full Code       Discharge Diagnoses:  Acute metabolic encephalopathy, resolved  COVID-19 with acute hypoxic respiratory failure  Diabetes mellitus type 2 with obesity  Essential hypertension  COPD without exacerbation  Sinus bradycardia          Hospital Course:   Rose Maciel is a 64 y.o. female hospitalized   2022 for COVID-pneumonia, acute respiratory failure, patient treated with respiratory care protocol, IV steroid, respiratory status improved, she was weaned off oxygen. Patient discharged home to follow complete dexamethasone for 10 days. Patient noted to have mild sinus bradycardia without symptoms, TSH was within normal limit. Heart rate in the high 50s on discharge patient to follow-up with PCP as an outpatient with consideration for event monitor as an outpatient if symptoms arise. She is to continue her current blood pressure medications (Lotrel) she was instructed to wear her CPAP at night as sleep apnea may contribute to sinus bradycardia. She verbalized understanding and agreed to the plan. Patient seen and examined in the day of discharge. Vital signs reviewed.    BP (!) 144/77   Pulse 58   Temp 97.5 °F (36.4 °C) (Oral)   Resp 18   Ht 5' 8\" (1.727 m)   Wt 280 lb (127 kg)   SpO2 94%   BMI 42.57 kg/m²     Patient alert, oriented x4, no acute distress, breathing comfortably, good air entry in both lungs, normal first and second heart sound, heart rate 58, regular no lower extremity edema. *. Patient reached the maximum benefit of this hospitalization. Patient  was hemodynamically stable on discharge   Available consultant are in agreement with discharge planning. Patient symptoms was controlled and in agreement with discharge planning. Patient Instructions:    Discharge lab/important testing/finding that need follow up : Outpatient follow-up with PCP in 1 week. Activity: activity as tolerated    Diet: ADULT DIET; Regular; 4 carb choices (60 gm/meal)  ADULT ORAL NUTRITION SUPPLEMENT; Breakfast, Lunch, Dinner; Low Calorie/High Protein Oral Supplement      Follow-up visits:   [place] [address] [address]  [phone]             Discharge Medications:       Current Discharge Medication List      START taking these medications    Details   dexamethasone (DECADRON) 6 MG tablet Take 1 tablet by mouth daily (with breakfast) for 4 doses  Qty: 4 tablet, Refills: 0           Current Discharge Medication List        Current Discharge Medication List      CONTINUE these medications which have NOT CHANGED    Details   Ascorbic Acid (VITAMIN C) 250 MG tablet Take 1,000 mg by mouth daily      Cholecalciferol (VITAMIN D3) 50 MCG (2000 UT) CAPS Take 1 capsule by mouth      amLODIPine-benazepril (LOTREL) 10-40 MG per capsule Take 1 capsule by mouth daily  Qty: 90 capsule, Refills: 3    Comments: Patient needs to schedule appointment before any more refills will be given  Associated Diagnoses: Essential hypertension      acetaminophen (APAP EXTRA STRENGTH) 500 MG tablet Take 1 tablet by mouth every 6 hours as needed for Pain  Qty: 20 tablet, Refills: 0      bacitracin-neomycin-polymyxin b-hydrocortisone 1 % ointment Apply topically 2 times daily.   Qty: 1 Tube, Refills: 0      ondansetron (ZOFRAN ODT) 4 MG disintegrating tablet Take 1 tablet by mouth every 8 hours as needed for Nausea  Qty: 15 tablet, Refills: 0      insulin lispro (HUMALOG) 100 UNIT/ML injection vial Inject 30 Units into the skin 3 times daily (before meals)  Qty: 1 vial, Refills: 3      guaiFENesin (MUCINEX) 600 MG extended release tablet Take 1 tablet by mouth 2 times daily  Qty: 20 tablet, Refills: 0      mometasone-formoterol (DULERA) 100-5 MCG/ACT inhaler Inhale 2 puffs into the lungs 2 times daily  Qty: 13 g, Refills: 3      albuterol (PROVENTIL) (2.5 MG/3ML) 0.083% nebulizer solution Refills: 0      dicyclomine (BENTYL) 20 MG tablet Take 20 mg by mouth every 6 hours      VENTOLIN  (90 Base) MCG/ACT inhaler Refills: 0      theophylline (THEODUR) 300 MG extended release tablet 1 tablet daily  Refills: 0      furosemide (LASIX) 20 MG tablet take 1/2 tablet every few weeks  Refills: 0      DULoxetine (CYMBALTA) 60 MG capsule Take 60 mg by mouth daily. insulin glargine (LANTUS) 100 UNIT/ML injection Inject 50 Units into the skin nightly       RA ALCOHOL SWABS 70 % PADS use as directed three times a day  Refills: 0      Pseudoephedrine-DM-GG 30- MG CAPS Take 1 tablet by mouth 2 times daily as needed (cough or congestion)  Qty: 10 capsule, Refills: 0      pravastatin (PRAVACHOL) 20 MG tablet take 1 tablet by mouth once daily  Qty: 30 tablet, Refills: 5      linagliptin (TRADJENTA) 5 MG tablet Take 1 tablet by mouth daily  Qty: 30 tablet, Refills: 3      metFORMIN (GLUCOPHAGE) 850 MG tablet Take 1 tablet by mouth 2 times daily (with meals)  Qty: 60 tablet, Refills: 3      LUMIGAN 0.01 % SOLN ophthalmic drops Place 1 drop into both eyes daily   Refills: 1      pantoprazole (PROTONIX) 40 MG tablet Take 40 mg by mouth daily      Misc. Devices (CANE) MISC 1 Device by Does not apply route daily as needed.   Qty: 1 each, Refills: 0           Current Discharge Medication List      STOP taking these medications       simvastatin (ZOCOR) 20 MG tablet Comments:   Reason for Stopping:         naproxen (NAPROSYN) 500 MG tablet Comments:   Reason for Stopping: ibuprofen (ADVIL;MOTRIN) 600 MG tablet Comments:   Reason for Stopping:         divalproex (DEPAKOTE) 250 MG DR tablet Comments:   Reason for Stopping:         LYRICA 75 MG capsule Comments:   Reason for Stopping:         ibuprofen (ADVIL;MOTRIN) 600 MG tablet Comments:   Reason for Stopping:                 Labs: For convenience and continuity at follow-up the following most recent labs are provided:      CBC:    Lab Results   Component Value Date    WBC 8.2 01/12/2022    HGB 12.9 01/12/2022    HCT 41.8 01/12/2022     01/12/2022       Renal:    Lab Results   Component Value Date     01/12/2022    K 3.6 01/12/2022     01/12/2022    CO2 26 01/12/2022    BUN 24 01/12/2022    CREATININE 1.0 01/12/2022    CALCIUM 9.5 01/12/2022         Significant Diagnostic Studies    Radiology:   CT ABDOMEN PELVIS W IV CONTRAST Additional Contrast? None   Final Result   Limited exam as portions of the left abdomen were excluded from the field of   view. No gross acute abnormalities are identified. CTA PULMONARY W CONTRAST   Final Result   Findings most consistent with multifocal pneumonia. RECOMMENDATIONS:   Unavailable         XR CHEST PORTABLE   Final Result   Ill-defined bilateral peripheral opacities, most concerning for COVID   pneumonia. Small bilateral pleural effusions. Time Spent on discharge is 35 in the examination, evaluation, counseling and review of medications and discharge plan. Thank you Angy Blackman MD for the opportunity to be involved in this patient's care.          Electronically signed by Manuel Hylton MD on 1/12/2022 at 2:27 PM

## 2022-01-13 ENCOUNTER — CARE COORDINATION (OUTPATIENT)
Dept: CASE MANAGEMENT | Age: 62
End: 2022-01-13

## 2022-01-13 NOTE — CARE COORDINATION
[facility] Transitions Initial Follow Up Call    Call within 2 business days of discharge: Yes    Patient: David Amor Patient : 1960   MRN: <[id number]>  Reason for Admission: PNA d/t COVID  Discharge Date: 22 RARS: Readmission Risk Score: 11.8 ( )      Last Discharge [facility]       Complaint Diagnosis Description Type Department Provider    22 Shortness of Breath Acute respiratory failure due to COVID-19 [facility]) . .. ED to Hosp-Admission (Discharged) (ADMITTED) Pedro Jordan MD; Geoff Humphrey. .. Facility: [facility]     1st attempt to contact patient for initial COVID transition follow up. Unable to reach patient. Left message with contact information and request for call back. No one listed on HIPAA. CTN contacted [address] [place] [address]. Spoke with Dora Rubin who confirmed they received resumption of care orders. Someone will contact patient to set up visit.         Follow Up  Future Appointments   Date Time Provider Angelina Burgos   2022  1:00 PM JOSE Jones - CNP [facility]   2022  1:30 PM Matthew Avelar MD [facility] MADISON Valdivia RN

## 2022-01-14 ENCOUNTER — CARE COORDINATION (OUTPATIENT)
Dept: CASE MANAGEMENT | Age: 62
End: 2022-01-14

## 2022-01-14 LAB
EKG ATRIAL RATE: 58 BPM
EKG DIAGNOSIS: NORMAL
EKG P AXIS: 11 DEGREES
EKG P-R INTERVAL: 158 MS
EKG Q-T INTERVAL: 434 MS
EKG QRS DURATION: 86 MS
EKG QTC CALCULATION (BAZETT): 426 MS
EKG R AXIS: -7 DEGREES
EKG T AXIS: 13 DEGREES
EKG VENTRICULAR RATE: 58 BPM

## 2022-01-14 PROCEDURE — 93010 ELECTROCARDIOGRAM REPORT: CPT | Performed by: INTERNAL MEDICINE

## 2022-01-14 NOTE — CARE COORDINATION
Transitions of Care Call  Call within 2 business days of discharge: Yes    Patient: Rocky Fraction Patient : 1960   MRN: <[id number]>  Reason for Admission:  COVID -19  Discharge Date: 22 RARS: Readmission Risk Score: 11.8 ( )      Last Discharge [facility]       Complaint Diagnosis Description Type Department Provider    22 Shortness of Breath Acute respiratory failure due to COVID-19 [facility]) . .. ED to Hosp-Admission (Discharged) (ADMITTED) Yudi Hodges MD; Yuliana Avelar. .. Was this an external facility discharge? No Discharge Facility:  [place]    Challenges to be reviewed by the provider   Additional needs identified to be addressed with provider: No  none                 Encounter was not routed to provider for escalation. Method of communication with provider: none. Discussed COVID-19 related testing which was: available at this time. Test results were: positive. Patient informed of results, if available? Yes. Current Symptoms: fatigue    Reviewed New or Changed Meds: yes    Do you have what you need at home?  Durable Medical Equipment ordered at discharge: None   Home Health/Outpatient orders at discharge: home health care   Was patient discharged with a pulse oximeter? No Discussed and confirmed pulse oximeter discharge instructions and when to notify provider or seek emergency care. Patient education provided: Reviewed appropriate site of care based on symptoms and resources available to patient including: PCP.      Follow up appointment scheduled within 7 days of discharge: no. If no appointment scheduled, scheduling offered: no  Future Appointments   Date Time Provider Angelina Burgos   2022  1:00 PM Michael Salcedo APRN - CNP [facility]   2022  1:30 PM Maritza Osullivan MD [facility]       Interventions: Obtained and reviewed discharge summary and/or continuity of care documents  Education of patient/family/caregiver/guardian to support self-management-making PCP follow up appt  Assessment and support for treatment adherence and medication management-pt is taking new medication as directed  Reviewed discharge instructions, medical action plan and red flags with patient who verbalized understanding. Plan for follow-up call in 5-7 days based on severity of symptoms and risk factors. Plan for next call: symptom management-fatigue  self management-pt is taking new medicine  follow up appointment-no PCP appt declined assistance  community resources-Home Care was at house during our call and she has aide services that will help her make PCP f/u appt. Spoke with Nitesh Cheung, very difficult to hear her, children yelling into the phone during [address] [employer] interview. Pt reports just feeling tired. NO SPO2 monitor, no oxygen. She does not have PCP f/u appt offered; declined. [facility] aide will make it for her. Advised to make for 1-2 weeks post hospital discharge. Has Decadron. Taking before breakfast.    Provided contact information for future needs.     Pinky Cameron RN

## 2022-01-19 ENCOUNTER — CARE COORDINATION (OUTPATIENT)
Dept: CASE MANAGEMENT | Age: 62
End: 2022-01-19

## 2022-01-19 NOTE — CARE COORDINATION
[facility] Transitions Follow Up Call    2022    Patient: Terrell Padilla  Patient : 1960   MRN: <[id number]>  Reason for Admission: COVID  Discharge Date: 22 RARS: Readmission Risk Score: 11.8 ( )    Attempted to contact patient for COVID transition follow up. Unable to reach patient. Left message with contact information and request for call back. No one listed on HIPAA.       Follow Up  Future Appointments   Date Time Provider Angelina Burgos   2022  1:30 PM Jamarcus Katz MD [facility]   3/7/2022 10:20 AM Edtylerio C C/ Piyush Asencio. [facility], APRN - CNP [facility]       Colin Childress RN

## 2022-01-26 ENCOUNTER — CARE COORDINATION (OUTPATIENT)
Dept: CASE MANAGEMENT | Age: 62
End: 2022-01-26

## 2022-01-26 NOTE — CARE COORDINATION
[facility] Transitions Follow Up Call    2022    Patient: Doreen Palencia  Patient : 1960   MRN: <[id number]>  Reason for Admission: COVID  Discharge Date: 22 RARS: Readmission Risk Score: 11.8 ( )         Spoke with: Teresa Banuelos, patient    Contacted patient for COVID transition follow up. Brooke Lua states that she is doing okay. Reports breathing is back at baseline. Denies distress. Does not use oxygen but does have a CPAP which she uses at night only. She denies having any c/o chest pain/discomfort, pressure, tightness, cough, fever, chills. Reports she had an episode of vomiting last night but none since. She does have nausea at times. Advised to contact her PCP if nausea/vomiting continue. Also discussed other signs/symptoms and when to contact provider. She verbalized understanding. Appetite has gotten better and she is eating more. Encouraged to drink plenty of fluids. She reports having a follow up with PCP on Monday, 22. She does not have any questions or concerns at this time. No further outreach. Follow Up Call    Challenges to be reviewed by the provider   Additional needs identified to be addressed with provider: No  none                 Encounter was not routed to provider for escalation. Method of communication with provider:  none. Contacted the patient by telephone to follow up after hospital admission. Status: improved  Interventions to address identified needs: Education of patient/family/caregiver/guardian to support self-management-signs/symptoms and when to contact provider    [facility] follow up appointment(s):   Future Appointments   Date Time Provider Angelina Burgos   2022  1:30 PM Josselin Corona MD [facility]   3/7/2022 10:20 AM Adams VALLEJO/ Piyush Asencio. [facility], APRN - CNP [facility]     Follow up appointment completed? Yes. Provided contact information for future needs. No further follow-up call indicated based on severity of symptoms and risk factors. Care Transitions Subsequent and Final Call    Subsequent and Final Calls  Do you have any ongoing symptoms?: Yes  Patient-reported symptoms: Nausea, Shortness of Breath  Have your medications changed?: No  Do you have any questions related to your medications?: No  Do you have any needs or concerns that I can assist you with?: No  Care Transitions Interventions  Other Interventions:            Follow Up  Future Appointments   Date Time Provider Angelina Burgos   2/14/2022  1:30 PM Jose Manuel Schilling MD [facility] [place]   3/7/2022 10:20 AM JOSE Bryan - CNP [facility]       Ratna Elizabeth RN

## 2022-02-21 ENCOUNTER — HOSPITAL ENCOUNTER (EMERGENCY)
Age: 62
Discharge: HOME | End: 2022-02-21
Payer: MEDICAID

## 2022-02-21 VITALS
OXYGEN SATURATION: 95 % | RESPIRATION RATE: 18 BRPM | HEART RATE: 78 BPM | DIASTOLIC BLOOD PRESSURE: 80 MMHG | SYSTOLIC BLOOD PRESSURE: 146 MMHG | TEMPERATURE: 98.2 F | HEIGHT: 68 IN | WEIGHT: 278 LBS | BODY MASS INDEX: 42.13 KG/M2

## 2022-02-21 DIAGNOSIS — M10.9 ACUTE GOUT OF RIGHT FOOT, UNSPECIFIED CAUSE: Primary | ICD-10-CM

## 2022-02-21 DIAGNOSIS — M10.9 ACUTE GOUT OF RIGHT ANKLE, UNSPECIFIED CAUSE: ICD-10-CM

## 2022-02-21 PROCEDURE — 99284 EMERGENCY DEPT VISIT MOD MDM: CPT

## 2022-02-21 PROCEDURE — 6370000000 HC RX 637 (ALT 250 FOR IP): Performed by: PHYSICIAN ASSISTANT

## 2022-02-21 RX ORDER — INDOMETHACIN 50 MG/1
50 CAPSULE ORAL 2 TIMES DAILY WITH MEALS
Qty: 20 CAPSULE | Refills: 0 | Status: SHIPPED | OUTPATIENT
Start: 2022-02-21 | End: 2022-05-10

## 2022-02-21 RX ORDER — INDOMETHACIN 25 MG/1
25 CAPSULE ORAL ONCE
Status: DISCONTINUED | OUTPATIENT
Start: 2022-02-21 | End: 2022-02-21

## 2022-02-21 RX ORDER — INDOMETHACIN 25 MG/1
50 CAPSULE ORAL ONCE
Status: COMPLETED | OUTPATIENT
Start: 2022-02-21 | End: 2022-02-21

## 2022-02-21 RX ADMIN — INDOMETHACIN 50 MG: 25 CAPSULE ORAL at 10:56

## 2022-02-21 ASSESSMENT — PAIN SCALES - GENERAL
PAINLEVEL_OUTOF10: 8
PAINLEVEL_OUTOF10: 8

## 2022-02-21 ASSESSMENT — PAIN - FUNCTIONAL ASSESSMENT: PAIN_FUNCTIONAL_ASSESSMENT: 0-10

## 2022-02-21 ASSESSMENT — PAIN DESCRIPTION - FREQUENCY: FREQUENCY: CONTINUOUS

## 2022-02-21 NOTE — ED NOTES
Called [facility] pharmacist to verify indomethacin, comes from pharmacy, will medicate when received.       Snehal Chen, RN  02/21/22 6553

## 2022-02-21 NOTE — ED PROVIDER NOTES
Patient Identification  Rocky Bermeo is a 64 y.o. female    Chief Complaint  Foot Pain (right, hx of gout)      HPI  (History provided by patient)  This is a 64 y.o. female who was brought in by self for chief complaint of foot pain. Onset was 2 days ago. Pain located in R great toe, right mid foot, and right ankle. Began spontaneously. Feels like previous episodes of gout. No fevers. Tried tylenol without relief. Pain is 8/10, constant, dull. REVIEW OF SYSTEMS    Constitutional:  Denies fever, chills  Musculoskeletal:  Denies back pain.  + foot/ankle pain  Skin:  Denies rash  Neurologic:  Denies focal weakness, or sensory changes     See HPI and nursing notes for additional information     I have reviewed the following nursing documentation:  Allergies: Allergies   Allergen Reactions    Codeine     Adhesive Tape Itching    Toradol [Ketorolac Tromethamine] Other (See Comments)     Headache for 5-6 days       Past medical history:  has a past medical history of Arthritis, Asthma, Bipolar 1 disorder ([facility] [address] COPD (chronic obstructive pulmonary disease) ([facility] [address] Depression, Diabetes mellitus ([facility] [address] Edema, Glaucoma, History of Doppler ultrasound (03/26/2018), History of nuclear stress test (02/22/2018), Doppler echocardiogram (02/22/2018), Hyperlipidemia, Hypertension, and IBS (irritable bowel syndrome). Past surgical history:  has a past surgical history that includes Tubal ligation. Home medications:   Prior to Admission medications    Medication Sig Start Date End Date Taking?  Authorizing Provider   indomethacin (INDOCIN) 50 MG capsule Take 1 capsule by mouth 2 times daily (with meals) for 10 days 2/21/22 3/3/22 Yes Jarett Troy PA-C   Ascorbic Acid (VITAMIN C) 250 MG tablet Take 1,000 mg by mouth daily    Historical Provider, MD   Cholecalciferol (VITAMIN D3) 50 MCG (2000 UT) CAPS Take 1 capsule by mouth    Historical Provider, MD   amLODIPine-benazepril (LOTREL) 10-40 MG per capsule Take 1 capsule by mouth daily 5/3/21   JOSE Greene CNP   acetaminophen (APAP EXTRA STRENGTH) 500 MG tablet Take 1 tablet by mouth every 6 hours as needed for Pain 4/14/21   Zachary Eastman PA-C   bacitracin-neomycin-polymyxin b-hydrocortisone 1 % ointment Apply topically 2 times daily.  3/3/21   4951 Trevin Pham PA-C   ondansetron (ZOFRAN ODT) 4 MG disintegrating tablet Take 1 tablet by mouth every 8 hours as needed for Nausea 12/22/19   Rivka Guthrie DO RA ALCOHOL SWABS 70 % PADS use as directed three times a day 9/24/19   Historical Provider, MD   Pseudoephedrine-DM-GG 30- MG CAPS Take 1 tablet by mouth 2 times daily as needed (cough or congestion) 9/29/19   Yasmeen Quevedo MD   pravastatin (PRAVACHOL) 20 MG tablet take 1 tablet by mouth once daily 5/24/19   JOSE Cedillo CNP   linagliptin (TRADJENTA) 5 MG tablet Take 1 tablet by mouth daily 2/27/19   Mirian Ann MD   metFORMIN (GLUCOPHAGE) 850 MG tablet Take 1 tablet by mouth 2 times daily (with meals) 2/26/19   Mirian Ann MD   insulin lispro (HUMALOG) 100 UNIT/ML injection vial Inject 30 Units into the skin 3 times daily (before meals) 2/26/19   Mirian Ann MD   guaiFENesin [facility]) 600 MG extended release tablet Take 1 tablet by mouth 2 times daily 2/26/19   Mirian Ann MD   mometasone-formoterol [facility]) 100-5 MCG/ACT inhaler Inhale 2 puffs into the lungs 2 times daily 2/26/19   Mirian Ann MD   albuterol (PROVENTIL) (2.5 MG/3ML) 0.083% nebulizer solution  2/21/19   Historical Provider, MD   dicyclomine (BENTYL) 20 MG tablet Take 20 mg by mouth every 6 hours    Historical Provider, MD   VENTOLIN  (90 Base) MCG/ACT inhaler  2/27/18   Historical Provider, MD   LUMIGAN 0.01 % SOLN ophthalmic drops Place 1 drop into both eyes daily  1/22/18   Historical Provider, MD   theophylline (THEODUR) 300 MG extended release tablet 1 tablet daily 2/11/18   Historical Provider, MD   pantoprazole (PROTONIX) 40 MG tablet Take 40 mg by mouth daily    Historical Provider, MD   furosemide (LASIX) 20 MG tablet take 1/2 tablet every few weeks 1/16/18   Historical Provider, MD   Misc. Devices (CANE) MISC 1 Device by Does not apply route daily as needed. 4/1/14   Jamee Lloyd PA-C   DULoxetine (CYMBALTA) 60 MG capsule Take 60 mg by mouth daily. Historical Provider, MD   insulin glargine (LANTUS) 100 UNIT/ML injection Inject 50 Units into the skin nightly     Historical Provider, MD       Social history:  reports that she has quit smoking. She has never used smokeless tobacco. She reports that she does not drink alcohol and does not use drugs. Family history:    Family History   Problem Relation Age of Onset   Jacinda Baxter Cancer Mother     Diabetes Mother     Heart Disease Mother     Cancer Father     High Blood Pressure Father     Cancer Other     Cancer Other     Diabetes Sister     Heart Disease Sister     High Blood Pressure Brother     Depression Sister          Exam  BP (!) 159/83   Pulse 75   Temp 98.2 °F (36.8 °C) (Oral)   Resp 18   Ht 5' 8\" (1.727 m)   Wt 278 lb (126.1 kg)   SpO2 98%   BMI 42.27 kg/m²   Nursing note and vitals reviewed. Constitutional: Well developed, well nourished. No acute distress. HENT:      Head: Normocephalic and atraumatic. Ears: External ears normal.      Nose: Nose normal.  Cardiovascular: DP and PT pulses 2+ bilaterally. Pulmonary/Chest: Effort normal. No respiratory distress. Musculoskeletal: Moves all extremities. No gross deformity. TTP over 1st toe, 1st MTP joint, right dorsal mid foot, and lateral right ankle. There is mild edema over lateral ankle and mild erythema and warmth over lateral ankle and 1st MTP joint. Remainder of RLE nontender. Neurological: Alert and oriented to person, place, and time. Motor and sensation grossly intact. Normal muscle tone. Skin: Warm and dry. No rash. Good capillary refill noted in right toes.     Psychiatric: Normal mood and affect. Behavior is normal.    Procedures        Radiographs (if obtained):  [] The following radiograph was interpreted by myself in the absence of a radiologist:   [x] Radiologist's Report Reviewed:  No orders to display          Labs  No results found for this visit on 02/21/22. MDM  Patient presents today with chief complaint of foot/ankle pain. Physical exam revealed TTP of r foot and ankle, mild redness and warmth. Review of records shows that patient has presented several times for this over the past several years. She reports this feels exactly the same. She ran out of her home indomethacin which typically helps with this. Her vital signs are unremarkable. She has no confluent areas of erythema concerning for cellulitis. She is able to move the ankle and toes although there is some mild pain at this time doubt joint infection or osteomyelitis. Will start on indomethacin given her previous success with this treatment. I estimate there is LOW risk for COMPARTMENT SYNDROME, DEEP VENOUS THROMBOSIS, NECROTIZING FASCIITIS, CELLULITIS, MAJOR FRACTURE, OSTEOMYELITIS, SEPTIC ARTHRITIS, TENDON OR NEUROVASCULAR INJURY, thus I consider the discharge disposition reasonable. Terrell Padilla and I have discussed the diagnosis and risks, and we agree with discharging home to follow-up with their primary doctor or the referral orthopedist. We also discussed returning to the Emergency Department immediately if new or worsening symptoms occur. We have discussed the symptoms which are most concerning (e.g., changing or worsening pain, numbness, weakness, fever, new rash, discoloration, new or worsening swelling) that necessitate immediate return. I have independently evaluated this patient. Final Impression  1. Acute gout of right foot, unspecified cause    2.  Acute gout of right ankle, unspecified cause        Blood pressure (!) 159/83, pulse 75, temperature 98.2 °F (36.8 °C), temperature source Oral, resp. rate 18, height 5' 8\" (1.727 m), weight 278 lb (126.1 kg), SpO2 98 %, not currently breastfeeding. Disposition:  Discharge to home in stable condition. Patient was given scripts for the following medications. I counseled patient how to take these medications. New Prescriptions    INDOMETHACIN (INDOCIN) 50 MG CAPSULE    Take 1 capsule by mouth 2 times daily (with meals) for 10 days       [unfilled]    This chart was generated using the [address] [place] [address] [address]  [employer] system. I created this record but it may contain dictation errors given the limitations of this technology.        Angel Luis Cheung PA-C  02/21/22 1264

## 2022-03-07 ENCOUNTER — OFFICE VISIT (OUTPATIENT)
Dept: CARDIOLOGY CLINIC | Age: 62
End: 2022-03-07
Payer: MEDICAID

## 2022-03-07 VITALS
WEIGHT: 275.6 LBS | BODY MASS INDEX: 41.77 KG/M2 | HEART RATE: 79 BPM | DIASTOLIC BLOOD PRESSURE: 72 MMHG | HEIGHT: 68 IN | SYSTOLIC BLOOD PRESSURE: 122 MMHG | OXYGEN SATURATION: 97 %

## 2022-03-07 DIAGNOSIS — I73.9 CLAUDICATION (HCC): Primary | ICD-10-CM

## 2022-03-07 DIAGNOSIS — E78.5 DYSLIPIDEMIA: ICD-10-CM

## 2022-03-07 PROCEDURE — 36415 COLL VENOUS BLD VENIPUNCTURE: CPT | Performed by: NURSE PRACTITIONER

## 2022-03-07 PROCEDURE — 99214 OFFICE O/P EST MOD 30 MIN: CPT | Performed by: NURSE PRACTITIONER

## 2022-03-07 RX ORDER — SIMVASTATIN 20 MG
20 TABLET ORAL NIGHTLY
Qty: 90 TABLET | Refills: 3 | Status: SHIPPED | OUTPATIENT
Start: 2022-03-07 | End: 2022-03-11

## 2022-03-07 RX ORDER — DULAGLUTIDE 1.5 MG/.5ML
1.5 INJECTION, SOLUTION SUBCUTANEOUS WEEKLY
COMMUNITY

## 2022-03-07 RX ORDER — SIMVASTATIN 20 MG
20 TABLET ORAL NIGHTLY
COMMUNITY
End: 2022-03-07 | Stop reason: SDUPTHER

## 2022-03-07 ASSESSMENT — ENCOUNTER SYMPTOMS
SHORTNESS OF BREATH: 1
COUGH: 0

## 2022-03-07 NOTE — PROGRESS NOTES
[address]  Phone: [phone]    Fax [phone]    Rubina Asencio MD, Elisa Ganser, MD, [address], MD, Chloé Joseph, MD Joselito Smith MD Melina Drivers, MD Melyssa Sorensen, JOSE Bridges, JOSE Mcelroy, JOSE Friend, APRANTON    CARDIOLOGY  NOTE    3/7/2022    Almita Wolfe (:  1960) is a 64 y.o. female,Established patient, here for evaluation of the following chief complaint(s):  6 Month Follow-Up (pt denies new cardiac sx. pt does not smoke or drink. pt has no future procedures. )        SUBJECTIVE/OBJECTIVE:    Patient is here to follow up on her HTN, dyslipidemia, and chest pain. Patient ws recently in the ED for chest pain. Patient thought she had pulled a muscle in her Left side/ chest. She reports that the pain does return intermittently, both at rest and with activity. The pain is not consistent and has not worsened. She has not taken anything to alleviate the pain and she states that the pain does not last more than 5 minutes. Patient feels that her shortness of breath is normal for her from her COPD. She does not exercise. She denies issues getting or taking her medications. Denies palpations, dizziness, orthopnea, swelling of the lower legs, or syncope. Review of Systems   Constitutional: Negative for fatigue and fever. Respiratory: Positive for shortness of breath. Negative for cough. Cardiovascular: Positive for chest pain. Negative for palpitations and leg swelling. Musculoskeletal: Positive for gait problem (uses a cane). Negative for arthralgias. Neurological: Negative for dizziness, syncope, weakness, light-headedness and headaches.        Vitals:    22 1039   BP: 122/72   Pulse: 79   SpO2: 97%   Weight: 275 lb 9.6 oz (125 kg)   Height: 5' 8\" (1.727 m)       Wt Readings from Last 3 Encounters:   22 275 lb 9.6 oz (125 kg)   02/21/22 278 lb (126.1 kg)   02/14/22 280 lb (127 kg)       BP Readings from Last 3 Encounters:   03/07/22 122/72   02/21/22 (!) 146/80   01/12/22 (!) 144/77       Prior to Admission medications    Medication Sig Start Date End Date Taking? Authorizing Provider   simvastatin (ZOCOR) 20 MG tablet Take 20 mg by mouth nightly   Yes Historical Provider, MD   Dulaglutide (TRULICITY) 1.5 KL/9.6DC SOPN Inject 1.5 mg into the skin once a week   Yes Historical Provider, MD   tiotropium (SPIRIVA RESPIMAT) 2.5 MCG/ACT AERS inhaler Inhale 2 puffs into the lungs daily   Yes Historical Provider, MD   indomethacin (INDOCIN) 50 MG capsule Take 1 capsule by mouth 2 times daily (with meals) for 10 days 2/21/22 3/7/22 Yes Jarett Troy PA-C   Ascorbic Acid (VITAMIN C) 250 MG tablet Take 1,000 mg by mouth daily   Yes Historical Provider, MD   Cholecalciferol (VITAMIN D3) 50 MCG (2000 UT) CAPS Take 1 capsule by mouth   Yes Historical Provider, MD   amLODIPine-benazepril (LOTREL) 10-40 MG per capsule Take 1 capsule by mouth daily 5/3/21  Yes JOSE Correia CNP   acetaminophen (APAP EXTRA STRENGTH) 500 MG tablet Take 1 tablet by mouth every 6 hours as needed for Pain 4/14/21  Yes Baron Cherie PA-C   bacitracin-neomycin-polymyxin b-hydrocortisone 1 % ointment Apply topically 2 times daily.  3/3/21  Yes Efren Rucker PA-C RA ALCOHOL SWABS 70 % PADS use as directed three times a day 9/24/19  Yes Historical Provider, MD   pravastatin (PRAVACHOL) 20 MG tablet take 1 tablet by mouth once daily 5/24/19  Yes JOSE Benoit CNP   metFORMIN (GLUCOPHAGE) 850 MG tablet Take 1 tablet by mouth 2 times daily (with meals) 2/26/19  Yes Vadim Morgan MD   insulin lispro (HUMALOG) 100 UNIT/ML injection vial Inject 30 Units into the skin 3 times daily (before meals) 2/26/19  Yes Vadim Morgan MD   albuterol (PROVENTIL) (2.5 MG/3ML) 0.083% nebulizer solution  2/21/19  Yes Historical Provider, MD dicyclomine (BENTYL) 20 MG tablet Take 20 mg by mouth every 6 hours   Yes Historical Provider, MD   VENTOLIN  (90 Base) MCG/ACT inhaler  2/27/18  Yes Historical Provider, MD BERMEO 0.01 % SOLN ophthalmic drops Place 1 drop into both eyes daily  1/22/18  Yes Historical Provider, MD   pantoprazole (PROTONIX) 40 MG tablet Take 40 mg by mouth daily   Yes Historical Provider, MD   furosemide (LASIX) 20 MG tablet take 1/2 tablet every few weeks 1/16/18  Yes Historical Provider, MD   Misc. Devices (CANE) MISC 1 Device by Does not apply route daily as needed. 4/1/14  Yes Jamee Lloyd PA-C   DULoxetine (CYMBALTA) 60 MG capsule Take 60 mg by mouth daily. Yes Historical Provider, MD   insulin glargine (LANTUS) 100 UNIT/ML injection Inject 50 Units into the skin nightly    Yes Historical Provider, MD   ondansetron (ZOFRAN ODT) 4 MG disintegrating tablet Take 1 tablet by mouth every 8 hours as needed for Nausea  Patient not taking: Reported on 3/7/2022 12/22/19   Katarzyna Harper DO   Pseudoephedrine-DM-GG 30- MG CAPS Take 1 tablet by mouth 2 times daily as needed (cough or congestion)  Patient not taking: Reported on 3/7/2022 9/29/19   Krista Davies MD   linagliptin (TRADJENTA) 5 MG tablet Take 1 tablet by mouth daily  Patient not taking: Reported on 3/7/2022 2/27/19   Ashok Delaney MD   guaiFENesin [facility]) 600 MG extended release tablet Take 1 tablet by mouth 2 times daily  Patient not taking: Reported on 3/7/2022 2/26/19   Ashok Delaney MD   mometasone-formoterol [facility]) 100-5 MCG/ACT inhaler Inhale 2 puffs into the lungs 2 times daily  Patient not taking: Reported on 3/7/2022 2/26/19   Ashok Delaney MD   theophylline (THEODUR) 300 MG extended release tablet 1 tablet daily  Patient not taking: Reported on 3/7/2022 2/11/18   Historical Provider, MD       Physical Exam  Vitals reviewed. Constitutional:       General: She is not in acute distress. Appearance: Normal appearance.  She is obese. She is not ill-appearing. HENT:      Head: Atraumatic. Neck:      Vascular: No carotid bruit. Cardiovascular:      Rate and Rhythm: Normal rate and regular rhythm. Pulses: Normal pulses. Heart sounds: Normal heart sounds. No murmur heard. Pulmonary:      Effort: Pulmonary effort is normal. No respiratory distress. Breath sounds: Normal breath sounds. Musculoskeletal:         General: No swelling or deformity. Cervical back: Neck supple. No muscular tenderness. Neurological:      Mental Status: She is alert. Health Maintenance   Topic Date Due    Hepatitis C screen  Never done    COVID-19 Vaccine (1) Never done    Pneumococcal 0-64 years Vaccine (1 of 2 - PPSV23) Never done    Diabetic foot exam  Never done    Depression Screen  Never done    HIV screen  Never done    Diabetic retinal exam  Never done    Cervical cancer screen  Never done    Colorectal Cancer Screen  Never done    Shingles Vaccine (1 of 2) Never done    Diabetic microalbuminuria test  05/24/2018    Flu vaccine (1) 09/01/2021    A1C test (Diabetic or Prediabetic)  04/08/2022    Lipid screen  05/10/2022    Potassium monitoring  01/12/2023    Creatinine monitoring  01/12/2023    Breast cancer screen  12/01/2023    DTaP/Tdap/Td vaccine (2 - Td or Tdap) 09/14/2025    Hepatitis A vaccine  Aged Out    Hib vaccine  Aged Out    Meningococcal (ACWY) vaccine  Aged Out     Stress test 2/22/18  Summary    Supervising physician Dr. Bekah Hooks .   SUNRISE CANYON portion of stress test is negative for ischemia by diagnostic criteria.    Normal EF 45 % with normal ventricular contractility.    No infarct or ischemia noted.    Normal stress myocardial perfusion.    This is a normal study.       Echo 2/22/18  Summary   Normal left ventricle structure and function.   Ejection fraction is visually estimated at 50-60%.    Grade I diastolic dysfunction.   No significant valvular disease noted.   No evidence of pericardial effusion.     Doppler 3/26/18           No evidence of significant occlusive arterial disease.    Bilateral VERONICA's show normal arterial flow. EKG 4/14/2021  Normal sinus rhythm   Septal infarct , age undetermined   Abnormal ECG   When compared with ECG of 05-NOV-2020 20:52,   Vent. rate has decreased BY  52 BPM   Septal infarct is now present   Nonspecific T wave abnormality has replaced inverted T waves in Lateral leads   Confirmed by [facility] Hany FELIPE ([id number]) on 4/14/2021 4:47:24 PM     Echo 5/24/21  Summary   Left ventricular systolic function is mildly depressed with an ejection   fraction of 45%-50%.  Mild septal wall asymmetrical left ventricular hypertrophy.   Left Ventricular chamber size is dilated.   No significant valvular disease noted.   No evidence of pericardial effusion.     Stress test  5/14/21     Summary    Supervising physician Dr. Padmaja Humphrey .    Normal EF 45 % with normal ventricular contractility. No infarct or ischemia    noted.    Normal stress myocardial perfusion.    This is a normal study. ASSESSMENT/PLAN:    Precordial pain  Her last stress test in February 2018 and May 2021 showed no ischemia. She continues to have fleeting chest pain that does not last any significant amount of time. She thinks her chest pressure/ pain occurs with stress full situation, unfortunately high blood pressure will also cause chest pain, The pain does not  radiate, She notices more shortness of breath when she is not taking her water pill.     Cardiomyopathy  EF of 45- 50% Hypertensive heart disease? Ef is low normal but she appears euvolemic. On Lotrel and Lasix. No reported adverse events or reported side effects from medication(s). She is stable on current dose.      Essential hypertension  Controlled. She brought her medications into the office with her this morning and medications verified. On Lotrel and Lasix.  No reported adverse events or reported side effects from medication(s). She is stable on current dose.      Claudication [facility]) ruled out  Arterial Doppler in March 2018 did not show any abnormality, it may be neuropathy      Dyslipidemia  Carmelina Rios had lab work recently,  Lipid profile was reviewed with patient. Ideally she should be on statins due to diabetes. PCP has her tolerating simvastatin. She is tolerating. She has not had recent labs. Will check labs and reorder her simvastatin.      Counseled extensively and medication compliance urged. We discussed that for the  prevention of ASCVD our  goal is aggressive risk modification. Patient is encouraged to exercise even a brisk walk for 30 minutes  at least 3 to 4 times a week           Various goals were discussed and questions answered. Continue current medications. Appropriate prescriptions are addressed and refills ordered. Questions answered and patient verbalizes understanding. Call for any problems, questions, or concerns. Return in about 6 months (around 9/7/2022). An electronic signature was used to authenticate this note.     Electronically signed by JOSE Gtz CNP on 3/7/2022 at 10:43 AM

## 2022-03-08 LAB
A/G RATIO: 1.7 (ref 1.1–2.2)
ALBUMIN SERPL-MCNC: 4.7 G/DL (ref 3.4–5)
ALP BLD-CCNC: 79 U/L (ref 40–129)
ALT SERPL-CCNC: 24 U/L (ref 10–40)
ANION GAP SERPL CALCULATED.3IONS-SCNC: 24 MMOL/L (ref 3–16)
AST SERPL-CCNC: 19 U/L (ref 15–37)
BILIRUB SERPL-MCNC: 0.4 MG/DL (ref 0–1)
BUN BLDV-MCNC: 11 MG/DL (ref 7–20)
CALCIUM SERPL-MCNC: 10.5 MG/DL (ref 8.3–10.6)
CHLORIDE BLD-SCNC: 102 MMOL/L (ref 99–110)
CHOLESTEROL, FASTING: 211 MG/DL (ref 0–199)
CO2: 22 MMOL/L (ref 21–32)
CREAT SERPL-MCNC: 0.8 MG/DL (ref 0.6–1.2)
GFR AFRICAN AMERICAN: >60
GFR NON-AFRICAN AMERICAN: >60
GLUCOSE BLD-MCNC: 173 MG/DL (ref 70–99)
HDLC SERPL-MCNC: 47 MG/DL (ref 40–60)
LDL CHOLESTEROL CALCULATED: 126 MG/DL
POTASSIUM SERPL-SCNC: 4.4 MMOL/L (ref 3.5–5.1)
SODIUM BLD-SCNC: 148 MMOL/L (ref 136–145)
TOTAL PROTEIN: 7.5 G/DL (ref 6.4–8.2)
TRIGLYCERIDE, FASTING: 191 MG/DL (ref 0–150)
VLDLC SERPL CALC-MCNC: 38 MG/DL

## 2022-03-11 DIAGNOSIS — E78.5 DYSLIPIDEMIA: Primary | ICD-10-CM

## 2022-03-11 RX ORDER — SIMVASTATIN 40 MG
40 TABLET ORAL NIGHTLY
Qty: 90 TABLET | Refills: 3 | Status: SHIPPED | OUTPATIENT
Start: 2022-03-11 | End: 2022-08-04 | Stop reason: ALTCHOICE

## 2022-03-14 ENCOUNTER — TELEPHONE (OUTPATIENT)
Dept: CARDIOLOGY CLINIC | Age: 62
End: 2022-03-14

## 2022-04-21 ENCOUNTER — HOSPITAL ENCOUNTER (OUTPATIENT)
Age: 62
Discharge: HOME | End: 2022-04-21
Payer: MEDICAID

## 2022-04-21 ENCOUNTER — HOSPITAL ENCOUNTER (OUTPATIENT)
Dept: GENERAL RADIOLOGY | Age: 62
Discharge: HOME | End: 2022-04-21
Payer: MEDICAID

## 2022-04-21 DIAGNOSIS — M79.671 RIGHT FOOT PAIN: ICD-10-CM

## 2022-04-21 PROCEDURE — 73630 X-RAY EXAM OF FOOT: CPT

## 2022-04-28 DIAGNOSIS — I10 ESSENTIAL HYPERTENSION: ICD-10-CM

## 2022-04-29 RX ORDER — AMLODIPINE BESYLATE AND BENAZEPRIL HYDROCHLORIDE 10; 40 MG/1; MG/1
CAPSULE ORAL
Qty: 180 CAPSULE | Refills: 1 | Status: SHIPPED | OUTPATIENT
Start: 2022-04-29

## 2022-05-10 ENCOUNTER — HOSPITAL ENCOUNTER (EMERGENCY)
Age: 62
Discharge: HOME | End: 2022-05-10
Payer: MEDICAID

## 2022-05-10 ENCOUNTER — APPOINTMENT (OUTPATIENT)
Dept: GENERAL RADIOLOGY | Age: 62
End: 2022-05-10
Payer: MEDICAID

## 2022-05-10 VITALS
HEART RATE: 90 BPM | DIASTOLIC BLOOD PRESSURE: 97 MMHG | OXYGEN SATURATION: 95 % | WEIGHT: 293 LBS | RESPIRATION RATE: 21 BRPM | HEIGHT: 68 IN | TEMPERATURE: 98.1 F | SYSTOLIC BLOOD PRESSURE: 159 MMHG | BODY MASS INDEX: 44.41 KG/M2

## 2022-05-10 DIAGNOSIS — M79.671 BILATERAL FOOT PAIN: Primary | ICD-10-CM

## 2022-05-10 DIAGNOSIS — M79.672 BILATERAL FOOT PAIN: Primary | ICD-10-CM

## 2022-05-10 LAB — URIC ACID: 6.6 MG/DL (ref 2.6–6)

## 2022-05-10 PROCEDURE — 99284 EMERGENCY DEPT VISIT MOD MDM: CPT

## 2022-05-10 PROCEDURE — 6370000000 HC RX 637 (ALT 250 FOR IP): Performed by: PHYSICIAN ASSISTANT

## 2022-05-10 PROCEDURE — 96372 THER/PROPH/DIAG INJ SC/IM: CPT

## 2022-05-10 PROCEDURE — 73630 X-RAY EXAM OF FOOT: CPT

## 2022-05-10 PROCEDURE — 6360000002 HC RX W HCPCS: Performed by: PHYSICIAN ASSISTANT

## 2022-05-10 PROCEDURE — 84550 ASSAY OF BLOOD/URIC ACID: CPT

## 2022-05-10 RX ORDER — HYDROCODONE BITARTRATE AND ACETAMINOPHEN 5; 325 MG/1; MG/1
1 TABLET ORAL ONCE
Status: COMPLETED | OUTPATIENT
Start: 2022-05-10 | End: 2022-05-10

## 2022-05-10 RX ORDER — HYDROCODONE BITARTRATE AND ACETAMINOPHEN 5; 325 MG/1; MG/1
1 TABLET ORAL EVERY 6 HOURS PRN
Qty: 10 TABLET | Refills: 0 | Status: SHIPPED | OUTPATIENT
Start: 2022-05-10 | End: 2022-05-13

## 2022-05-10 RX ORDER — MORPHINE SULFATE 4 MG/ML
4 INJECTION, SOLUTION INTRAMUSCULAR; INTRAVENOUS ONCE
Status: COMPLETED | OUTPATIENT
Start: 2022-05-10 | End: 2022-05-10

## 2022-05-10 RX ORDER — INDOMETHACIN 50 MG/1
50 CAPSULE ORAL 3 TIMES DAILY PRN
Qty: 30 CAPSULE | Refills: 0 | Status: SHIPPED | OUTPATIENT
Start: 2022-05-10

## 2022-05-10 RX ADMIN — MORPHINE SULFATE 4 MG: 4 INJECTION, SOLUTION INTRAMUSCULAR; INTRAVENOUS at 05:46

## 2022-05-10 RX ADMIN — HYDROCODONE BITARTRATE AND ACETAMINOPHEN 1 TABLET: 5; 325 TABLET ORAL at 03:55

## 2022-05-10 ASSESSMENT — PAIN SCALES - GENERAL
PAINLEVEL_OUTOF10: 8
PAINLEVEL_OUTOF10: 5
PAINLEVEL_OUTOF10: 10
PAINLEVEL_OUTOF10: 10

## 2022-05-10 ASSESSMENT — PAIN DESCRIPTION - PAIN TYPE
TYPE: ACUTE PAIN
TYPE: ACUTE PAIN

## 2022-05-10 ASSESSMENT — PAIN DESCRIPTION - ORIENTATION
ORIENTATION: LEFT;RIGHT
ORIENTATION: RIGHT;LEFT

## 2022-05-10 ASSESSMENT — PAIN DESCRIPTION - DESCRIPTORS
DESCRIPTORS: ACHING
DESCRIPTORS: SHARP;SHOOTING
DESCRIPTORS: ACHING

## 2022-05-10 ASSESSMENT — PAIN DESCRIPTION - FREQUENCY: FREQUENCY: CONTINUOUS

## 2022-05-10 ASSESSMENT — PAIN DESCRIPTION - LOCATION
LOCATION: FOOT

## 2022-05-10 ASSESSMENT — PAIN - FUNCTIONAL ASSESSMENT: PAIN_FUNCTIONAL_ASSESSMENT: NONE - DENIES PAIN

## 2022-05-10 NOTE — ED NOTES
Discharge instructions reviewed with patient and all questions addressed. Patient alert and oriented x4 at time of discharge. Patient ambulatory and steady gait.       Dago Kellogg RN  05/10/22 [id number]

## 2022-05-10 NOTE — ED NOTES
Patient brought in via EMS. Was seen today and [facility] ER and diagnosis with Cellulitis of her feet and has been taking antibiotics. Patient has not been able to sleep due to the pain in her feet and called the ambulance this evening. Patient is alert and oriented.       Keli Wick RN  05/10/22 [id number]

## 2022-08-04 ENCOUNTER — TELEPHONE (OUTPATIENT)
Dept: CARDIOLOGY CLINIC | Age: 62
End: 2022-08-04

## 2022-08-04 RX ORDER — ATORVASTATIN CALCIUM 40 MG/1
40 TABLET, FILM COATED ORAL DAILY
Qty: 90 TABLET | Refills: 1 | Status: SHIPPED | OUTPATIENT
Start: 2022-08-04

## 2022-08-04 NOTE — TELEPHONE ENCOUNTER
Pharmacy recommends changing Simvastatin to 20mg or change to Atorvastatin 40mg due to 10mg dose of Amlodipine

## 2022-08-04 NOTE — TELEPHONE ENCOUNTER
Jani Franks called from [facility], pt has a drug interaction. Needs to speak with nurse. Also states pt wants pharm changed to them.

## 2022-08-04 NOTE — PROGRESS NOTES
Please call patient and let her know that the pharmacy at [facility] requested her cholesterol medication to be changed. I stopped zocor and started lipitor.

## 2022-09-28 ENCOUNTER — OFFICE VISIT (OUTPATIENT)
Dept: CARDIOLOGY CLINIC | Age: 62
End: 2022-09-28
Payer: MEDICAID

## 2022-09-28 VITALS
BODY MASS INDEX: 42.07 KG/M2 | SYSTOLIC BLOOD PRESSURE: 128 MMHG | HEART RATE: 78 BPM | OXYGEN SATURATION: 98 % | DIASTOLIC BLOOD PRESSURE: 76 MMHG | HEIGHT: 68 IN | WEIGHT: 277.6 LBS

## 2022-09-28 DIAGNOSIS — R07.2 PRECORDIAL PAIN: ICD-10-CM

## 2022-09-28 DIAGNOSIS — I10 ESSENTIAL HYPERTENSION: Primary | ICD-10-CM

## 2022-09-28 DIAGNOSIS — I42.9 CARDIOMYOPATHY, UNSPECIFIED TYPE (HCC): ICD-10-CM

## 2022-09-28 DIAGNOSIS — E78.5 DYSLIPIDEMIA: ICD-10-CM

## 2022-09-28 DIAGNOSIS — E11.9 TYPE 2 DIABETES MELLITUS WITHOUT COMPLICATION, WITHOUT LONG-TERM CURRENT USE OF INSULIN (HCC): ICD-10-CM

## 2022-09-28 DIAGNOSIS — I73.9 CLAUDICATION (HCC): ICD-10-CM

## 2022-09-28 PROCEDURE — 3046F HEMOGLOBIN A1C LEVEL >9.0%: CPT | Performed by: NURSE PRACTITIONER

## 2022-09-28 PROCEDURE — 99214 OFFICE O/P EST MOD 30 MIN: CPT | Performed by: NURSE PRACTITIONER

## 2022-09-28 ASSESSMENT — ENCOUNTER SYMPTOMS
COUGH: 0
SHORTNESS OF BREATH: 1

## 2022-09-28 NOTE — PROGRESS NOTES
Meg Farrell  [facility] [address]  Phone: [phone]    Fax [phone]    Manoj Sawyer MD, Georgette Shelton MD, [address] [facility], MD, MD Meseret Hayes MD Geronimo Reef, MD Manuella Chyle, MD Justo Ferri, JOSE Guzmán, JOSE Coates, APRANTON Mcintosh, APRANTON    CARDIOLOGY  NOTE    2022    Cierra Torres (:  1960) is a 58 y.o. female,Established patient, here for evaluation of the following chief complaint(s):  6 Month Follow-Up (No complaints/Former smoker)        SUBJECTIVE/OBJECTIVE:    HPI  Patient is here to follow up on her HTN, dyslipidemia, and chest pain. She reports that the pain does return intermittently, both at rest and with activity. The pain is not consistent and has not worsened. She has not taken anything to alleviate the pain and she states that the pain does not last more than 5 minutes. Patient feels that her shortness of breath is normal for her from her COPD. She does not exercise. She denies issues getting or taking her medications. Denies palpations, dizziness, orthopnea, swelling of the lower legs, or syncope. Review of Systems   Constitutional:  Negative for fatigue and fever. Respiratory:  Positive for shortness of breath. Negative for cough. Cardiovascular:  Positive for chest pain (gone as fast as it started). Negative for palpitations and leg swelling. Musculoskeletal:  Positive for gait problem (uses a cane). Negative for arthralgias. Neurological:  Negative for dizziness, syncope, weakness, light-headedness and headaches.      Vitals:    22 1533   BP: 128/76   Pulse: 78   SpO2: 98%   Weight: 277 lb 9.6 oz (125.9 kg)   Height: 5' 8\" (1.727 m)       Wt Readings from Last 3 Encounters:   22 277 lb 9.6 oz (125.9 kg)   05/10/22 300 lb (136.1 kg)   22 275 lb 9.6 oz (125 kg)       BP Readings from Last 3 Encounters:   22 128/76   05/10/22 (!) 159/97   03/07/22 122/72       Prior to Admission medications    Medication Sig Start Date End Date Taking? Authorizing Provider   atorvastatin (LIPITOR) 40 MG tablet Take 1 tablet by mouth in the morning.  8/4/22  Yes Oumou Saunders APRN - CNP   indomethacin (INDOCIN) 50 MG capsule Take 1 capsule by mouth 3 times daily as needed (pain) 5/10/22  Yes ANTONY WangC   amLODIPine-benazepril (LOTREL) 10-40 MG per capsule take 1 capsule by mouth once daily 4/29/22  Yes Ara Ghosh APRN - CNP   Dulaglutide (TRULICITY) 1.5 JQ/0.3PK SOPN Inject 1.5 mg into the skin once a week   Yes Historical Provider, MD   tiotropium (SPIRIVA RESPIMAT) 2.5 MCG/ACT AERS inhaler Inhale 2 puffs into the lungs daily   Yes Historical Provider, MD   Ascorbic Acid (VITAMIN C) 250 MG tablet Take 1,000 mg by mouth daily   Yes Historical Provider, MD   Cholecalciferol (VITAMIN D3) 50 MCG (2000 UT) CAPS Take 1 capsule by mouth   Yes Historical Provider, MD MENDOZA ALCOHOL SWABS 70 % PADS use as directed three times a day 9/24/19  Yes Historical Provider, MD   metFORMIN (GLUCOPHAGE) 850 MG tablet Take 1 tablet by mouth 2 times daily (with meals) 2/26/19  Yes Yaritza Cook MD   insulin lispro (HUMALOG) 100 UNIT/ML injection vial Inject 30 Units into the skin 3 times daily (before meals) 2/26/19  Yes Yaritza Cook MD   mometasone-formoterol [facility]) 100-5 MCG/ACT inhaler Inhale 2 puffs into the lungs 2 times daily 2/26/19  Yes Yaritza Cook MD   albuterol (PROVENTIL) (2.5 MG/3ML) 0.083% nebulizer solution  2/21/19  Yes Historical Provider, MD   dicyclomine (BENTYL) 20 MG tablet Take 20 mg by mouth every 6 hours   Yes Historical Provider, MD   VENTOLIN  (90 Base) MCG/ACT inhaler  2/27/18  Yes Historical Provider, MD BERMEO 0.01 % SOLN ophthalmic drops Place 1 drop into both eyes daily  1/22/18  Yes Historical Provider, MD   pantoprazole (PROTONIX) 40 MG tablet Take 40 mg by mouth daily   Yes Historical Provider, MD   furosemide (LASIX) 20 MG tablet take 1/2 tablet every few weeks 1/16/18  Yes Historical Provider, MD   Misc. Devices (CANE) MISC 1 Device by Does not apply route daily as needed. 4/1/14  Yes Jamee Lloyd PA-C   DULoxetine (CYMBALTA) 60 MG capsule Take 60 mg by mouth daily. Yes Historical Provider, MD   insulin glargine (LANTUS) 100 UNIT/ML injection Inject 50 Units into the skin nightly    Yes Historical Provider, MD   acetaminophen (APAP EXTRA STRENGTH) 500 MG tablet Take 1 tablet by mouth every 6 hours as needed for Pain  Patient not taking: Reported on 9/28/2022 4/14/21   Katarina Montalvo PA-C   bacitracin-neomycin-polymyxin b-hydrocortisone 1 % ointment Apply topically 2 times daily. Patient not taking: Reported on 9/28/2022 3/3/21   Gaviota Katz PA-C   ondansetron (ZOFRAN ODT) 4 MG disintegrating tablet Take 1 tablet by mouth every 8 hours as needed for Nausea  Patient not taking: No sig reported 12/22/19   Jeromy Henry DO   linagliptin (TRADJENTA) 5 MG tablet Take 1 tablet by mouth daily  Patient not taking: No sig reported 2/27/19   Elfego Gonzalez MD   guaiFENesin (MUCINEX) 600 MG extended release tablet Take 1 tablet by mouth 2 times daily  Patient not taking: No sig reported 2/26/19   Elfego Gonzalez MD       Physical Exam  Vitals reviewed. Constitutional:       General: She is not in acute distress. Appearance: Normal appearance. She is obese. She is not ill-appearing. HENT:      Head: Atraumatic. Neck:      Vascular: No carotid bruit. Cardiovascular:      Rate and Rhythm: Normal rate and regular rhythm. Pulses: Normal pulses. Heart sounds: Normal heart sounds. No murmur heard. Pulmonary:      Effort: Pulmonary effort is normal. No respiratory distress. Breath sounds: Normal breath sounds. Musculoskeletal:         General: No swelling or deformity. Cervical back: Neck supple. No muscular tenderness.    Neurological:      Mental Status: She is alert. Health Maintenance   Topic Date Due    COVID-19 Vaccine (1) Never done    Pneumococcal 0-64 years Vaccine (1 - PCV) Never done    Diabetic foot exam  Never done    Depression Screen  Never done    HIV screen  Never done    Diabetic retinal exam  Never done    Hepatitis C screen  Never done    Cervical cancer screen  Never done    Colorectal Cancer Screen  Never done    Shingles vaccine (1 of 2) Never done    Diabetic microalbuminuria test  05/24/2018    A1C test (Diabetic or Prediabetic)  04/08/2022    Flu vaccine (1) 08/01/2022    Lipids  03/07/2023    Breast cancer screen  12/01/2023    DTaP/Tdap/Td vaccine (2 - Td or Tdap) 09/14/2025    Hepatitis A vaccine  Aged Out    Hib vaccine  Aged Out    Meningococcal (ACWY) vaccine  Aged Out     Stress test 2/22/18  Summary    Supervising physician Dr. Ami St . ECG portion of stress test is negative for ischemia by diagnostic criteria. Normal EF 45 % with normal ventricular contractility. No infarct or ischemia noted. Normal stress myocardial perfusion. This is a normal study. Echo 2/22/18  Summary   Normal left ventricle structure and function. Ejection fraction is visually estimated at 50-60%. Grade I diastolic dysfunction. No significant valvular disease noted. No evidence of pericardial effusion. Doppler 3/26/18           No evidence of significant occlusive arterial disease. Bilateral VERONICA's show normal arterial flow. EKG 4/14/2021  Normal sinus rhythm   Septal infarct , age undetermined   Abnormal ECG   When compared with ECG of 05-NOV-2020 20:52,   Vent. rate has decreased BY  52 BPM   Septal infarct is now present   Nonspecific T wave abnormality has replaced inverted T waves in Lateral leads   Confirmed by [facility] Kayla FELIPE ([id number]) on 4/14/2021 4:47:24 PM     Echo 5/24/21  Summary   Left ventricular systolic function is mildly depressed with an ejection   fraction of 45%-50%.    Mild septal wall asymmetrical left ventricular hypertrophy. Left Ventricular chamber size is dilated. No significant valvular disease noted. No evidence of pericardial effusion. Stress test  5/14/21     Summary    Supervising physician Dr. Abbie Galaviz .    Normal EF 45 % with normal ventricular contractility. No infarct or ischemia    noted. Normal stress myocardial perfusion. This is a normal study. ASSESSMENT/PLAN:    Precordial pain  Her last stress test in February 2018 and May 2021 showed no ischemia. She continues to have fleeting chest pain that does not last any significant amount of time. She thinks her chest pressure/ pain occurs with stress full situation, unfortunately high blood pressure will also cause chest pain, The pain does not  radiate, She notices more shortness of breath when she is not taking her water pill. Cardiomyopathy  EF of 45- 50% Hypertensive heart disease? Ef is low normal but she appears euvolemic. On Lotrel. No reported adverse events or reported side effects from medication(s). She is stable on current dose. She uses lasix if she is short of breath. Typically it is once every couple weeks. Essential hypertension  Controlled. On Lotrel. No reported adverse events or reported side effects from medication(s). She is stable on current dose. Claudication [facility]) ruled out  Arterial Doppler in March 2018 did not show any abnormality, it may be neuropathy      Dyslipidemia  Bandar Zafar had lab work recently,  Lipid profile was reviewed with patient. Ideally she should be on statins due to diabetes. PCP changed her to lipitor 40 mg daily. She is tolerating. She has not had recent labs. Will check labs and reorder her simvastatin.        Latest Reference Range & Units 3/7/22 11:15   Cholesterol, Fasting 0 - 199 mg/dL 211 (H)   HDL Cholesterol 40 - 60 mg/dL 47   LDL Calculated <100 mg/dL 126 (H)   Triglyceride, Fasting 0 - 150 mg/dL 191 (H)   VLDL Cholesterol Calculated Not Established mg/dL 38   (H): Data is abnormally high    Counseled extensively and medication compliance urged. We discussed that for the  prevention of ASCVD our  goal is aggressive risk modification. Patient is encouraged to exercise even a brisk walk for 30 minutes  at least 3 to 4 times a week           Various goals were discussed and questions answered. Continue current medications. Appropriate prescriptions are addressed and refills ordered. Questions answered and patient verbalizes understanding. Call for any problems, questions, or concerns. Return in about 6 months (around 3/28/2023). An electronic signature was used to authenticate this note.     Electronically signed by JOSE Morris CNP on 9/28/2022 at 3:41 PM

## 2022-12-28 NOTE — FLOWSHEET NOTE
Outpatient Physical Therapy  [facility]           [x] Phone: [phone]   Fax: [phone]  Tricia Ramirez           [] Phone: [phone]   Fax: [phone]        Physical Therapy Daily Treatment Note  Date:  2019    Patient Name:  Priscilla Elmore    :  1960  MRN: [id number]  Restrictions/Precautions:    Diagnosis:   Diagnosis: back, leg and arm pain  Date of Injury/Surgery:   Treatment Diagnosis:  chronic mult-jt pain with poor strength and endurance    Insurance/Certification information: Medicaid   Referring Physician: Dr Angel Ng MD  Next Doctor Visit:  19  Plan of care signed (Y/N): Pending   Outcome Measure: Mod Oswestry  34/50;  68%  Visit# / total visits:   2/10  Pain level: 7/10   Goals:       Short term goals  Time Frame for Short term goals: 10 Rxs  Short term goal 1: Pt will be compliant with attendance of pool appointments  Short term goal 2: Pt will report 20 - 30% improvement in her pain during ambulation  Short term goal 3: Oswestry score < 25/50 indicating functional improvement       Summary of Evaluation: Pt reporting chronic back, B leg, B shoulder, B wrist and R jaw pain. Pt reporting initially a back injury 20 years ago, lifting injury, and she states her back has never gotten any better. States previous PT, injections, and chiropractic without improvement. Sees Dr Manuel South for pain management. She reports her family Dr told her she has neuropathies in her legs and in her R arm. Reports sedentary lifestyle. Reports pain limits sitting to < 30', standing < 10', ambulating to very short distances with SPC, and sleep to < 4 hours. Reports she use to enjoy going to [facility] to exercise but hasn't been able to for 2-3 because of the pain. Subjective:  States she was so sore after last treatment- but felt she used muscles she hadn't in a long time        Any changes in Ambulatory Summary Sheet?   None        Objective:  See aquatic flow sheet          Exercises: (No more than 4 columns)   Exercise/Equipment Date  4/23/19 5/3/19 #2/10 5-5-19 2/10       pool pool   WARM UP  See aquatic therapy flow sheet for details See aquatic therapy flow sheet for details                  Sitting       B scap retraction  2x5/2\"     TrA 2x5/3\"     Marching  1x10 L/R     LAQ's 1x10 L/R              STANDING                                                     PROPRIOCEPTION                                    MODALITIES                      Other Therapeutic Activities/Education: Pt received education on their current pathology and how their condition affects functional activities. Pt understood discussion and questions were answered. Pt understands their activity limitations and understands rational for treatment progression. Home Exercise Program:  Written HEP as outlined above to patient. Will review as needed. Manual Treatments:        Modalities:        Communication with other providers:  POC to referring physician.       Assessment: Pain remained 7/10 after therapy             Plan for Next Session:  begin aquatic therapy      Time In / Time Out:  4333-8036      Timed Code/Total Treatment Minutes: 38      Next Progress Note due:  [address] of Care Interventions:  [x] Therapeutic Exercise  [] Modalities:  [] Therapeutic Activity     [] Ultrasound  [] Estim  [] Gait Training      [] Cervical Traction [] Lumbar Traction  [] Neuromuscular Re-education    [] Cold/hotpack [] Iontophoresis   [x] Instruction in HEP      [] Vasopneumatic   [] Dry Needling    [] Manual Therapy               [x] Aquatic Therapy              Electronically signed by:  Xin Walker PTA    5/7/2019, 12:09 PM (V5) oriented

## 2023-01-23 RX ORDER — ATORVASTATIN CALCIUM 40 MG/1
40 TABLET, FILM COATED ORAL DAILY
Qty: 90 TABLET | Refills: 1 | Status: SHIPPED | OUTPATIENT
Start: 2023-01-23

## 2023-02-22 ENCOUNTER — APPOINTMENT (OUTPATIENT)
Dept: CT IMAGING | Age: 63
End: 2023-02-22
Payer: MEDICAID

## 2023-02-22 ENCOUNTER — HOSPITAL ENCOUNTER (EMERGENCY)
Age: 63
Discharge: HOME | End: 2023-02-22
Attending: EMERGENCY MEDICINE
Payer: MEDICAID

## 2023-02-22 VITALS
OXYGEN SATURATION: 93 % | TEMPERATURE: 97.8 F | SYSTOLIC BLOOD PRESSURE: 132 MMHG | HEART RATE: 92 BPM | DIASTOLIC BLOOD PRESSURE: 74 MMHG | RESPIRATION RATE: 14 BRPM | WEIGHT: 277 LBS | BODY MASS INDEX: 41.98 KG/M2 | HEIGHT: 68 IN

## 2023-02-22 DIAGNOSIS — K52.9 ENTERITIS: ICD-10-CM

## 2023-02-22 DIAGNOSIS — R10.84 GENERALIZED ABDOMINAL PAIN: Primary | ICD-10-CM

## 2023-02-22 DIAGNOSIS — R11.2 NAUSEA VOMITING AND DIARRHEA: ICD-10-CM

## 2023-02-22 DIAGNOSIS — R19.7 NAUSEA VOMITING AND DIARRHEA: ICD-10-CM

## 2023-02-22 LAB
ALBUMIN SERPL-MCNC: 4 GM/DL (ref 3.4–5)
ALP BLD-CCNC: 91 IU/L (ref 40–129)
ALT SERPL-CCNC: 27 U/L (ref 10–40)
ANION GAP SERPL CALCULATED.3IONS-SCNC: 14 MMOL/L (ref 4–16)
AST SERPL-CCNC: 23 IU/L (ref 15–37)
BASOPHILS ABSOLUTE: 0 K/CU MM
BASOPHILS RELATIVE PERCENT: 0.1 % (ref 0–1)
BILIRUB SERPL-MCNC: 0.3 MG/DL (ref 0–1)
BUN SERPL-MCNC: 12 MG/DL (ref 6–23)
CALCIUM SERPL-MCNC: 9.2 MG/DL (ref 8.3–10.6)
CHLORIDE BLD-SCNC: 103 MMOL/L (ref 99–110)
CO2: 24 MMOL/L (ref 21–32)
CREAT SERPL-MCNC: 0.6 MG/DL (ref 0.6–1.1)
DIFFERENTIAL TYPE: ABNORMAL
EOSINOPHILS ABSOLUTE: 0.1 K/CU MM
EOSINOPHILS RELATIVE PERCENT: 1.2 % (ref 0–3)
GFR SERPL CREATININE-BSD FRML MDRD: >60 ML/MIN/1.73M2
GLUCOSE SERPL-MCNC: 259 MG/DL (ref 70–99)
HCT VFR BLD CALC: 48.9 % (ref 37–47)
HEMOGLOBIN: 15.2 GM/DL (ref 12.5–16)
IMMATURE NEUTROPHIL %: 0.6 % (ref 0–0.43)
LACTATE: 2.2 MMOL/L (ref 0.5–1.9)
LIPASE: 28 IU/L (ref 13–60)
LYMPHOCYTES ABSOLUTE: 1.2 K/CU MM
LYMPHOCYTES RELATIVE PERCENT: 12.8 % (ref 24–44)
MCH RBC QN AUTO: 26 PG (ref 27–31)
MCHC RBC AUTO-ENTMCNC: 31.1 % (ref 32–36)
MCV RBC AUTO: 83.6 FL (ref 78–100)
MONOCYTES ABSOLUTE: 0.6 K/CU MM
MONOCYTES RELATIVE PERCENT: 6.4 % (ref 0–4)
NUCLEATED RBC %: 0 %
PDW BLD-RTO: 13.2 % (ref 11.7–14.9)
PLATELET # BLD: 197 K/CU MM (ref 140–440)
PMV BLD AUTO: 9.8 FL (ref 7.5–11.1)
POTASSIUM SERPL-SCNC: 3.8 MMOL/L (ref 3.5–5.1)
RBC # BLD: 5.85 M/CU MM (ref 4.2–5.4)
SEGMENTED NEUTROPHILS ABSOLUTE COUNT: 7.4 K/CU MM
SEGMENTED NEUTROPHILS RELATIVE PERCENT: 78.9 % (ref 36–66)
SODIUM BLD-SCNC: 141 MMOL/L (ref 135–145)
TOTAL IMMATURE NEUTOROPHIL: 0.06 K/CU MM
TOTAL NUCLEATED RBC: 0 K/CU MM
TOTAL PROTEIN: 7.2 GM/DL (ref 6.4–8.2)
WBC # BLD: 9.4 K/CU MM (ref 4–10.5)

## 2023-02-22 PROCEDURE — 74176 CT ABD & PELVIS W/O CONTRAST: CPT

## 2023-02-22 PROCEDURE — 96375 TX/PRO/DX INJ NEW DRUG ADDON: CPT

## 2023-02-22 PROCEDURE — 83690 ASSAY OF LIPASE: CPT

## 2023-02-22 PROCEDURE — 99284 EMERGENCY DEPT VISIT MOD MDM: CPT

## 2023-02-22 PROCEDURE — 2580000003 HC RX 258: Performed by: EMERGENCY MEDICINE

## 2023-02-22 PROCEDURE — 80053 COMPREHEN METABOLIC PANEL: CPT

## 2023-02-22 PROCEDURE — 85025 COMPLETE CBC W/AUTO DIFF WBC: CPT

## 2023-02-22 PROCEDURE — 96374 THER/PROPH/DIAG INJ IV PUSH: CPT

## 2023-02-22 PROCEDURE — 6360000002 HC RX W HCPCS: Performed by: EMERGENCY MEDICINE

## 2023-02-22 PROCEDURE — 83605 ASSAY OF LACTIC ACID: CPT

## 2023-02-22 RX ORDER — DICYCLOMINE HCL 20 MG
20 TABLET ORAL 4 TIMES DAILY
Qty: 30 TABLET | Refills: 0 | Status: SHIPPED | OUTPATIENT
Start: 2023-02-22

## 2023-02-22 RX ORDER — ONDANSETRON 4 MG/1
4 TABLET, ORALLY DISINTEGRATING ORAL 3 TIMES DAILY PRN
Qty: 21 TABLET | Refills: 0 | Status: SHIPPED | OUTPATIENT
Start: 2023-02-22

## 2023-02-22 RX ORDER — 0.9 % SODIUM CHLORIDE 0.9 %
1000 INTRAVENOUS SOLUTION INTRAVENOUS ONCE
Status: COMPLETED | OUTPATIENT
Start: 2023-02-22 | End: 2023-02-22

## 2023-02-22 RX ORDER — ONDANSETRON 2 MG/ML
4 INJECTION INTRAMUSCULAR; INTRAVENOUS EVERY 6 HOURS PRN
Status: DISCONTINUED | OUTPATIENT
Start: 2023-02-22 | End: 2023-02-23 | Stop reason: HOSPADM

## 2023-02-22 RX ORDER — MORPHINE SULFATE 4 MG/ML
4 INJECTION, SOLUTION INTRAMUSCULAR; INTRAVENOUS ONCE
Status: COMPLETED | OUTPATIENT
Start: 2023-02-22 | End: 2023-02-22

## 2023-02-22 RX ADMIN — SODIUM CHLORIDE 1000 ML: 9 INJECTION, SOLUTION INTRAVENOUS at 21:36

## 2023-02-22 RX ADMIN — ONDANSETRON 4 MG: 2 INJECTION INTRAMUSCULAR; INTRAVENOUS at 21:37

## 2023-02-22 RX ADMIN — MORPHINE SULFATE 4 MG: 4 INJECTION, SOLUTION INTRAMUSCULAR; INTRAVENOUS at 21:37

## 2023-02-22 ASSESSMENT — PAIN DESCRIPTION - ORIENTATION
ORIENTATION: MID;LOWER;UPPER
ORIENTATION: MID;UPPER;LOWER

## 2023-02-22 ASSESSMENT — PAIN DESCRIPTION - FREQUENCY: FREQUENCY: CONTINUOUS

## 2023-02-22 ASSESSMENT — PAIN - FUNCTIONAL ASSESSMENT
PAIN_FUNCTIONAL_ASSESSMENT: 0-10
PAIN_FUNCTIONAL_ASSESSMENT: 0-10
PAIN_FUNCTIONAL_ASSESSMENT: ACTIVITIES ARE NOT PREVENTED
PAIN_FUNCTIONAL_ASSESSMENT: ACTIVITIES ARE NOT PREVENTED

## 2023-02-22 ASSESSMENT — PAIN SCALES - GENERAL
PAINLEVEL_OUTOF10: 9
PAINLEVEL_OUTOF10: 5
PAINLEVEL_OUTOF10: 9

## 2023-02-22 ASSESSMENT — LIFESTYLE VARIABLES
HOW OFTEN DO YOU HAVE A DRINK CONTAINING ALCOHOL: NEVER
HOW MANY STANDARD DRINKS CONTAINING ALCOHOL DO YOU HAVE ON A TYPICAL DAY: PATIENT DOES NOT DRINK

## 2023-02-22 ASSESSMENT — PAIN DESCRIPTION - ONSET: ONSET: SUDDEN

## 2023-02-22 ASSESSMENT — PAIN DESCRIPTION - LOCATION: LOCATION: ABDOMEN

## 2023-02-22 ASSESSMENT — PAIN DESCRIPTION - PAIN TYPE: TYPE: ACUTE PAIN

## 2023-02-22 ASSESSMENT — PAIN DESCRIPTION - DESCRIPTORS
DESCRIPTORS: SHARP
DESCRIPTORS: SHARP

## 2023-02-23 NOTE — ED NOTES
This nurse turned off pts oxygen to 0L, oxygen maintained at 93 and above with no oxygen.      Roberta Nuñez RN  02/22/23 8054

## 2023-02-23 NOTE — DISCHARGE INSTRUCTIONS
Follow-up with primary care physician in 1 to 2 days for reevaluation. Call for an appointment  Take Zofran as needed for nausea and vomiting  Take Bentyl as prescribed for abdominal cramps and spasm  Increase p.o. fluid intake to prevent dehydration  Return to the emergency department immediately any increased pain fever chills nausea vomiting dizzy lightheadedness or any worsening symptoms.

## 2023-02-23 NOTE — ED NOTES
Pt O2 dipped down to 86% while asleep. Pt states she wears CPAP at home. Pt requesting O2 because she wants to sleep. 2L NC at this time.  O2 sat to 97%     Diaz Allan RN  02/22/23 4840

## 2023-02-23 NOTE — ED PROVIDER NOTES
Emergency Department Encounter    Patient: Kayleigh Mortensen  MRN: [id number]  : 1960  Date of Evaluation: 2023  ED Provider:  Mahsa Pedroza DO    Triage Chief Complaint:   Abdominal Pain (Abd pain started about 1500. )    [place]:  Kayleigh Mortensen is a 58 y.o. female with history of hypertension hyperlipidemia diabetes COPD bipolar disorder to pressure as above glaucoma irritable bowel that presents to the emergency department from home for abdominal pain nausea vomiting diarrhea. Patient gives a history patient that she has had some diarrhea for the last couple days. Patient states she ate some liver pudding around 12:00 movements materials. Patient is about 3:00 she started having some abdominal pain all over her abdomen and some nausea and vomiting. She states pain 8 out of 10 on the pain scale. Patient that she has not taken any pain medication. Patient denies any history of any abdominal surgeries. States no history of acid reflux ulcers gastritis. Patient states no dysuria or hematuria. She states no history of heart disease stents open heart surgery no history of any chronic kidney disease. She denies any chest pain or shortness of breath no fever chills cough sore throat runny nose earache no sick contacts.     ROS - see HPI, below listed is current ROS at time of my eval:  General:  No fevers, no chills, no weakness  Eyes:  No recent vison changes, no discharge  ENT:  No sore throat, no nasal congestion, no hearing changes  Cardiovascular:  No chest pain, no palpitations  Respiratory:  No shortness of breath, no cough, no wheezing  Gastrointestinal: Positive for abdominal pain, nausea, vomiting, diarrhea  Musculoskeletal:  No muscle pain, no joint pain  Skin:  No rash, no pruritis, no easy bruising  Neurologic:  No speech problems, no headache, no extremity numbness, no extremity tingling, no extremity weakness  Psychiatric:  No anxiety  Genitourinary:  No dysuria, no hematuria  Endocrine:  No unexpected weight gain, no unexpected weight loss  Extremities:  no edema, no pain    Past Medical History:   Diagnosis Date    Arthritis     Asthma     Bipolar 1 disorder (HCC)     COPD (chronic obstructive pulmonary disease) (HCC)     Depression     Diabetes mellitus (HCC)     Edema     Glaucoma     History of Doppler ultrasound 03/26/2018    Arterial-bilateral VERONICA's show normal arterial flow. No signif occlusive arterial disease.     History of nuclear stress test 02/22/2018    cardiolite-EF45%,normal    Hx of Doppler echocardiogram 02/22/2018    EF50-60%,no valvular disease    Hyperlipidemia     Hypertension     IBS (irritable bowel syndrome)      Past Surgical History:   Procedure Laterality Date    TUBAL LIGATION       Family History   Problem Relation Age of Onset    Cancer Mother     Diabetes Mother     Heart Disease Mother     Cancer Father     High Blood Pressure Father     Cancer Other     Cancer Other     Diabetes Sister     Heart Disease Sister     High Blood Pressure Brother     Depression Sister      Social History     Socioeconomic History    Marital status: Legally      Spouse name: Not on file    Number of children: Not on file    Years of education: Not on file    Highest education level: Not on file   Occupational History    Not on file   Tobacco Use    Smoking status: Former    Smokeless tobacco: Never   Vaping Use    Vaping Use: Never used   Substance and Sexual Activity    Alcohol use: No    Drug use: No    Sexual activity: Not on file   Other Topics Concern    Not on file   Social History Narrative    Not on file     Social Determinants of Health     Financial Resource Strain: Not on file   Food Insecurity: Not on file   Transportation Needs: Not on file   Physical Activity: Not on file   Stress: Not on file   Social Connections: Not on file   Intimate Partner Violence: Not on file   Housing Stability: Not on file     Current Facility-Administered Medications Medication Dose Route Frequency Provider Last Rate Last Admin    0.9 % sodium chloride bolus  1,000 mL IntraVENous Once Ara Shepard,         ondansetron [facility]) injection 4 mg  4 mg IntraVENous Q6H PRN Ara Shepard,         morphine sulfate (PF) injection 4 mg  4 mg IntraVENous Once Ara Perez DO        iopamidol (ISOVUE-370) 76 % injection 80 mL  80 mL IntraVENous ONCE PRN Ara Shepard, DO         Current Outpatient Medications   Medication Sig Dispense Refill    atorvastatin (LIPITOR) 40 MG tablet Take 1 tablet by mouth daily 90 tablet 1    indomethacin (INDOCIN) 50 MG capsule Take 1 capsule by mouth 3 times daily as needed (pain) 30 capsule 0    amLODIPine-benazepril (LOTREL) 10-40 MG per capsule take 1 capsule by mouth once daily 180 capsule 1    Dulaglutide (TRULICITY) 1.5 HR/6.9FP SOPN Inject 1.5 mg into the skin once a week      tiotropium (SPIRIVA RESPIMAT) 2.5 MCG/ACT AERS inhaler Inhale 2 puffs into the lungs daily      Ascorbic Acid (VITAMIN C) 250 MG tablet Take 1,000 mg by mouth daily      Cholecalciferol (VITAMIN D3) 50 MCG (2000 UT) CAPS Take 1 capsule by mouth      acetaminophen (APAP EXTRA STRENGTH) 500 MG tablet Take 1 tablet by mouth every 6 hours as needed for Pain (Patient not taking: Reported on 9/28/2022) 20 tablet 0    bacitracin-neomycin-polymyxin b-hydrocortisone 1 % ointment Apply topically 2 times daily.  (Patient not taking: Reported on 9/28/2022) 1 Tube 0    ondansetron (ZOFRAN ODT) 4 MG disintegrating tablet Take 1 tablet by mouth every 8 hours as needed for Nausea (Patient not taking: No sig reported) 15 tablet 0    RA ALCOHOL SWABS 70 % PADS use as directed three times a day  0    linagliptin (TRADJENTA) 5 MG tablet Take 1 tablet by mouth daily (Patient not taking: No sig reported) 30 tablet 3    metFORMIN (GLUCOPHAGE) 850 MG tablet Take 1 tablet by mouth 2 times daily (with meals) 60 tablet 3    insulin lispro (HUMALOG) 100 UNIT/ML injection vial Inject 30 Units into the skin 3 times daily (before meals) 1 vial 3    guaiFENesin (MUCINEX) 600 MG extended release tablet Take 1 tablet by mouth 2 times daily (Patient not taking: No sig reported) 20 tablet 0    mometasone-formoterol (DULERA) 100-5 MCG/ACT inhaler Inhale 2 puffs into the lungs 2 times daily 13 g 3    albuterol (PROVENTIL) (2.5 MG/3ML) 0.083% nebulizer solution   0    dicyclomine (BENTYL) 20 MG tablet Take 20 mg by mouth every 6 hours      VENTOLIN  (90 Base) MCG/ACT inhaler   0    LUMIGAN 0.01 % SOLN ophthalmic drops Place 1 drop into both eyes daily   1    pantoprazole (PROTONIX) 40 MG tablet Take 40 mg by mouth daily      furosemide (LASIX) 20 MG tablet take 1/2 tablet every few weeks  0    Misc. Devices (CANE) MISC 1 Device by Does not apply route daily as needed. 1 each 0    DULoxetine (CYMBALTA) 60 MG capsule Take 60 mg by mouth daily. insulin glargine (LANTUS) 100 UNIT/ML injection Inject 50 Units into the skin nightly        Allergies   Allergen Reactions    Codeine     Depakote [Divalproex Sodium]     Adhesive Tape Itching    Toradol [Ketorolac Tromethamine] Other (See Comments)     Headache for 5-6 days       Nursing Notes Reviewed    Physical Exam:  Triage VS:    ED Triage Vitals   Enc Vitals Group      BP 02/22/23 2000 (!) 131/107      Heart Rate 02/22/23 2000 (!) 114      Resp 02/22/23 2000 22      Temp 02/22/23 2000 97.6 °F (36.4 °C)      Temp src --       SpO2 02/22/23 2000 95 %      Weight 02/22/23 2047 277 lb (125.6 kg)      Height 02/22/23 2000 5' 8\" (1.727 m)      Head Circumference --       Peak Flow --       Pain Score --       Pain Loc --       Pain Edu? --       Excl. in [address]? --    /74   Pulse 92   Temp 97.8 °F (36.6 °C) (Oral)   Resp 14   Ht 5' 8\" (1.727 m)   Wt 277 lb (125.6 kg)   SpO2 93%   BMI 42.12 kg/m²       My pulse ox interpretation is - normal    General appearance:  No acute distress. Skin:  Warm. Dry.    Eye:  Extraocular movements intact. Ears, nose, mouth and throat:  Oral mucosa moist   Neck:  Trachea midline. Extremity:  No swelling. Normal ROM     Heart:  Regular rate and rhythm, normal S1 & S2, no extra heart sounds. Perfusion:  intact  Respiratory:  Lungs clear to auscultation bilaterally. Respirations nonlabored. Abdominal: Morbidly obese abdomen normal bowel sounds. Soft. Diffuse abdominal tenderness,. Non distended. Back:  No CVA tenderness to palpation     Neurological:  Alert and oriented times 3. No focal neuro deficits.              Psychiatric:  Appropriate    I have reviewed and interpreted all of the currently available lab results from this visit (if applicable):  Results for orders placed or performed during the hospital encounter of 02/22/23   CBC with Diff   Result Value Ref Range    WBC 9.4 4.0 - 10.5 K/CU MM    RBC 5.85 (H) 4.2 - 5.4 M/CU MM    Hemoglobin 15.2 12.5 - 16.0 GM/DL    Hematocrit 48.9 (H) 37 - 47 %    MCV 83.6 78 - 100 FL    MCH 26.0 (L) 27 - 31 PG    MCHC 31.1 (L) 32.0 - 36.0 %    RDW 13.2 11.7 - 14.9 %    Platelets 674 737 - 732 K/CU MM    MPV 9.8 7.5 - 11.1 FL    Differential Type AUTOMATED DIFFERENTIAL     Segs Relative 78.9 (H) 36 - 66 %    Lymphocytes % 12.8 (L) 24 - 44 %    Monocytes % 6.4 (H) 0 - 4 %    Eosinophils % 1.2 0 - 3 %    Basophils % 0.1 0 - 1 %    Segs Absolute 7.4 K/CU MM    Lymphocytes Absolute 1.2 K/CU MM    Monocytes Absolute 0.6 K/CU MM    Eosinophils Absolute 0.1 K/CU MM    Basophils Absolute 0.0 K/CU MM    Nucleated RBC % 0.0 %    Total Nucleated RBC 0.0 K/CU MM    Total Immature Neutrophil 0.06 K/CU MM    Immature Neutrophil % 0.6 (H) 0 - 0.43 %   CMP   Result Value Ref Range    Sodium 141 135 - 145 MMOL/L    Potassium 3.8 3.5 - 5.1 MMOL/L    Chloride 103 99 - 110 mMol/L    CO2 24 21 - 32 MMOL/L    BUN 12 6 - 23 MG/DL    Creatinine 0.6 0.6 - 1.1 MG/DL    Est, Glom Filt Rate >60 >60 mL/min/1.73m2    Glucose 259 (H) 70 - 99 MG/DL    Calcium 9.2 8.3 - 10.6 MG/DL Albumin 4.0 3.4 - 5.0 GM/DL    Total Protein 7.2 6.4 - 8.2 GM/DL    Total Bilirubin 0.3 0.0 - 1.0 MG/DL    ALT 27 10 - 40 U/L    AST 23 15 - 37 IU/L    Alkaline Phosphatase 91 40 - 129 IU/L    Anion Gap 14 4 - 16   Lipase   Result Value Ref Range    Lipase 28 13 - 60 IU/L   Lactic Acid   Result Value Ref Range    Lactate 2.2 (HH) 0.5 - 1.9 mMOL/L      Radiographs (if obtained):  Radiologist's Report Reviewed:  CT ABDOMEN PELVIS WO CONTRAST Additional Contrast? None   Preliminary Result   1. Findings compatible with acute nonspecific enteritis involving   approximately 30 cm segment of mid small bowel. EKG (if obtained): (All EKG's are interpreted by myself in the absence of a cardiologist)    Medications   ondansetron (ZOFRAN) injection 4 mg (4 mg IntraVENous Given 2/22/23 2137)   iopamidol (ISOVUE-370) 76 % injection 80 mL (has no administration in time range)   0.9 % sodium chloride bolus (0 mLs IntraVENous Stopped 2/22/23 2305)   morphine sulfate (PF) injection 4 mg (4 mg IntraVENous Given 2/22/23 2137)       MDM:  Pipe James is a 58 y.o. female with history of hypertension hyperlipidemia diabetes COPD bipolar disorder to pressure as above glaucoma irritable bowel that presents to the emergency department from home for abdominal pain nausea vomiting diarrhea. Patient gives a history patient that she has had some diarrhea for the last couple days. Patient states she ate some liver pudding around 12:00 movements materials. Patient is about 3:00 she started having some abdominal pain all over her abdomen and some nausea and vomiting. She states pain 8 out of 10 on the pain scale. Patient that she has not taken any pain medication. Patient denies any history of any abdominal surgeries. States no history of acid reflux ulcers gastritis. Patient states no dysuria or hematuria. She states no history of heart disease stents open heart surgery no history of any chronic kidney disease.   She denies any chest pain or shortness of breath no fever chills cough sore throat runny nose earache no sick contacts. On physical exam patient with diffuse abdominal tenderness mostly on the left side. No rebound or rigidity some guarding noted. Patient is writhing in pain and complains of nausea. Differential does include food poisoning, gastroenteritis, diverticulitis, colitis, cholecystitis. Patient was ordered laboratory studies. Laboratory studies normal white blood cell count sodium potassium kidney function liver enzymes and lipase. Patient lactic acid is slightly elevated 2.2. Patient was ordered 1 L bolus normal saline for IV fluids, Zofran 4 mg IV, morphine 4 mg IV. He ordered a CT scan of the abdomen pelvis with IV contrast.  Patient had a goals of her CT scan test and she is now small to have IV contrast dye. CT scan was done without contrast.  CT scan of the abdomen and pelvis shows findings compatible with acute nonspecific enteritis involving approximately 30 cm segment of mid small bowel. Patient updated on laboratory imaging study findings. Patient states she got relief of the abdominal pain with above medication. Discussed with patient after her IV fluids we will recheck her vital signs. They have come down to have improved. Patient will be discharged home with Zofran 4 mg p.o. every 6-8 hours as needed for nausea or vomiting, Bentyl 20 mg 4 times a day as needed for abdominal cramps and spasm. Patient increase p.o. fluids to prevent dehydration. Patient is return immediately to the emergency department for any worsening symptoms. All questions answered. Clinical Impression:  1. Generalized abdominal pain    2. Enteritis    3. Nausea vomiting and diarrhea      Disposition referral (if applicable):   Fito Krause MD  [place] [id number]  [id number]  [address]  [phone]    Schedule an appointment as soon as possible for a visit in 2 days  If symptoms worsen and for re-evaluation. call for an appointment    [facility] Emergency Department  [address] [phone]  [phone]  Go in 1 day  If symptoms worsen  Disposition medications (if applicable):  New Prescriptions    DICYCLOMINE (BENTYL) 20 MG TABLET    Take 1 tablet by mouth 4 times daily    ONDANSETRON (ZOFRAN-ODT) 4 MG DISINTEGRATING TABLET    Take 1 tablet by mouth 3 times daily as needed for Nausea or Vomiting     ED Provider Disposition Time  DISPOSITION  11:05 PM        Comment: Please note this report has been produced using speech recognition software and may contain errors related to that system including errors in grammar, punctuation, and spelling, as well as words and phrases that may be inappropriate. Efforts were made to edit the dictations.         [employer],   02/22/23 2563

## 2023-05-26 DIAGNOSIS — I10 ESSENTIAL HYPERTENSION: ICD-10-CM

## 2023-05-26 RX ORDER — AMLODIPINE BESYLATE AND BENAZEPRIL HYDROCHLORIDE 10; 40 MG/1; MG/1
1 CAPSULE ORAL DAILY
Qty: 180 CAPSULE | Refills: 3 | Status: SHIPPED | OUTPATIENT
Start: 2023-05-26

## 2023-05-26 NOTE — TELEPHONE ENCOUNTER
Patient called she is out of this medication   Has been a few days appt 6/23 would like sent to [address] [facility]

## 2023-06-15 LAB
ALBUMIN SERPL-MCNC: 4.4 G/DL
ALP BLD-CCNC: 86 U/L
ALT SERPL-CCNC: 32 U/L
ANION GAP SERPL CALCULATED.3IONS-SCNC: NORMAL MMOL/L
AST SERPL-CCNC: 23 U/L
BASOPHILS ABSOLUTE: 0 /ΜL
BASOPHILS RELATIVE PERCENT: 0.3 %
BILIRUB SERPL-MCNC: 0.3 MG/DL (ref 0.1–1.4)
BUN BLDV-MCNC: 14 MG/DL
CALCIUM SERPL-MCNC: 10.1 MG/DL
CHLORIDE BLD-SCNC: 100 MMOL/L
CHOLESTEROL, TOTAL: 127 MG/DL
CHOLESTEROL/HDL RATIO: NORMAL
CO2: 27 MMOL/L
CREAT SERPL-MCNC: 0.9 MG/DL
EGFR: NORMAL
EOSINOPHILS ABSOLUTE: 0.2 /ΜL
EOSINOPHILS RELATIVE PERCENT: 2.2 %
GLUCOSE BLD-MCNC: 151 MG/DL
HCT VFR BLD CALC: 44.1 % (ref 36–46)
HDLC SERPL-MCNC: 49 MG/DL (ref 35–70)
HEMOGLOBIN: 14.9 G/DL (ref 12–16)
LDL CHOLESTEROL CALCULATED: 51 MG/DL (ref 0–160)
LYMPHOCYTES ABSOLUTE: 1.9 /ΜL
LYMPHOCYTES RELATIVE PERCENT: 25.5 %
MCH RBC QN AUTO: 27 PG
MCHC RBC AUTO-ENTMCNC: 33.8 G/DL
MCV RBC AUTO: 80 FL
MONOCYTES ABSOLUTE: 0.4 /ΜL
MONOCYTES RELATIVE PERCENT: 5.5 %
NEUTROPHILS ABSOLUTE: 4.9 /ΜL
NEUTROPHILS RELATIVE PERCENT: 66.1 %
NONHDLC SERPL-MCNC: NORMAL MG/DL
PLATELET # BLD: 252 K/ΜL
PMV BLD AUTO: 10.3 FL
POTASSIUM SERPL-SCNC: 4.2 MMOL/L
RBC # BLD: 5.51 10^6/ΜL
SODIUM BLD-SCNC: 138 MMOL/L
TOTAL PROTEIN: 7.3
TRIGL SERPL-MCNC: 133 MG/DL
VLDLC SERPL CALC-MCNC: 27 MG/DL
WBC # BLD: 7.4 10^3/ML

## 2023-06-19 ENCOUNTER — OFFICE VISIT (OUTPATIENT)
Dept: CARDIOLOGY CLINIC | Age: 63
End: 2023-06-19
Payer: MEDICAID

## 2023-06-19 VITALS
SYSTOLIC BLOOD PRESSURE: 138 MMHG | HEIGHT: 68 IN | OXYGEN SATURATION: 95 % | DIASTOLIC BLOOD PRESSURE: 70 MMHG | WEIGHT: 274 LBS | HEART RATE: 66 BPM | BODY MASS INDEX: 41.52 KG/M2

## 2023-06-19 DIAGNOSIS — E78.5 DYSLIPIDEMIA: ICD-10-CM

## 2023-06-19 DIAGNOSIS — I73.9 CLAUDICATION (HCC): ICD-10-CM

## 2023-06-19 DIAGNOSIS — E11.9 TYPE 2 DIABETES MELLITUS WITHOUT COMPLICATION, WITHOUT LONG-TERM CURRENT USE OF INSULIN (HCC): ICD-10-CM

## 2023-06-19 DIAGNOSIS — I42.9 CARDIOMYOPATHY, UNSPECIFIED TYPE (HCC): ICD-10-CM

## 2023-06-19 DIAGNOSIS — I10 ESSENTIAL HYPERTENSION: Primary | ICD-10-CM

## 2023-06-19 PROCEDURE — 3078F DIAST BP <80 MM HG: CPT | Performed by: NURSE PRACTITIONER

## 2023-06-19 PROCEDURE — 99214 OFFICE O/P EST MOD 30 MIN: CPT | Performed by: NURSE PRACTITIONER

## 2023-06-19 PROCEDURE — 3075F SYST BP GE 130 - 139MM HG: CPT | Performed by: NURSE PRACTITIONER

## 2023-06-19 ASSESSMENT — ENCOUNTER SYMPTOMS
COUGH: 0
SHORTNESS OF BREATH: 1

## 2023-06-19 NOTE — PROGRESS NOTES
CARDIOLOGY  NOTE    2023    Jensen Mckenna (:  1960) is a 61 y.o. female,Established patient, here for evaluation of the following chief complaint(s):  6 Month Follow-Up (Pt states same chest pain , dizziness , pt states no other cardiac sx)        SUBJECTIVE/OBJECTIVE:    HPI  Patient is here to follow up on her HTN, dyslipidemia, and chest pain. She states that the chest pain is the same. Denies any new issues. She had some abdominal pain that sent her to the ED but was Viral. Patient feels that her shortness of breath is normal for her from her COPD. She does not exercise. She denies issues getting or taking her medications. Denies palpations, dizziness, orthopnea, swelling of the lower legs, or syncope. Review of Systems   Constitutional:  Negative for fatigue and fever. Respiratory:  Positive for shortness of breath. Negative for cough. Cardiovascular:  Positive for chest pain (gone as fast as it started). Negative for palpitations and leg swelling. Musculoskeletal:  Negative for arthralgias and gait problem. Neurological:  Negative for dizziness, syncope, weakness, light-headedness and headaches. Vitals:    23 1337   BP: 138/70   Site: Right Upper Arm   Position: Sitting   Cuff Size: Medium Adult   Pulse: 66   SpO2: 95%   Weight: 274 lb (124.3 kg)   Height: 5' 8\" (1.727 m)       Wt Readings from Last 3 Encounters:   23 274 lb (124.3 kg)   23 277 lb (125.6 kg)   23 277 lb (125.6 kg)       BP Readings from Last 3 Encounters:   23 138/70   23 132/74   22 128/76       Prior to Admission medications    Medication Sig Start Date End Date Taking?  Authorizing Provider   amLODIPine-benazepril (LOTREL) 10-40 MG per capsule Take 1 capsule by mouth daily 23  Yes JOSE Baker - CNP   ondansetron (ZOFRAN-ODT) 4 MG disintegrating tablet Take 1 tablet by mouth 3 times daily as needed for Nausea or Vomiting 23  Yes Mally Nj

## 2023-06-19 NOTE — PATIENT INSTRUCTIONS
Please be informed that if you contact our office outside of normal business hours the physician on call cannot help with any scheduling or rescheduling issues, procedure instruction questions or any type of medication issue. We advise you for any urgent/emergency that you go to the nearest emergency room! PLEASE CALL OUR OFFICE DURING NORMAL BUSINESS HOURS    Monday - Friday   8 am to 5 pm    [address]: [phone]    [place]:  [address] Laboratory Locations - No appointment necessary. Sites open Monday to Friday. Call your preferred location for test preparation, business   hours and other information you need. [employer] accepts [employer]. [address] Ignacia Curry, [address] [facility]  Phone: [phone]     **It is YOUR responsibilty to bring medication bottles and/or updated medication list to [address] [place] [address]. This will allow us to better serve you and all your healthcare needs**  Thank you for allowing us to care for you today! We want to ensure we can follow your treatment plan and we strive to give you the best outcomes and experience possible. If you ever have a life threatening emergency and call 911 - for an ambulance (EMS)   Our providers can only care for you at:   [facility] or [facility] ED. Even if you have someone take you or you drive yourself we can only care for you in a [facility] facility. Our providers are not setup at the other healthcare locations!

## 2023-07-06 DIAGNOSIS — I10 ESSENTIAL HYPERTENSION: ICD-10-CM

## 2023-07-06 RX ORDER — ATORVASTATIN CALCIUM 40 MG/1
40 TABLET, FILM COATED ORAL DAILY
Qty: 90 TABLET | Refills: 3 | Status: SHIPPED | OUTPATIENT
Start: 2023-07-06

## 2023-07-06 RX ORDER — AMLODIPINE BESYLATE AND BENAZEPRIL HYDROCHLORIDE 10; 40 MG/1; MG/1
1 CAPSULE ORAL DAILY
Qty: 90 CAPSULE | Refills: 3 | Status: SHIPPED | OUTPATIENT
Start: 2023-07-06

## 2023-10-24 ENCOUNTER — HOSPITAL ENCOUNTER (OUTPATIENT)
Dept: PHYSICAL THERAPY | Age: 63
Setting detail: THERAPIES SERIES
Discharge: HOME | End: 2023-10-24
Payer: MEDICAID

## 2023-10-24 PROCEDURE — 97162 PT EVAL MOD COMPLEX 30 MIN: CPT

## 2023-10-24 PROCEDURE — 97110 THERAPEUTIC EXERCISES: CPT

## 2023-10-24 ASSESSMENT — PAIN DESCRIPTION - LOCATION: LOCATION: BACK

## 2023-10-24 ASSESSMENT — PAIN DESCRIPTION - PAIN TYPE: TYPE: CHRONIC PAIN

## 2023-10-24 ASSESSMENT — PAIN DESCRIPTION - DESCRIPTORS: DESCRIPTORS: ACHING;SHOOTING

## 2023-10-24 ASSESSMENT — PAIN SCALES - GENERAL: PAINLEVEL_OUTOF10: 8

## 2023-10-24 ASSESSMENT — PAIN DESCRIPTION - ORIENTATION: ORIENTATION: RIGHT;LEFT

## 2023-10-24 NOTE — PLAN OF CARE
understands rational for treatment progression. Body Structures, Functions, Activity Limitations Requiring Skilled Therapeutic Intervention: Decreased functional mobility , Decreased ROM, Decreased strength, Decreased endurance, Increased pain    Statement of Medical Necessity: Physical Therapy is both indicated and medically necessary as outlined in the POC to increase the likelihood of meeting the functionally related goals stated below. Patient's Activity Tolerance: Patient tolerated evaluation without incident      Patient's rehabilitation potential/prognosis is considered to be: Fair    Factors which may impact rehabilitation potential include: Lack of motivation, Chronicity of problem  Measures taken to address barrier(s): N/A     GOALS   Patient Goal(s): improve pain. Short Term Goals Completed by Defer to Long Term Goals Goal Status                                                                   Long Term Goals Completed by 4 weeks 11/24/23 Goal Status          Long Term Goals: 6 weeks   Long Term Goal 1: Pt will demo I with HEP/symptom management. Long Term Goal 2: Pt will demo improved  gait mechanics with min/no deficits to ease community mobility. Long Term Goal 3: Pt will demo able to stand with min to no UE use to demo improved LE strength. Long Term Goal 4: Pt will completed TUG <20 sec to demo improved mobility. Long Term Goal 5: Pt will demo <60% disability improvement per Oswestry to demo improved functional mobility. Long Term Goal 6: Pt will demo 30 sec sit to stand  >3 reps to demo improved LE strength and ease with transfers.                                                             TREATMENT PLAN       Requires PT Follow-Up: Yes    Pt. actively involved in establishing Plan of Care and Goals: Yes  Patient/ Caregiver education and instruction: Goals, PT Role, Plan of Care, Evaluative findings, Home Exercise Program             Treatment may include any combination of the

## 2023-10-24 NOTE — PROGRESS NOTES
Physical Therapy: Initial Evaluation    Patient: Muna Lara (19 y.o. female)   Examination Date:   Plan of Care Certification Period: 10/24/2023 to        :  1960 ;    Confirmed: Yes MRN: [id number]  CSN: [id number]   Insurance: Payor: MEDICAID [place] / Plan: Ann Lalit DEPT OF JOB / Product Type: *No Product type* /   Insurance ID: [id number] - (Medicaid) Secondary Insurance (if applicable):    Referring Physician: JOSE Merritt - VALDEMAR     PCP: Harvinder Henao MD Visits to Date/Visits Approved:   /      No Show/Cancelled Appts:   /       Medical Diagnosis: Low back pain, unspecified [M54.50]  Type 2 diabetes mellitus with diabetic neuropathy, unspecified [E11.40]  Pain in left shoulder [M25.512]  Pain in right shoulder [M25.511]    Treatment Diagnosis: LBP, poor functional mobility/transfers, Core/ LE weakness     PERTINENT MEDICAL HISTORY   Patient Assessed for Rehabilitation Services: Yes  Self reported health status[de-identified] Good    Medical History: Chart Reviewed: Yes   Past Medical History:   Diagnosis Date    Arthritis     Asthma     Bipolar 1 disorder ([address])     COPD (chronic obstructive pulmonary disease) ([address])     Depression     Diabetes mellitus ([address])     Edema     Glaucoma     History of Doppler ultrasound 2018    Arterial-bilateral VERONICA's show normal arterial flow. No signif occlusive arterial disease.     History of nuclear stress test 2018    cardiolite-EF45%,normal    Hx of Doppler echocardiogram 2018    EF50-60%,no valvular disease    Hyperlipidemia     Hypertension     IBS (irritable bowel syndrome)      Surgical History:   Past Surgical History:   Procedure Laterality Date    TUBAL LIGATION         Medications:   Current Outpatient Medications:     atorvastatin (LIPITOR) 40 MG tablet, Take 1 tablet by mouth daily, Disp: 90 tablet, Rfl: 3    amLODIPine-benazepril (LOTREL) 10-40 MG per capsule, Take 1 capsule by mouth daily, Disp: 90 capsule, Rfl: 3

## 2023-11-01 ENCOUNTER — HOSPITAL ENCOUNTER (OUTPATIENT)
Dept: PHYSICAL THERAPY | Age: 63
Setting detail: THERAPIES SERIES
Discharge: HOME | End: 2023-11-01

## 2023-11-01 NOTE — FLOWSHEET NOTE
Physical Therapy  Cancellation/No-show Note  Patient Name:  Marcy Craig  :  1960   Date:  2023  Cancelled visits to date: 0  No-shows to date: 1    For today's appointment patient:  []  Cancelled  []  Rescheduled appointment  [x]  No-show     Reason given by patient:  []  Patient ill  []  Conflicting appointment  []  No transportation    []  Conflict with work  []  No reason given  [x]  Other:     Comments:  contacted patient and reports not in personal calendar. Informed patient of next appt.      Electronically signed by:  Efraín Doss, NONI Mandujano PT, DPT, OCS      2023 6:04 PM

## 2023-11-08 ENCOUNTER — HOSPITAL ENCOUNTER (OUTPATIENT)
Dept: PHYSICAL THERAPY | Age: 63
Setting detail: THERAPIES SERIES
Discharge: HOME | End: 2023-11-08

## 2023-11-08 NOTE — FLOWSHEET NOTE
Physical Therapy  Cancellation/No-show Note  Patient Name:  Robinson Harrell  :  1960   Date:  2023  Cancelled visits to date: 0  No-shows to date: 2    For today's appointment patient:  []  Cancelled  []  Rescheduled appointment  [x]  No-show     Reason given by patient:  []  Patient ill  []  Conflicting appointment  []  No transportation    []  Conflict with work  []  No reason given  [x]  Other:     Comments:     Electronically signed by:  NONI Motley PT, DPT, OCS      2023 5:57 PM

## 2023-12-20 ENCOUNTER — OFFICE VISIT (OUTPATIENT)
Dept: CARDIOLOGY CLINIC | Age: 63
End: 2023-12-20

## 2023-12-20 VITALS
BODY MASS INDEX: 42.65 KG/M2 | HEIGHT: 68 IN | DIASTOLIC BLOOD PRESSURE: 84 MMHG | HEART RATE: 91 BPM | OXYGEN SATURATION: 93 % | WEIGHT: 281.4 LBS | SYSTOLIC BLOOD PRESSURE: 138 MMHG

## 2023-12-20 DIAGNOSIS — I10 ESSENTIAL HYPERTENSION: Primary | ICD-10-CM

## 2023-12-20 DIAGNOSIS — I73.9 CLAUDICATION (HCC): ICD-10-CM

## 2023-12-20 DIAGNOSIS — E78.5 DYSLIPIDEMIA: ICD-10-CM

## 2023-12-20 DIAGNOSIS — I42.9 CARDIOMYOPATHY, UNSPECIFIED TYPE (HCC): ICD-10-CM

## 2023-12-20 DIAGNOSIS — E11.9 TYPE 2 DIABETES MELLITUS WITHOUT COMPLICATION, WITHOUT LONG-TERM CURRENT USE OF INSULIN (HCC): ICD-10-CM

## 2023-12-20 NOTE — PROGRESS NOTES
CARDIOLOGY  NOTE    2023    Omari Infante (:  1960) is a 61 y.o. female,Established patient, here for evaluation of the following chief complaint(s):  6 Month Follow-Up (Pt denies any new cardiac sx.)        SUBJECTIVE/OBJECTIVE:    HPI  Patient is here to follow up on her HTN, dyslipidemia, and chest pain. She states that the chest pain is the same. Denies any new issues. She had some abdominal pain that sent her to the ED but was Viral. Patient feels that her shortness of breath is normal for her from her COPD. She does not exercise. She denies issues getting or taking her medications. Denies palpations, dizziness, orthopnea, swelling of the lower legs, or syncope. Review of Systems   Constitutional:  Negative for fatigue and fever. Respiratory:  Positive for shortness of breath. Negative for cough. Cardiovascular:  Negative for chest pain, palpitations and leg swelling. Musculoskeletal:  Negative for arthralgias and gait problem. Neurological:  Negative for dizziness, syncope, weakness, light-headedness and headaches. Vitals:    23 1325   BP: 138/84   Site: Left Upper Arm   Position: Sitting   Cuff Size: Medium Adult   Pulse: 91   SpO2: 93%   Weight: 127.6 kg (281 lb 6.4 oz)   Height: 1.727 m (5' 8\")       Wt Readings from Last 3 Encounters:   23 127.6 kg (281 lb 6.4 oz)   23 124.3 kg (274 lb)   23 125.6 kg (277 lb)       BP Readings from Last 3 Encounters:   23 138/84   23 138/70   23 132/74       Prior to Admission medications    Medication Sig Start Date End Date Taking?  Authorizing Provider   atorvastatin (LIPITOR) 40 MG tablet Take 1 tablet by mouth daily 23  Yes Alexandru Delgado MD   amLODIPine-benazepril (LOTREL) 10-40 MG per capsule Take 1 capsule by mouth daily 23  Yes Alexandru Delgado MD   SITagliptin Phosphate (JANUVIA PO) Take by mouth   Yes ProviderShiloh MD   dicyclomine (BENTYL) 20 MG tablet Take

## 2023-12-26 ENCOUNTER — APPOINTMENT (OUTPATIENT)
Dept: GENERAL RADIOLOGY | Age: 63
End: 2023-12-26
Payer: MEDICAID

## 2023-12-26 ENCOUNTER — HOSPITAL ENCOUNTER (EMERGENCY)
Age: 63
Discharge: HOME | End: 2023-12-26
Payer: MEDICAID

## 2023-12-26 VITALS
RESPIRATION RATE: 18 BRPM | DIASTOLIC BLOOD PRESSURE: 95 MMHG | OXYGEN SATURATION: 94 % | TEMPERATURE: 97.9 F | SYSTOLIC BLOOD PRESSURE: 190 MMHG | HEART RATE: 74 BPM
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
## 2023-12-26 DIAGNOSIS — M79.602 PAIN OF LEFT UPPER EXTREMITY: Primary | ICD-10-CM

## 2023-12-26 PROCEDURE — 99283 EMERGENCY DEPT VISIT LOW MDM: CPT

## 2023-12-26 PROCEDURE — 73060 X-RAY EXAM OF HUMERUS: CPT

## 2024-01-17 ENCOUNTER — TELEPHONE (OUTPATIENT)
Dept: CARDIOLOGY CLINIC | Age: 64
End: 2024-01-17

## 2024-01-17 NOTE — TELEPHONE ENCOUNTER
Left Ventricle: Hyperdynamic left ventricular systolic function with a visually estimated EF of 60 - 65%. Left ventricle size is normal. Moderately increased wall thickness. Normal wall motion. Grade I diastolic dysfunction with normal LAP.    No significant valvular disease noted.    Mitral Valve: Mild regurgitation.    Pericardium: No pericardial effusion.    Image quality is adequate. Technically difficult study due to patient's body habitus.   spoke with pt regarding echo , pt voiced understanding.

## 2024-01-17 NOTE — TELEPHONE ENCOUNTER
----- Message from JOSE Rosenthal CNP sent at 1/17/2024  9:17 AM EST -----    ----- Message -----  From: Gigi Feliz MD  Sent: 1/16/2024   6:06 PM EST  To: JOSE Rosenthal CNP

## 2024-03-12 ENCOUNTER — HOSPITAL ENCOUNTER (EMERGENCY)
Age: 64
Discharge: HOME | End: 2024-03-12
Payer: MEDICAID

## 2024-03-12 VITALS
RESPIRATION RATE: 18 BRPM | BODY MASS INDEX: 41.66 KG/M2 | WEIGHT: 274 LBS | OXYGEN SATURATION: 96 % | TEMPERATURE: 98.4 F | HEART RATE: 81 BPM | DIASTOLIC BLOOD PRESSURE: 76 MMHG | SYSTOLIC BLOOD PRESSURE: 142 MMHG

## 2024-03-12 DIAGNOSIS — Z87.39 HISTORY OF GOUT: ICD-10-CM

## 2024-03-12 DIAGNOSIS — M79.672 LEFT FOOT PAIN: Primary | ICD-10-CM

## 2024-03-12 PROCEDURE — 99283 EMERGENCY DEPT VISIT LOW MDM: CPT

## 2024-03-12 PROCEDURE — 6370000000 HC RX 637 (ALT 250 FOR IP): Performed by: PHYSICIAN ASSISTANT

## 2024-03-12 RX ORDER — OXYCODONE HYDROCHLORIDE 5 MG/1
5 TABLET ORAL ONCE
Status: COMPLETED | OUTPATIENT
Start: 2024-03-12 | End: 2024-03-12

## 2024-03-12 RX ORDER — PREDNISONE 20 MG/1
40 TABLET ORAL ONCE
Status: COMPLETED | OUTPATIENT
Start: 2024-03-12 | End: 2024-03-12

## 2024-03-12 RX ORDER — PREDNISONE 20 MG/1
40 TABLET ORAL DAILY
Qty: 10 TABLET | Refills: 0 | Status: SHIPPED | OUTPATIENT
Start: 2024-03-12 | End: 2024-03-17

## 2024-03-12 RX ORDER — DOCUSATE SODIUM 100 MG/1
100 CAPSULE, LIQUID FILLED ORAL 2 TIMES DAILY
Qty: 10 CAPSULE | Refills: 0 | Status: SHIPPED | OUTPATIENT
Start: 2024-03-12 | End: 2024-03-17

## 2024-03-12 RX ORDER — OXYCODONE HYDROCHLORIDE 5 MG/1
5 TABLET ORAL EVERY 6 HOURS PRN
Qty: 8 TABLET | Refills: 0 | Status: SHIPPED | OUTPATIENT
Start: 2024-03-12 | End: 2024-03-14

## 2024-03-12 RX ADMIN — PREDNISONE 40 MG: 20 TABLET ORAL at 09:11

## 2024-03-12 RX ADMIN — OXYCODONE HYDROCHLORIDE 5 MG: 5 TABLET ORAL at 09:11

## 2024-03-12 ASSESSMENT — PAIN - FUNCTIONAL ASSESSMENT: PAIN_FUNCTIONAL_ASSESSMENT: 0-10

## 2024-03-12 ASSESSMENT — ENCOUNTER SYMPTOMS
SHORTNESS OF BREATH: 0
BACK PAIN: 0

## 2024-03-12 ASSESSMENT — PAIN DESCRIPTION - LOCATION: LOCATION: FOOT

## 2024-03-12 ASSESSMENT — PAIN SCALES - GENERAL
PAINLEVEL_OUTOF10: 9
PAINLEVEL_OUTOF10: 8

## 2024-03-12 ASSESSMENT — PAIN DESCRIPTION - ORIENTATION: ORIENTATION: LEFT

## 2024-03-12 NOTE — ED PROVIDER NOTES
Ultrasound and MRI are read by the radiologist. I, Alvin Ramos PA-C have directly visualized the radiologic plain film image(s) with the below findings:    Interpretation per the Radiologist below, if available at the time of this note:    No orders to display        No results found.       PROCEDURES:   Procedures      Patient was given thefollowing medications:  Medications   oxyCODONE (ROXICODONE) immediate release tablet 5 mg (5 mg Oral Given 3/12/24 0911)   predniSONE (DELTASONE) tablet 40 mg (40 mg Oral Given 3/12/24 0911)           HPI SUMMARY, DDX,  REASSESSMENT, MEDICAL DECISION MAKING :     Medical Decision Making and ED Plan    This patient presents with above HPI.     Based on the initial medical symptoms and obtained history,  I  considered the following  Differential Diagnoses:   Gouty arthritis, tendinitis, occult injury, stress fracture, ligamentous injury, soft tissue infection, also considered but low suspicion for ischemia, PAD, septic arthritis, DVT, SVT, lumbar radiculopathy     Imaging reviewed.  If ordered, I independently reviewed imaging. However please refer to final radiologist's report for definitive impression and findings.     I Reassessed the patient  with the finding that pt has been stable.     Discussion with Other Professionals : As indicated in SUMMARY, ED COURSE AND REASSESSMENT, MDM    External Records Reviewed : Outpatient Labs & Studies CMP from February 2023, normal renal function uric acid from May 2022, 6.6, 2013 9.6    PDMP- no active prescription for controlled medications, or concerning history for potential abuse    Escalation of care, including admission/OBS considered : Appropriate for outpatient management.    Disposition Considerations (tests considered but not done, Shared Decision Making, Pt Expectation of Test or Tx.): I considered X-rays foot and ankle x-ray, offered CMP, uric acid, CBC, patient declined         MDM:   History, initial exam , and

## 2024-03-12 NOTE — DISCHARGE INSTRUCTIONS
As we discussed, your pain may be caused by gout, but there may be also other causes.  Please take the medications provided to you today that would help with gout pain.      Follow-up with your family doctor this week to have a reevaluation and if needed discussed a further management.  Talk to them about your prescription for allopurinol and when to restart it.    Return to emergency department if you have any persisting for worsening foot swelling, redness, fever, red streaks of the wound, leg swelling, chest pain, shortness of breath, worsening symptoms or any new concerns.

## 2024-04-30 ENCOUNTER — APPOINTMENT (OUTPATIENT)
Dept: GENERAL RADIOLOGY | Age: 64
End: 2024-04-30
Payer: MEDICAID

## 2024-04-30 ENCOUNTER — HOSPITAL ENCOUNTER (EMERGENCY)
Age: 64
Discharge: HOME | End: 2024-04-30
Payer: MEDICAID

## 2024-04-30 VITALS
TEMPERATURE: 98.2 F | HEART RATE: 84 BPM | RESPIRATION RATE: 14 BRPM | DIASTOLIC BLOOD PRESSURE: 80 MMHG | SYSTOLIC BLOOD PRESSURE: 147 MMHG | OXYGEN SATURATION: 95 %

## 2024-04-30 DIAGNOSIS — S63.502A SPRAIN OF LEFT WRIST, INITIAL ENCOUNTER: ICD-10-CM

## 2024-04-30 DIAGNOSIS — M25.462 EFFUSION OF LEFT KNEE: ICD-10-CM

## 2024-04-30 DIAGNOSIS — M17.0 OSTEOARTHRITIS OF BOTH KNEES, UNSPECIFIED OSTEOARTHRITIS TYPE: ICD-10-CM

## 2024-04-30 DIAGNOSIS — S83.90XA SPRAIN OF KNEE, UNSPECIFIED LATERALITY, UNSPECIFIED LIGAMENT, INITIAL ENCOUNTER: ICD-10-CM
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
## 2024-04-30 PROCEDURE — 99283 EMERGENCY DEPT VISIT LOW MDM: CPT

## 2024-04-30 PROCEDURE — 73564 X-RAY EXAM KNEE 4 OR MORE: CPT

## 2024-04-30 PROCEDURE — 6370000000 HC RX 637 (ALT 250 FOR IP)

## 2024-04-30 PROCEDURE — 73110 X-RAY EXAM OF WRIST: CPT

## 2024-04-30 RX ORDER — ACETAMINOPHEN 325 MG/1
650 TABLET ORAL EVERY 6 HOURS PRN
Qty: 40 TABLET | Refills: 0 | Status: SHIPPED | OUTPATIENT
Start: 2024-04-30

## 2024-04-30 RX ORDER — ACETAMINOPHEN 500 MG
1000 TABLET ORAL ONCE
Status: COMPLETED | OUTPATIENT
Start: 2024-04-30 | End: 2024-04-30

## 2024-04-30 RX ORDER — METHOCARBAMOL 750 MG/1
750 TABLET, FILM COATED ORAL 4 TIMES DAILY
Qty: 40 TABLET | Refills: 0 | Status: SHIPPED | OUTPATIENT
Start: 2024-04-30 | End: 2024-05-10

## 2024-04-30 RX ADMIN — ACETAMINOPHEN 1000 MG: 500 TABLET ORAL at 15:00

## 2024-04-30 ASSESSMENT — PAIN DESCRIPTION - LOCATION: LOCATION: KNEE

## 2024-04-30 ASSESSMENT — PAIN DESCRIPTION - ORIENTATION: ORIENTATION: LEFT

## 2024-04-30 ASSESSMENT — PAIN SCALES - GENERAL: PAINLEVEL_OUTOF10: 8

## 2024-04-30 NOTE — ED PROVIDER NOTES
nausea or vomiting. Patient denies any difficulty with range of motion, any difficulty with ambulation only minor pain, denies any redness or swelling to the joint.  Denies any recent fevers illnesses or infections.  Denies any other injuries.  Denies history of injury to this area before.  Denies history of IV drug use since surgeries.  Denies any chest pain abdominal pain or shortness of breath    Patient in no acute distress.  Able to talk in complete sentences.  Able to ambulate without any difficulty.  Vital signs are stable.  Patient is afebrile.  Cardiac exam without murmur.  Left right knee and left wrist has full range of motion, minimal point edema and tenderness is located to bilateral knees and the anterior aspect around the patella, left knee with effusion noted, left wrist minimal tenderness noted to medial aspect no erythema or edema noted.. pulses are equal and regular bilateral upper and lower extremities.  Cap refill less then 2 patient has sensation and strength of the injured area no erythema noted.  No lower extremity edema noted.  Lung exam clear bilaterally without adventitious sounds noted.  Abdominal exam without tenderness masses without CVA tenderness or suprapubic tenderness noted.    Low suspicion of septic joint, patient vital signs are stable, no indicators, joint has full range of motion with erythema noted.  Swelling does not cross joints.    X-ray negative for acute fracture or dislocation, effusion osteoarthritis was noted on knee x-ray.    Pain of the area consistent with patient's injuries.  Provided patient with Tylenol.  Educated patient on need for orthopedic evaluation for increased or continued pain.  Educated on strict return precautions for redness warmth fever systemic signs of illness or increased pain to injury location.    Provided with Ace wrap for left knee wrist brace for left wrist.  Patient has a cane at bedside.  Educated patient on RICE use of Tylenol Robaxin

## 2024-05-14 RX ORDER — ATORVASTATIN CALCIUM 40 MG/1
40 TABLET, FILM COATED ORAL DAILY
Qty: 90 TABLET | Refills: 3 | OUTPATIENT
Start: 2024-05-14

## 2024-05-14 RX ORDER — ATORVASTATIN CALCIUM 40 MG/1
40 TABLET, FILM COATED ORAL DAILY
Qty: 90 TABLET | Refills: 3 | Status: SHIPPED | OUTPATIENT
Start: 2024-05-14

## 2024-05-15 ENCOUNTER — OFFICE VISIT (OUTPATIENT)
Dept: ORTHOPEDIC SURGERY | Age: 64
End: 2024-05-15
Payer: MEDICAID

## 2024-05-15 VITALS — DIASTOLIC BLOOD PRESSURE: 88 MMHG | OXYGEN SATURATION: 98 % | SYSTOLIC BLOOD PRESSURE: 148 MMHG | HEART RATE: 78 BPM

## 2024-05-15 DIAGNOSIS — M17.0 BILATERAL PRIMARY OSTEOARTHRITIS OF KNEE: Primary | ICD-10-CM

## 2024-05-15 DIAGNOSIS — M25.532 LEFT WRIST PAIN: ICD-10-CM

## 2024-05-15 PROCEDURE — 3077F SYST BP >= 140 MM HG: CPT | Performed by: STUDENT IN AN ORGANIZED HEALTH CARE EDUCATION/TRAINING PROGRAM

## 2024-05-15 PROCEDURE — 99204 OFFICE O/P NEW MOD 45 MIN: CPT | Performed by: STUDENT IN AN ORGANIZED HEALTH CARE EDUCATION/TRAINING PROGRAM

## 2024-05-15 PROCEDURE — 3079F DIAST BP 80-89 MM HG: CPT | Performed by: STUDENT IN AN ORGANIZED HEALTH CARE EDUCATION/TRAINING PROGRAM

## 2024-05-15 ASSESSMENT — ENCOUNTER SYMPTOMS
BACK PAIN: 0
VOMITING: 0
NAUSEA: 0
SHORTNESS OF BREATH: 0
VOICE CHANGE: 0

## 2024-05-15 NOTE — PROGRESS NOTES
5/15/2024   Chief Complaint   Patient presents with    ED Follow-up     Left wrist         History of Present Illness:                             Casie Menendez is a 64 y.o. female  referred by ER for evaluation and treatment of bilateral knee pain as well as left wrist injury.  Patient states she has chronic history of knee pain although mild.  She states that she had a fall approximately 2 weeks ago and was seen in the emergency room after this injury.  She states that she was told that she had no fracture but significant arthritic changes at both of her knees as well as a left wrist sprain.  She states over the last several weeks she is much improved but still want to be evaluated.  No additional complaints this time.  No interventions to either area.  This is my first time seeing the patient.  No advanced imaging thus far    The pain's location is joint line of bilateral knees but no pain at the rest of the. she describes the symptoms as aching, sharp and stabbing. Symptoms improve with rest. Symptoms worsen with deep knee bending, getting up from a chair, weight bearing, sitting for prolonged periods of time, twisting activities.     Patient denies prior injury to knee, denies numbness, tingling, fever, chills.    Denies swelling or effusions.  Intermittent prominent mechanical symptoms. Denies instability.  Worse with increased activity. Better with rest,     Treatment thus far has included rest, activity modifications, pain medication with mild relief.  Here today to discuss diagnosis and treatment options.      Is affecting ADLs.  Pain is 6/10 at it's worst.    No advanced imaging thus far    Outside reports reviewed: ER note and imaging reviewed.    Patient's medications, allergies, past medical, surgical, social and family histories were reviewed and updated as appropriate.      Medical History  Patient's medications, allergies, past medical, surgical, social and family histories were reviewed and

## 2024-05-15 NOTE — PROGRESS NOTES
ED Follow up for left wrist and bilateral knee pain.     Seen in ED 4/30/24 after she fell on her stairs. Xrays on left wrist and both knees were unremarkable aside from degenerative changes in both knees. Today she states she is feeling better. No pain today.

## 2024-06-05 ENCOUNTER — HOSPITAL ENCOUNTER (OUTPATIENT)
Age: 64
Discharge: HOME | End: 2024-06-05
Payer: MEDICAID

## 2024-06-05 LAB
ALBUMIN SERPL-MCNC: 4.4 GM/DL (ref 3.4–5)
ALP BLD-CCNC: 107 IU/L (ref 40–128)
ALT SERPL-CCNC: 31 U/L (ref 10–40)
ANION GAP SERPL CALCULATED.3IONS-SCNC: 15 MMOL/L (ref 7–16)
AST SERPL-CCNC: 25 IU/L (ref 15–37)
BASOPHILS ABSOLUTE: 0 K/CU MM
BASOPHILS RELATIVE PERCENT: 0.3 % (ref 0–1)
BILIRUB SERPL-MCNC: 0.2 MG/DL (ref 0–1)
BUN SERPL-MCNC: 15 MG/DL (ref 6–23)
CALCIUM SERPL-MCNC: 9.7 MG/DL (ref 8.3–10.6)
CHLORIDE BLD-SCNC: 105 MMOL/L (ref 99–110)
CHOLESTEROL, FASTING: 116 MG/DL
CO2: 24 MMOL/L (ref 21–32)
CREAT SERPL-MCNC: 0.9 MG/DL (ref 0.6–1.1)
DIFFERENTIAL TYPE: ABNORMAL
EOSINOPHILS ABSOLUTE: 0.2 K/CU MM
EOSINOPHILS RELATIVE PERCENT: 2.5 % (ref 0–3)
GFR, ESTIMATED: 71 ML/MIN/1.73M2
GLUCOSE SERPL-MCNC: 234 MG/DL (ref 70–99)
HCT VFR BLD CALC: 46.4 % (ref 37–47)
HDLC SERPL-MCNC: 42 MG/DL
HEMOGLOBIN: 14.5 GM/DL (ref 12.5–16)
IMMATURE NEUTROPHIL %: 0.3 % (ref 0–0.43)
LDLC SERPL CALC-MCNC: 31 MG/DL
LYMPHOCYTES ABSOLUTE: 1.8 K/CU MM
LYMPHOCYTES RELATIVE PERCENT: 27.7 % (ref 24–44)
MCH RBC QN AUTO: 27.2 PG (ref 27–31)
MCHC RBC AUTO-ENTMCNC: 31.3 % (ref 32–36)
MCV RBC AUTO: 86.9 FL (ref 78–100)
MONOCYTES ABSOLUTE: 0.4 K/CU MM
MONOCYTES RELATIVE PERCENT: 5.5 % (ref 0–4)
NEUTROPHILS ABSOLUTE: 4.1 K/CU MM
NEUTROPHILS RELATIVE PERCENT: 63.7 % (ref 36–66)
NUCLEATED RBC %: 0 %
PDW BLD-RTO: 14.8 % (ref 11.7–14.9)
PLATELET # BLD: 225 K/CU MM (ref 140–440)
PMV BLD AUTO: 10.5 FL (ref 7.5–11.1)
POTASSIUM SERPL-SCNC: 4.1 MMOL/L (ref 3.5–5.1)
RBC # BLD: 5.34 M/CU MM (ref 4.2–5.4)
SODIUM BLD-SCNC: 144 MMOL/L (ref 135–145)
TOTAL IMMATURE NEUTOROPHIL: 0.02 K/CU MM
TOTAL NUCLEATED RBC: 0 K/CU MM
TOTAL PROTEIN: 7.4 GM/DL (ref 6.4–8.2)
TRIGLYCERIDE, FASTING: 215 MG/DL
WBC # BLD: 6.5 K/CU MM (ref 4–10.5)

## 2024-06-05 PROCEDURE — 85025 COMPLETE CBC W/AUTO DIFF WBC: CPT

## 2024-06-05 PROCEDURE — 80053 COMPREHEN METABOLIC PANEL: CPT

## 2024-06-05 PROCEDURE — 36415 COLL VENOUS BLD VENIPUNCTURE: CPT

## 2024-06-05 PROCEDURE — 80061 LIPID PANEL: CPT

## 2024-06-13 DIAGNOSIS — I10 ESSENTIAL HYPERTENSION: ICD-10-CM

## 2024-06-14 RX ORDER — AMLODIPINE AND BENAZEPRIL HYDROCHLORIDE 10; 40 MG/1; MG/1
1 CAPSULE ORAL DAILY
Qty: 90 CAPSULE | Refills: 3 | Status: SHIPPED | OUTPATIENT
Start: 2024-06-14

## 2024-07-19 ENCOUNTER — HOSPITAL ENCOUNTER (OUTPATIENT)
Dept: WOUND CARE | Age: 64
Discharge: HOME | End: 2024-07-19
Payer: MEDICAID

## 2024-07-19 VITALS — RESPIRATION RATE: 16 BRPM

## 2024-07-19 DIAGNOSIS — J86.9 ABSCESS OF CHEST (HCC): Primary | ICD-10-CM

## 2024-07-19 PROCEDURE — 11042 DBRDMT SUBQ TIS 1ST 20SQCM/<: CPT

## 2024-07-19 PROCEDURE — 99213 OFFICE O/P EST LOW 20 MIN: CPT

## 2024-07-19 RX ORDER — CETIRIZINE HYDROCHLORIDE 10 MG/1
10 TABLET ORAL DAILY
COMMUNITY

## 2024-07-19 RX ORDER — TRIAMCINOLONE ACETONIDE 1 MG/G
OINTMENT TOPICAL ONCE
OUTPATIENT
Start: 2024-07-19 | End: 2024-07-19

## 2024-07-19 RX ORDER — BACITRACIN ZINC AND POLYMYXIN B SULFATE 500; 1000 [USP'U]/G; [USP'U]/G
OINTMENT TOPICAL ONCE
OUTPATIENT
Start: 2024-07-19 | End: 2024-07-19

## 2024-07-19 RX ORDER — IBUPROFEN 200 MG
TABLET ORAL ONCE
OUTPATIENT
Start: 2024-07-19 | End: 2024-07-19

## 2024-07-19 RX ORDER — GENTAMICIN SULFATE 1 MG/G
OINTMENT TOPICAL ONCE
OUTPATIENT
Start: 2024-07-19 | End: 2024-07-19

## 2024-07-19 RX ORDER — LIDOCAINE HYDROCHLORIDE 20 MG/ML
JELLY TOPICAL ONCE
OUTPATIENT
Start: 2024-07-19 | End: 2024-07-19

## 2024-07-19 RX ORDER — CLOBETASOL PROPIONATE 0.5 MG/G
OINTMENT TOPICAL ONCE
OUTPATIENT
Start: 2024-07-19 | End: 2024-07-19

## 2024-07-19 RX ORDER — SODIUM CHLOR/HYPOCHLOROUS ACID 0.033 %
SOLUTION, IRRIGATION IRRIGATION ONCE
OUTPATIENT
Start: 2024-07-19 | End: 2024-07-19

## 2024-07-19 RX ORDER — BETAMETHASONE DIPROPIONATE 0.5 MG/G
CREAM TOPICAL ONCE
OUTPATIENT
Start: 2024-07-19 | End: 2024-07-19

## 2024-07-19 RX ORDER — LIDOCAINE 40 MG/G
CREAM TOPICAL ONCE
OUTPATIENT
Start: 2024-07-19 | End: 2024-07-19

## 2024-07-19 RX ORDER — LIDOCAINE HYDROCHLORIDE 40 MG/ML
SOLUTION TOPICAL ONCE
OUTPATIENT
Start: 2024-07-19 | End: 2024-07-19

## 2024-07-19 RX ORDER — LIDOCAINE 50 MG/G
OINTMENT TOPICAL ONCE
OUTPATIENT
Start: 2024-07-19 | End: 2024-07-19

## 2024-07-19 RX ORDER — BACITRACIN ZINC 500 [USP'U]/G
OINTMENT TOPICAL ONCE
OUTPATIENT
Start: 2024-07-19 | End: 2024-07-19

## 2024-07-19 ASSESSMENT — PAIN DESCRIPTION - LOCATION
LOCATION: CHEST
LOCATION: ABDOMEN

## 2024-07-19 ASSESSMENT — PAIN DESCRIPTION - ORIENTATION: ORIENTATION: MID

## 2024-07-19 ASSESSMENT — PAIN DESCRIPTION - ONSET: ONSET: ON-GOING

## 2024-07-19 ASSESSMENT — PAIN DESCRIPTION - PAIN TYPE: TYPE: ACUTE PAIN

## 2024-07-19 ASSESSMENT — PAIN SCALES - GENERAL: PAINLEVEL_OUTOF10: 6

## 2024-07-19 ASSESSMENT — PAIN - FUNCTIONAL ASSESSMENT: PAIN_FUNCTIONAL_ASSESSMENT: PREVENTS OR INTERFERES SOME ACTIVE ACTIVITIES AND ADLS

## 2024-07-19 ASSESSMENT — PAIN DESCRIPTION - DESCRIPTORS: DESCRIPTORS: SORE

## 2024-07-19 ASSESSMENT — PAIN DESCRIPTION - FREQUENCY: FREQUENCY: INTERMITTENT

## 2024-07-19 NOTE — PROGRESS NOTES
Wound Care Center Initial Visit      Casie Menendez  AGE: 64 y.o.   GENDER: female  : 1960  EPISODE DATE:  2024   Referred by:self    Subjective:     CHIEF COMPLAINT chest wound     HISTORY of PRESENT ILLNESS      Casie Menendez is a 64 y.o. female who presents to the [facility]ound Clinic for an initial visit for evaluation and treatment of Acute  abscess   ulcer(s) of  chest.  The condition is of mild severity. The ulcer has been present for 1 weeks.  The underlying cause is thought to be abscess from moisture.  The patients care to date has included nothing so far. The patient has significant underlying medical conditions as below.     Wound Pain Timing/Severity: waxing and waning  Quality of pain: aching, burning  Severity of pain:  2 / 10   Modifying Factors: diabetes, obesity, and smoking  Associated Signs/Symptoms: edema, erythema, drainage, and pain        PAST MEDICAL HISTORY        Diagnosis Date    Arthritis     Asthma     Bipolar 1 disorder (HCC)     COPD (chronic obstructive pulmonary disease) (HCC)     Depression     Diabetes mellitus (HCC)     Edema     Glaucoma     History of Doppler ultrasound 2018    Arterial-bilateral VERONICA's show normal arterial flow. No signif occlusive arterial disease.    History of nuclear stress test 2018    cardiolite-EF45%,normal    Hx of Doppler echocardiogram 2018    EF50-60%,no valvular disease    Hyperlipidemia     Hypertension     IBS (irritable bowel syndrome)        PAST SURGICAL HISTORY    Past Surgical History:   Procedure Laterality Date    TUBAL LIGATION         FAMILY HISTORY    Family History   Problem Relation Age of Onset    Cancer Mother     Diabetes Mother     Heart Disease Mother     Cancer Father     High Blood Pressure Father     Cancer Other     Cancer Other     Diabetes Sister     Heart Disease Sister     High Blood Pressure Brother     Depression Sister        SOCIAL HISTORY    Social History     Tobacco Use    Smoking status:  Statement Selected

## 2024-07-19 NOTE — PATIENT INSTRUCTIONS
PHYSICIAN ORDERS AND DISCHARGE INSTRUCTIONS    Wound cleansing:     Do not scrub or use excessive force.   Wash hands with soap and water before and after dressing changes.   Prior to applying a clean dressing, cleanse wound with normal saline,               wound cleanser, or mild soap and water.    Ask the physician or nurse before getting the wound(s) wet in a shower      Wound Care Notes:           Orders for this week:  2024       Medial chest :Wash with soap and water. Pat dry.   Apply gentamicin, clobetasol   Pad with 4x4 gauze   Cover with gentac silicone border   Change 1 -2 times per day and as needed      Dispense 30 day quantity when ordering supplies.  Plan:       Nurse Visit:   Follow Up Instructions: At the Wound Care Center in 1 week   Primary Wound Care Provider: Bunny   Call  for any questions or concerns.  Central [phone] for imaging and lab work

## 2024-08-01 ENCOUNTER — HOSPITAL ENCOUNTER (OUTPATIENT)
Age: 64
Setting detail: SPECIMEN
Discharge: HOME | End: 2024-08-01
Payer: MEDICAID

## 2024-08-01 ENCOUNTER — OFFICE VISIT (OUTPATIENT)
Dept: OBGYN | Age: 64
End: 2024-08-01
Payer: MEDICAID

## 2024-08-01 DIAGNOSIS — Z78.0 ENCOUNTER FOR OSTEOPOROSIS SCREENING IN ASYMPTOMATIC POSTMENOPAUSAL PATIENT: ICD-10-CM

## 2024-08-01 DIAGNOSIS — Z01.419 ENCOUNTER FOR ANNUAL ROUTINE GYNECOLOGICAL EXAMINATION: Primary | ICD-10-CM

## 2024-08-01 DIAGNOSIS — N89.8 VAGINAL DISCHARGE: ICD-10-CM

## 2024-08-01 DIAGNOSIS — Z12.31 SCREENING MAMMOGRAM FOR BREAST CANCER: ICD-10-CM

## 2024-08-01 DIAGNOSIS — Z11.51 ENCOUNTER FOR SCREENING FOR HUMAN PAPILLOMAVIRUS (HPV): ICD-10-CM

## 2024-08-01 DIAGNOSIS — N74 FEMALE PELVIC INFLAMMATORY DISORDERS IN DISEASES CLASSIFIED ELSEWHERE: ICD-10-CM

## 2024-08-01 DIAGNOSIS — N76.0 VAGINITIS AND VULVOVAGINITIS: ICD-10-CM

## 2024-08-01 DIAGNOSIS — Z13.820 ENCOUNTER FOR OSTEOPOROSIS SCREENING IN ASYMPTOMATIC POSTMENOPAUSAL PATIENT: ICD-10-CM

## 2024-08-01 PROCEDURE — 99386 PREV VISIT NEW AGE 40-64: CPT | Performed by: OBSTETRICS & GYNECOLOGY

## 2024-08-01 PROCEDURE — 87624 HPV HI-RISK TYP POOLED RSLT: CPT

## 2024-08-01 SDOH — ECONOMIC STABILITY: FOOD INSECURITY: WITHIN THE PAST 12 MONTHS, YOU WORRIED THAT YOUR FOOD WOULD RUN OUT BEFORE YOU GOT MONEY TO BUY MORE.: NEVER TRUE

## 2024-08-01 SDOH — ECONOMIC STABILITY: FOOD INSECURITY: WITHIN THE PAST 12 MONTHS, THE FOOD YOU BOUGHT JUST DIDN'T LAST AND YOU DIDN'T HAVE MONEY TO GET MORE.: NEVER TRUE

## 2024-08-01 SDOH — ECONOMIC STABILITY: HOUSING INSECURITY
IN THE LAST 12 MONTHS, WAS THERE A TIME WHEN YOU DID NOT HAVE A STEADY PLACE TO SLEEP OR SLEPT IN A SHELTER (INCLUDING NOW)?: NO

## 2024-08-01 SDOH — ECONOMIC STABILITY: INCOME INSECURITY: HOW HARD IS IT FOR YOU TO PAY FOR THE VERY BASICS LIKE FOOD, HOUSING, MEDICAL CARE, AND HEATING?: NOT HARD AT ALL

## 2024-08-01 ASSESSMENT — PATIENT HEALTH QUESTIONNAIRE - PHQ9
SUM OF ALL RESPONSES TO PHQ QUESTIONS 1-9: 0
1. LITTLE INTEREST OR PLEASURE IN DOING THINGS: NOT AT ALL
SUM OF ALL RESPONSES TO PHQ QUESTIONS 1-9: 0
2. FEELING DOWN, DEPRESSED OR HOPELESS: NOT AT ALL
SUM OF ALL RESPONSES TO PHQ9 QUESTIONS 1 & 2: 0
SUM OF ALL RESPONSES TO PHQ QUESTIONS 1-9: 0
SUM OF ALL RESPONSES TO PHQ QUESTIONS 1-9: 0

## 2024-08-01 NOTE — PROGRESS NOTES
8/1/24    Casie Menendez  1960    Chief Complaint   Patient presents with    New Patient     Prev Pt, Annual exam, postmenopausal, no HRT, is sexually active, Last pap smear was 2019 normal , Date of last Mammogram: 1/4/2024 normal, no dexa on file, never had colonoscopy.     Vaginal Discharge     Pt c/o white thick discharge with vaginal itching and odor x 1 month.         Casie Menendez is a 64 y.o. female who presents today for evaluation of annual    Past Medical History:   Diagnosis Date    Arthritis     Asthma     Bipolar 1 disorder (HCC)     COPD (chronic obstructive pulmonary disease) (HCC)     Depression     Diabetes mellitus ([facility])     Edema     Glaucoma     History of Doppler ultrasound 03/26/2018    Arterial-bilateral VERONICA's show normal arterial flow. No signif occlusive arterial disease.    History of nuclear stress test 02/22/2018    cardiolite-EF45%,normal    Hx of Doppler echocardiogram 02/22/2018    EF50-60%,no valvular disease    Hyperlipidemia     Hypertension     IBS (irritable bowel syndrome)     Osteoporosis     Type 2 diabetes mellitus without complication ([facility])        Past Surgical History:   Procedure Laterality Date    TUBAL LIGATION         Social History     Tobacco Use    Smoking status: Former    Smokeless tobacco: Never   Vaping Use    Vaping Use: Never used   Substance Use Topics    Alcohol use: No    Drug use: No       Family History   Problem Relation Age of Onset    Cancer Mother     Diabetes Mother     Heart Disease Mother     Cancer Father     High Blood Pressure Father     Cancer Other     Cancer Other     Diabetes Sister     Heart Disease Sister     High Blood Pressure Brother     Depression Sister        Current Outpatient Medications   Medication Sig Dispense Refill    terconazole (TERAZOL 7) 0.4 % vaginal cream Place vaginally nightly. 45 g 0    nystatin-triamcinolone (MYCOLOG II) 465248-8.1 UNIT/GM-% cream Apply topically 2 times daily. 60 g 0    cetirizine (ZYRTEC) 10

## 2024-08-02 ENCOUNTER — HOSPITAL ENCOUNTER (OUTPATIENT)
Dept: WOUND CARE | Age: 64
Discharge: HOME | End: 2024-08-02
Payer: MEDICAID

## 2024-08-02 VITALS
TEMPERATURE: 97.3 F | SYSTOLIC BLOOD PRESSURE: 159 MMHG | RESPIRATION RATE: 16 BRPM | DIASTOLIC BLOOD PRESSURE: 77 MMHG | HEART RATE: 76 BPM

## 2024-08-02 DIAGNOSIS — J86.9 ABSCESS OF CHEST (HCC): Primary | ICD-10-CM

## 2024-08-02 LAB
CANDIDA DNA VAG QL NAA+PROBE: NORMAL
G VAGINALIS DNA SPEC QL NAA+PROBE: NORMAL
T VAGINALIS DNA VAG QL NAA+PROBE: NORMAL

## 2024-08-02 PROCEDURE — 11042 DBRDMT SUBQ TIS 1ST 20SQCM/<: CPT

## 2024-08-02 RX ORDER — TRIAMCINOLONE ACETONIDE 1 MG/G
OINTMENT TOPICAL ONCE
Status: CANCELLED | OUTPATIENT
Start: 2024-08-02 | End: 2024-08-02

## 2024-08-02 RX ORDER — IBUPROFEN 200 MG
TABLET ORAL ONCE
Status: CANCELLED | OUTPATIENT
Start: 2024-08-02 | End: 2024-08-02

## 2024-08-02 RX ORDER — BETAMETHASONE DIPROPIONATE 0.5 MG/G
CREAM TOPICAL ONCE
Status: CANCELLED | OUTPATIENT
Start: 2024-08-02 | End: 2024-08-02

## 2024-08-02 RX ORDER — LIDOCAINE HYDROCHLORIDE 20 MG/ML
JELLY TOPICAL ONCE
Status: CANCELLED | OUTPATIENT
Start: 2024-08-02 | End: 2024-08-02

## 2024-08-02 RX ORDER — CLOBETASOL PROPIONATE 0.5 MG/G
OINTMENT TOPICAL ONCE
Status: CANCELLED | OUTPATIENT
Start: 2024-08-02 | End: 2024-08-02

## 2024-08-02 RX ORDER — BACITRACIN ZINC 500 [USP'U]/G
OINTMENT TOPICAL ONCE
Status: CANCELLED | OUTPATIENT
Start: 2024-08-02 | End: 2024-08-02

## 2024-08-02 RX ORDER — LIDOCAINE 50 MG/G
OINTMENT TOPICAL ONCE
Status: CANCELLED | OUTPATIENT
Start: 2024-08-02 | End: 2024-08-02

## 2024-08-02 RX ORDER — LIDOCAINE HYDROCHLORIDE 40 MG/ML
SOLUTION TOPICAL ONCE
Status: CANCELLED | OUTPATIENT
Start: 2024-08-02 | End: 2024-08-02

## 2024-08-02 RX ORDER — GENTAMICIN SULFATE 1 MG/G
OINTMENT TOPICAL ONCE
Status: CANCELLED | OUTPATIENT
Start: 2024-08-02 | End: 2024-08-02

## 2024-08-02 RX ORDER — LIDOCAINE 40 MG/G
CREAM TOPICAL ONCE
Status: CANCELLED | OUTPATIENT
Start: 2024-08-02 | End: 2024-08-02

## 2024-08-02 RX ORDER — SODIUM CHLOR/HYPOCHLOROUS ACID 0.033 %
SOLUTION, IRRIGATION IRRIGATION ONCE
Status: CANCELLED | OUTPATIENT
Start: 2024-08-02 | End: 2024-08-02

## 2024-08-02 RX ORDER — BACITRACIN ZINC AND POLYMYXIN B SULFATE 500; 1000 [USP'U]/G; [USP'U]/G
OINTMENT TOPICAL ONCE
Status: CANCELLED | OUTPATIENT
Start: 2024-08-02 | End: 2024-08-02

## 2024-08-02 ASSESSMENT — PAIN SCALES - GENERAL: PAINLEVEL_OUTOF10: 0

## 2024-08-02 NOTE — PROGRESS NOTES
Inject 30 Units into the skin 3 times daily (before meals) 1 vial 3    mometasone-formoterol (DULERA) 100-5 MCG/ACT inhaler Inhale 2 puffs into the lungs 2 times daily 13 g 3    albuterol (PROVENTIL) (2.5 MG/3ML) 0.083% nebulizer solution   0    VENTOLIN  (90 Base) MCG/ACT inhaler   0    LUMIGAN 0.01 % SOLN ophthalmic drops Place 1 drop into both eyes daily  1    pantoprazole (PROTONIX) 40 MG tablet Take 1 tablet by mouth daily      furosemide (LASIX) 20 MG tablet take 1/2 tablet every few weeks  0    Misc. Devices (CANE) MISC 1 Device by Does not apply route daily as needed. 1 each 0    DULoxetine (CYMBALTA) 60 MG capsule Take 1 capsule by mouth daily      insulin glargine (LANTUS) 100 UNIT/ML injection Inject 50 Units into the skin nightly       No current facility-administered medications on file prior to encounter.       REVIEW OF SYSTEMS    Pertinent items are noted in HPI.    Constitutional: Negative for systemic symptoms including fever, chills and malaise.      Objective:      BP (!) 159/77   Pulse 76   Temp 97.3 °F (36.3 °C) (Temporal)   Resp 16     PHYSICAL EXAM      General: The patient is in no acute distress.    Mental status:  Patient is appropriate, is  oriented to place and plan of care.  Dermatologic exam: Visual inspection of the periwound reveals the skin to be normal in turgor and texture and dry  Wound exam: see wound description below in procedure note      Assessment:     Problem List Items Addressed This Visit          Other    WD-Abscess of chest (HCC) - Primary    Relevant Orders    Initiate Outpatient Wound Care Protocol     Procedure Note    Indications:  Based on my examination of this patient's wound(s) today, sharp excision into necrotic subcutaneous tissue is required to promote healing and evaluate the extent of previous healing.    Performed by: JOSE Maki - CNP    Consent obtained: Yes    Time out taken:  Yes    Pain Control: N/A      Debridement:Excisional

## 2024-08-02 NOTE — PATIENT INSTRUCTIONS
PHYSICIAN ORDERS AND DISCHARGE INSTRUCTIONS    Wound cleansing:     Do not scrub or use excessive force.   Wash hands with soap and water before and after dressing changes.   Prior to applying a clean dressing, cleanse wound with normal saline,               wound cleanser, or mild soap and water.    Ask the physician or nurse before getting the wound(s) wet in a shower      Wound Care Notes:           Orders for this week:  2024       Medial chest :Wash with soap and water. Pat dry.   Apply gentamicin, clobetasol   Cover with gentac silicone border   Change 1 -2 times per day and as needed      Dispense 30 day quantity when ordering supplies.  Plan:       Nurse Visit:   Follow Up Instructions: At the Wound Care Center. Discharged   Primary Wound Care Provider: Bunny   Call  for any questions or concerns.  Central [phone] for imaging and lab work

## 2024-08-05 LAB — HPV HIGH RISK: NOT DETECTED

## 2024-11-14 ENCOUNTER — TELEPHONE (OUTPATIENT)
Dept: OBGYN | Age: 64
End: 2024-11-14

## 2024-11-14 RX ORDER — FLUCONAZOLE 150 MG/1
150 TABLET ORAL ONCE
Qty: 2 TABLET | Refills: 0 | Status: SHIPPED | OUTPATIENT
Start: 2024-11-14 | End: 2024-11-14

## 2024-11-14 NOTE — TELEPHONE ENCOUNTER
Pt states she recently finished an antibiotic from having strep throat and now has a yeast infection. C/o itching and white discharge. Pt would like to know if an rx can be called in. Please advise.

## 2024-12-02 ENCOUNTER — OFFICE VISIT (OUTPATIENT)
Dept: CARDIOLOGY CLINIC | Age: 64
End: 2024-12-02
Payer: MEDICAID

## 2024-12-02 VITALS
DIASTOLIC BLOOD PRESSURE: 82 MMHG | BODY MASS INDEX: 40.44 KG/M2 | OXYGEN SATURATION: 94 % | HEIGHT: 68 IN | HEART RATE: 70 BPM | WEIGHT: 266.8 LBS | SYSTOLIC BLOOD PRESSURE: 130 MMHG

## 2024-12-02 DIAGNOSIS — R94.31 ABNORMAL ELECTROCARDIOGRAPHY: ICD-10-CM

## 2024-12-02 DIAGNOSIS — R07.2 PRECORDIAL PAIN: ICD-10-CM

## 2024-12-02 DIAGNOSIS — I73.9 CLAUDICATION (HCC): ICD-10-CM

## 2024-12-02 DIAGNOSIS — J96.01 ACUTE HYPOXEMIC RESPIRATORY FAILURE: ICD-10-CM

## 2024-12-02 DIAGNOSIS — E78.5 DYSLIPIDEMIA: ICD-10-CM

## 2024-12-02 DIAGNOSIS — R07.9 CHEST PAIN, UNSPECIFIED TYPE: ICD-10-CM

## 2024-12-02 DIAGNOSIS — E11.9 TYPE 2 DIABETES MELLITUS WITHOUT COMPLICATION, WITHOUT LONG-TERM CURRENT USE OF INSULIN (HCC): Primary | ICD-10-CM

## 2024-12-02 DIAGNOSIS — I10 ESSENTIAL HYPERTENSION: ICD-10-CM

## 2024-12-02 DIAGNOSIS — R06.02 SHORTNESS OF BREATH: ICD-10-CM

## 2024-12-02 PROCEDURE — 3075F SYST BP GE 130 - 139MM HG: CPT | Performed by: INTERNAL MEDICINE

## 2024-12-02 PROCEDURE — 3079F DIAST BP 80-89 MM HG: CPT | Performed by: INTERNAL MEDICINE

## 2024-12-02 PROCEDURE — 99214 OFFICE O/P EST MOD 30 MIN: CPT | Performed by: INTERNAL MEDICINE

## 2024-12-02 NOTE — PATIENT INSTRUCTIONS
**It is YOUR responsibilty to bring medication bottles and/or updated medication list to EACH APPOINTMENT. This will allow us to better serve you and all your healthcare needs**  Thank you for allowing us to care for you today!   We want to ensure we can follow your treatment plan and we strive to give you the best outcomes and experience possible.   If you ever have a life threatening emergency and call 911 - for an ambulance (EMS)   Our providers can only care for you at:   [facility] or [facility].   Even if you have someone take you or you drive yourself we can only care for you in a [facility]. Our providers are not setup at the other healthcare locations!   Please be informed that if you contact our office outside of normal business hours the physician on call cannot help with any scheduling or rescheduling issues, procedure instruction questions or any type of medication issue.    We advise you for any urgent/emergency that you go to the nearest emergency room!    PLEASE CALL OUR OFFICE DURING NORMAL BUSINESS HOURS    Monday - Friday   8 am to 5 pm    [place]: [phone]    [place]: [phone]    [place]:  [phone]  We are committed to providing you the best care possible.    If you receive a survey after visiting one of our offices, please take time to share your experience concerning your physician office visit.  These surveys are confidential and no health information about you is shared.    We are eager to improve for you and we are counting on your feedback to help make that happen.

## 2024-12-02 NOTE — PROGRESS NOTES
occlusive arterial disease.    Bilateral VERONICA's show normal arterial flow.         Echo 5/24/21  Summary   Left ventricular systolic function is mildly depressed with an ejection   fraction of 45%-50%.   Mild septal wall asymmetrical left ventricular hypertrophy.   Left Ventricular chamber size is dilated.   No significant valvular disease noted.   No evidence of pericardial effusion.    Stress test  5/14/21    Summary    Supervising physician Dr. Angel .    Normal EF 45 % with normal ventricular contractility. No infarct or ischemia    noted.    Normal stress myocardial perfusion.    This is a normal study.               All labs, medications and tests reviewed by myself including data and history from outside source , patient and available family .  Assessment & Plan:      1. Type 2 diabetes mellitus without complication, without long-term current use of insulin ([facility])    2. Precordial pain    3. Essential hypertension    4. Dyslipidemia    5. Claudication ([facility])    6. Acute hypoxemic respiratory failure    7. Abnormal electrocardiography    8. Chest pain, unspecified type    9. Shortness of breath         .  Precordial pain  Her last stress test in February 2018 and May 2021  showed no ischemia.  She thinks her chest pressure / pain occurs with stress full situation, unfortunately high blood pressure will also cause chest pain, The pain does not  radiate , She notices more shortness of breath when she is not taking her water pill  Will gets lexiscan she cannot walk due ot back pain     Cardiomyopathy  EF of 45- 50 % Hypertensive heart disease? Ef is low normal but she appears euvolemic   Repeat echo and check bnp       Essential hypertension  \"bpis better on  lotrel to 10/40 mg daily She was  advised to bring her meds to clarify what she is taking but she did she did not bring them  . She does not check her bp at home     Claudication ([facility])  Arterial Doppler in March 2018 did not show any abnormality, it may be

## 2024-12-11 ENCOUNTER — HOSPITAL ENCOUNTER (OUTPATIENT)
Age: 64
Discharge: HOME | End: 2024-12-11
Payer: MEDICAID

## 2024-12-11 DIAGNOSIS — R06.02 SHORTNESS OF BREATH: ICD-10-CM

## 2024-12-11 DIAGNOSIS — R94.31 ABNORMAL ELECTROCARDIOGRAPHY: ICD-10-CM

## 2024-12-11 DIAGNOSIS — J96.01 ACUTE HYPOXEMIC RESPIRATORY FAILURE: ICD-10-CM

## 2024-12-11 DIAGNOSIS — E11.9 TYPE 2 DIABETES MELLITUS WITHOUT COMPLICATION, WITHOUT LONG-TERM CURRENT USE OF INSULIN (HCC): ICD-10-CM

## 2024-12-11 DIAGNOSIS — R07.2 PRECORDIAL PAIN: ICD-10-CM

## 2024-12-11 LAB
BNP SERPL-MCNC: <36 PG/ML (ref 0–125)
CHOLEST SERPL-MCNC: 109 MG/DL (ref 125–199)
HDLC SERPL-MCNC: 41 MG/DL
LDLC SERPL CALC-MCNC: 37 MG/DL
TRIGL SERPL-MCNC: 154 MG/DL

## 2024-12-11 PROCEDURE — 80061 LIPID PANEL: CPT

## 2024-12-11 PROCEDURE — 36415 COLL VENOUS BLD VENIPUNCTURE: CPT

## 2024-12-11 PROCEDURE — 83880 ASSAY OF NATRIURETIC PEPTIDE: CPT

## 2024-12-19 ENCOUNTER — TELEPHONE (OUTPATIENT)
Dept: CARDIOLOGY CLINIC | Age: 64
End: 2024-12-19

## 2024-12-19 NOTE — TELEPHONE ENCOUNTER
Notified       Nuclear lexiscan stress test with myocardial perfusion     There is no evidence of inducible ischemia.    Perfusion Defect: There is a mild severity left ventricular stress perfusion defect that is medium in size present in the anterior segment(s). Possible Breast artifact ( stress images are better than rest ) suggestive of artifact    The stress ECG was negative for ischemia.    Stress Test: A pharmacological stress test was performed using regadenoson (Lexiscan). PO caffeine given as a reversal agent. Hemodynamics are adequate for diagnosis. Blood pressure demonstrated a normal response and heart rate demonstrated a normal response to stress. The patient's heart rate recovery was normal.    Clinical correlation needed      Echo (TTE) complete     Compared to previous exam on 1/2024, no significant changes noted.

## 2025-02-10 ENCOUNTER — OFFICE VISIT (OUTPATIENT)
Dept: CARDIOLOGY CLINIC | Age: 65
End: 2025-02-10
Payer: MEDICAID

## 2025-02-10 VITALS
WEIGHT: 274 LBS | BODY MASS INDEX: 41.52 KG/M2 | SYSTOLIC BLOOD PRESSURE: 140 MMHG | HEIGHT: 68 IN | OXYGEN SATURATION: 95 % | DIASTOLIC BLOOD PRESSURE: 98 MMHG | HEART RATE: 80 BPM

## 2025-02-10 DIAGNOSIS — I10 ESSENTIAL HYPERTENSION: Primary | ICD-10-CM

## 2025-02-10 PROCEDURE — 3080F DIAST BP >= 90 MM HG: CPT | Performed by: INTERNAL MEDICINE

## 2025-02-10 PROCEDURE — 93000 ELECTROCARDIOGRAM COMPLETE: CPT | Performed by: INTERNAL MEDICINE

## 2025-02-10 PROCEDURE — 99214 OFFICE O/P EST MOD 30 MIN: CPT | Performed by: INTERNAL MEDICINE

## 2025-02-10 PROCEDURE — 3077F SYST BP >= 140 MM HG: CPT | Performed by: INTERNAL MEDICINE

## 2025-02-10 NOTE — PROGRESS NOTES
Appropriate prescriptions are addressed and refills ordered.  Questions answered and patient verbalizes understanding.  Call for any problems, questions, or concerns.    Continue all other medications of all above medical condition listed as is.    No follow-ups on file.

## 2025-04-14 RX ORDER — ATORVASTATIN CALCIUM 40 MG/1
40 TABLET, FILM COATED ORAL DAILY
Qty: 90 TABLET | Refills: 3 | OUTPATIENT
Start: 2025-04-14

## 2025-04-14 RX ORDER — ATORVASTATIN CALCIUM 40 MG/1
40 TABLET, FILM COATED ORAL DAILY
Qty: 90 TABLET | Refills: 3 | Status: SHIPPED | OUTPATIENT
Start: 2025-04-14

## 2025-05-12 DIAGNOSIS — I10 ESSENTIAL HYPERTENSION: ICD-10-CM

## 2025-05-13 RX ORDER — AMLODIPINE AND BENAZEPRIL HYDROCHLORIDE 10; 40 MG/1; MG/1
1 CAPSULE ORAL DAILY
Qty: 90 CAPSULE | Refills: 3 | Status: SHIPPED | OUTPATIENT
Start: 2025-05-13

## 2025-06-26 ENCOUNTER — HOSPITAL ENCOUNTER (OUTPATIENT)
Dept: GENERAL RADIOLOGY | Age: 65
Discharge: HOME | End: 2025-06-26
Payer: MEDICARE

## 2025-06-26 DIAGNOSIS — M25.551 RIGHT HIP PAIN: ICD-10-CM

## 2025-06-26 PROCEDURE — 73521 X-RAY EXAM HIPS BI 2 VIEWS: CPT

## 2025-06-26 PROCEDURE — 72110 X-RAY EXAM L-2 SPINE 4/>VWS: CPT

## 2025-07-23 ENCOUNTER — OFFICE VISIT (OUTPATIENT)
Dept: ORTHOPEDIC SURGERY | Age: 65
End: 2025-07-23
Payer: MEDICARE

## 2025-07-23 VITALS
HEART RATE: 64 BPM | OXYGEN SATURATION: 96 % | BODY MASS INDEX: 40.92 KG/M2 | WEIGHT: 270 LBS | HEIGHT: 68 IN | RESPIRATION RATE: 14 BRPM

## 2025-07-23 DIAGNOSIS — M46.1 SI JOINT ARTHRITIS: Primary | ICD-10-CM

## 2025-07-23 PROCEDURE — G8417 CALC BMI ABV UP PARAM F/U: HCPCS | Performed by: PHYSICIAN ASSISTANT

## 2025-07-23 PROCEDURE — 99214 OFFICE O/P EST MOD 30 MIN: CPT | Performed by: PHYSICIAN ASSISTANT

## 2025-07-23 PROCEDURE — G8400 PT W/DXA NO RESULTS DOC: HCPCS | Performed by: PHYSICIAN ASSISTANT

## 2025-07-23 PROCEDURE — 3017F COLORECTAL CA SCREEN DOC REV: CPT | Performed by: PHYSICIAN ASSISTANT

## 2025-07-23 PROCEDURE — 1036F TOBACCO NON-USER: CPT | Performed by: PHYSICIAN ASSISTANT

## 2025-07-23 PROCEDURE — G8427 DOCREV CUR MEDS BY ELIG CLIN: HCPCS | Performed by: PHYSICIAN ASSISTANT

## 2025-07-23 PROCEDURE — 1090F PRES/ABSN URINE INCON ASSESS: CPT | Performed by: PHYSICIAN ASSISTANT

## 2025-07-23 PROCEDURE — 1123F ACP DISCUSS/DSCN MKR DOCD: CPT | Performed by: PHYSICIAN ASSISTANT

## 2025-07-23 ASSESSMENT — ENCOUNTER SYMPTOMS
RESPIRATORY NEGATIVE: 1
EYES NEGATIVE: 1
BACK PAIN: 1
GASTROINTESTINAL NEGATIVE: 1

## 2025-07-23 NOTE — PROGRESS NOTES
Review of Systems   Constitutional: Negative.    HENT: Negative.     Eyes: Negative.    Respiratory: Negative.     Cardiovascular: Negative.    Gastrointestinal: Negative.    Genitourinary: Negative.    Musculoskeletal:  Positive for arthralgias, back pain and myalgias.   Skin: Negative.    Neurological: Negative.    Psychiatric/Behavioral: Negative.           HPI:Casie Menendez is a 65 y.o. female who presents to the office today with complaints of pain in her posterior buttock, lower back region.  She states that she has had low back issues for a long time but this is a different type of pain and its lower down and she thought it might be her hips.  She is not complaining about any pain within her groin.  She states that pain is worse with bending over.  Pain is also increased with walking.  She rates her pain at a 6/10.    Past Medical History:   Diagnosis Date    Arthritis     Asthma     Bipolar 1 disorder ([facility])     COPD (chronic obstructive pulmonary disease) ([facility])     Depression     Diabetes mellitus ([facility])     Edema     Glaucoma     History of Doppler ultrasound 03/26/2018    Arterial-bilateral VERONICA's show normal arterial flow. No signif occlusive arterial disease.    History of nuclear stress test 02/22/2018    cardiolite-EF45%,normal    Hx of Doppler echocardiogram 02/22/2018    EF50-60%,no valvular disease    Hyperlipidemia     Hypertension     IBS (irritable bowel syndrome)     Osteoporosis     Type 2 diabetes mellitus without complication ([facility])        Past Surgical History:   Procedure Laterality Date    TUBAL LIGATION         Family History   Problem Relation Age of Onset    Cancer Mother     Diabetes Mother     Heart Disease Mother     Cancer Father     High Blood Pressure Father     Cancer Other     Cancer Other     Diabetes Sister     Heart Disease Sister     High Blood Pressure Brother     Depression Sister        Social History     Socioeconomic History    Marital status: Legally

## 2025-07-23 NOTE — PATIENT INSTRUCTIONS
Dr. Jesse Summers MD  Call us: [phone]    Referral sent for SI joint arthritis    We are committed to providing you the best care possible.  If you receive a survey after visiting one of our offices, please take time to share your experience concerning your physician office visit.  These surveys are confidential and no health information about you is shared.  We are eager to improve for you and we are counting on your feedback to help make that happen.

## 2025-09-05 ENCOUNTER — OFFICE VISIT (OUTPATIENT)
Dept: CARDIOLOGY CLINIC | Age: 65
End: 2025-09-05
Payer: MEDICARE

## 2025-09-05 VITALS
HEIGHT: 68 IN | SYSTOLIC BLOOD PRESSURE: 108 MMHG | BODY MASS INDEX: 41.07 KG/M2 | HEART RATE: 76 BPM | DIASTOLIC BLOOD PRESSURE: 70 MMHG | WEIGHT: 271 LBS

## 2025-09-05 DIAGNOSIS — I42.8 NON-ISCHEMIC CARDIOMYOPATHY (HCC): ICD-10-CM

## 2025-09-05 DIAGNOSIS — E78.5 DYSLIPIDEMIA: Primary | ICD-10-CM

## 2025-09-05 DIAGNOSIS — I10 ESSENTIAL HYPERTENSION: ICD-10-CM

## 2025-09-05 PROCEDURE — 3078F DIAST BP <80 MM HG: CPT | Performed by: NURSE PRACTITIONER

## 2025-09-05 PROCEDURE — 1123F ACP DISCUSS/DSCN MKR DOCD: CPT | Performed by: NURSE PRACTITIONER

## 2025-09-05 PROCEDURE — G8427 DOCREV CUR MEDS BY ELIG CLIN: HCPCS | Performed by: NURSE PRACTITIONER

## 2025-09-05 PROCEDURE — 99214 OFFICE O/P EST MOD 30 MIN: CPT | Performed by: NURSE PRACTITIONER

## 2025-09-05 PROCEDURE — G8417 CALC BMI ABV UP PARAM F/U: HCPCS | Performed by: NURSE PRACTITIONER

## 2025-09-05 PROCEDURE — 3017F COLORECTAL CA SCREEN DOC REV: CPT | Performed by: NURSE PRACTITIONER

## 2025-09-05 PROCEDURE — G8400 PT W/DXA NO RESULTS DOC: HCPCS | Performed by: NURSE PRACTITIONER

## 2025-09-05 PROCEDURE — 1090F PRES/ABSN URINE INCON ASSESS: CPT | Performed by: NURSE PRACTITIONER

## 2025-09-05 PROCEDURE — 3074F SYST BP LT 130 MM HG: CPT | Performed by: NURSE PRACTITIONER

## 2025-09-05 PROCEDURE — 1036F TOBACCO NON-USER: CPT | Performed by: NURSE PRACTITIONER

## 2025-09-05 RX ORDER — INSULIN ASPART 100 [IU]/ML
INJECTION, SOLUTION INTRAVENOUS; SUBCUTANEOUS
COMMUNITY
Start: 2025-08-27

## 2025-09-07 PROBLEM — I73.9 CLAUDICATION: Status: RESOLVED | Noted: 2018-03-14 | Resolved: 2025-09-07

## 2025-09-07 PROBLEM — I42.8 NON-ISCHEMIC CARDIOMYOPATHY (HCC): Status: ACTIVE | Noted: 2025-09-07
